# Patient Record
Sex: FEMALE | Race: WHITE | NOT HISPANIC OR LATINO | Employment: OTHER | URBAN - METROPOLITAN AREA
[De-identification: names, ages, dates, MRNs, and addresses within clinical notes are randomized per-mention and may not be internally consistent; named-entity substitution may affect disease eponyms.]

---

## 2021-01-11 ENCOUNTER — TRANSFERRED RECORDS (OUTPATIENT)
Dept: HEALTH INFORMATION MANAGEMENT | Facility: CLINIC | Age: 61
End: 2021-01-11
Payer: COMMERCIAL

## 2021-01-26 ENCOUNTER — TRANSFERRED RECORDS (OUTPATIENT)
Dept: HEALTH INFORMATION MANAGEMENT | Facility: CLINIC | Age: 61
End: 2021-01-26
Payer: COMMERCIAL

## 2021-06-01 ENCOUNTER — RECORDS - HEALTHEAST (OUTPATIENT)
Dept: ADMINISTRATIVE | Facility: CLINIC | Age: 61
End: 2021-06-01

## 2021-09-30 ENCOUNTER — TRANSFERRED RECORDS (OUTPATIENT)
Dept: HEALTH INFORMATION MANAGEMENT | Facility: CLINIC | Age: 61
End: 2021-09-30
Payer: COMMERCIAL

## 2021-09-30 LAB
ALT SERPL-CCNC: 34 LU/L (ref 0–32)
AST SERPL-CCNC: 68 LU/L (ref 0–40)
INR (EXTERNAL): 1.5 (ref 0.9–1.2)

## 2021-10-31 ENCOUNTER — HOSPITAL ENCOUNTER (EMERGENCY)
Facility: HOSPITAL | Age: 61
Discharge: HOME | End: 2021-11-01
Attending: EMERGENCY MEDICINE | Admitting: EMERGENCY MEDICINE
Payer: COMMERCIAL

## 2021-10-31 ENCOUNTER — APPOINTMENT (OUTPATIENT)
Dept: CT IMAGING | Facility: HOSPITAL | Age: 61
End: 2021-10-31
Attending: EMERGENCY MEDICINE
Payer: COMMERCIAL

## 2021-10-31 ENCOUNTER — APPOINTMENT (OUTPATIENT)
Dept: RADIOLOGY | Facility: HOSPITAL | Age: 61
End: 2021-10-31
Attending: EMERGENCY MEDICINE
Payer: COMMERCIAL

## 2021-10-31 VITALS
RESPIRATION RATE: 16 BRPM | SYSTOLIC BLOOD PRESSURE: 124 MMHG | OXYGEN SATURATION: 99 % | WEIGHT: 221 LBS | HEART RATE: 75 BPM | DIASTOLIC BLOOD PRESSURE: 58 MMHG

## 2021-10-31 DIAGNOSIS — S05.12XA PERIORBITAL CONTUSION OF LEFT EYE, INITIAL ENCOUNTER: ICD-10-CM

## 2021-10-31 DIAGNOSIS — S01.112A LEFT EYELID LACERATION, INITIAL ENCOUNTER: ICD-10-CM

## 2021-10-31 DIAGNOSIS — S09.90XA HEAD INJURY, INITIAL ENCOUNTER: ICD-10-CM

## 2021-10-31 LAB
ABO/RH(D): NORMAL
ALBUMIN SERPL-MCNC: 2.9 G/DL (ref 3.5–5)
ALP SERPL-CCNC: 125 U/L (ref 45–120)
ALT SERPL W P-5'-P-CCNC: 40 U/L (ref 0–45)
AMMONIA PLAS-SCNC: 9 UMOL/L (ref 11–35)
ANION GAP SERPL CALCULATED.3IONS-SCNC: 12 MMOL/L (ref 5–18)
ANTIBODY SCREEN, TUBE: NORMAL
APTT PPP: 42 SECONDS (ref 22–38)
AST SERPL W P-5'-P-CCNC: 63 U/L (ref 0–40)
BASOPHILS # BLD AUTO: 0.1 10E3/UL (ref 0–0.2)
BASOPHILS NFR BLD AUTO: 1 %
BILIRUB SERPL-MCNC: 4.5 MG/DL (ref 0–1)
BUN SERPL-MCNC: 7 MG/DL (ref 8–22)
CALCIUM SERPL-MCNC: 9.1 MG/DL (ref 8.5–10.5)
CHLORIDE BLD-SCNC: 106 MMOL/L (ref 98–107)
CO2 SERPL-SCNC: 21 MMOL/L (ref 22–31)
CREAT SERPL-MCNC: 0.7 MG/DL (ref 0.6–1.1)
EOSINOPHIL # BLD AUTO: 0.2 10E3/UL (ref 0–0.7)
EOSINOPHIL NFR BLD AUTO: 3 %
ERYTHROCYTE [DISTWIDTH] IN BLOOD BY AUTOMATED COUNT: 15 % (ref 10–15)
ETHANOL SERPL-MCNC: 221 MG/DL
GFR SERPL CREATININE-BSD FRML MDRD: >90 ML/MIN/1.73M2
GLUCOSE BLD-MCNC: 112 MG/DL (ref 70–125)
HCT VFR BLD AUTO: 38.4 % (ref 35–47)
HGB BLD-MCNC: 12.9 G/DL (ref 11.7–15.7)
IMM GRANULOCYTES # BLD: 0 10E3/UL
IMM GRANULOCYTES NFR BLD: 0 %
INR PPP: 1.74 (ref 0.85–1.15)
LYMPHOCYTES # BLD AUTO: 2 10E3/UL (ref 0.8–5.3)
LYMPHOCYTES NFR BLD AUTO: 31 %
MCH RBC QN AUTO: 34.6 PG (ref 26.5–33)
MCHC RBC AUTO-ENTMCNC: 33.6 G/DL (ref 31.5–36.5)
MCV RBC AUTO: 103 FL (ref 78–100)
MONOCYTES # BLD AUTO: 0.6 10E3/UL (ref 0–1.3)
MONOCYTES NFR BLD AUTO: 9 %
NEUTROPHILS # BLD AUTO: 3.7 10E3/UL (ref 1.6–8.3)
NEUTROPHILS NFR BLD AUTO: 56 %
NRBC # BLD AUTO: 0 10E3/UL
NRBC BLD AUTO-RTO: 0 /100
PLATELET # BLD AUTO: 140 10E3/UL (ref 150–450)
POTASSIUM BLD-SCNC: 3.5 MMOL/L (ref 3.5–5)
PROT SERPL-MCNC: 9.4 G/DL (ref 6–8)
RBC # BLD AUTO: 3.73 10E6/UL (ref 3.8–5.2)
SODIUM SERPL-SCNC: 139 MMOL/L (ref 136–145)
SPECIMEN EXPIRATION DATE: NORMAL
SPECIMEN EXPIRATION DATE: NORMAL
TROPONIN I SERPL-MCNC: 0.02 NG/ML (ref 0–0.29)
TROPONIN I SERPL-MCNC: 0.02 NG/ML (ref 0–0.29)
WBC # BLD AUTO: 6.5 10E3/UL (ref 4–11)

## 2021-10-31 PROCEDURE — 86850 RBC ANTIBODY SCREEN: CPT | Performed by: EMERGENCY MEDICINE

## 2021-10-31 PROCEDURE — 99285 EMERGENCY DEPT VISIT HI MDM: CPT | Mod: 25

## 2021-10-31 PROCEDURE — 85025 COMPLETE CBC W/AUTO DIFF WBC: CPT | Performed by: EMERGENCY MEDICINE

## 2021-10-31 PROCEDURE — 85610 PROTHROMBIN TIME: CPT | Performed by: EMERGENCY MEDICINE

## 2021-10-31 PROCEDURE — 36415 COLL VENOUS BLD VENIPUNCTURE: CPT | Performed by: EMERGENCY MEDICINE

## 2021-10-31 PROCEDURE — 84484 ASSAY OF TROPONIN QUANT: CPT | Performed by: EMERGENCY MEDICINE

## 2021-10-31 PROCEDURE — 70450 CT HEAD/BRAIN W/O DYE: CPT

## 2021-10-31 PROCEDURE — 82140 ASSAY OF AMMONIA: CPT | Performed by: EMERGENCY MEDICINE

## 2021-10-31 PROCEDURE — 86900 BLOOD TYPING SEROLOGIC ABO: CPT | Performed by: EMERGENCY MEDICINE

## 2021-10-31 PROCEDURE — 93005 ELECTROCARDIOGRAM TRACING: CPT | Performed by: EMERGENCY MEDICINE

## 2021-10-31 PROCEDURE — 70486 CT MAXILLOFACIAL W/O DYE: CPT

## 2021-10-31 PROCEDURE — 72125 CT NECK SPINE W/O DYE: CPT

## 2021-10-31 PROCEDURE — 82040 ASSAY OF SERUM ALBUMIN: CPT | Performed by: EMERGENCY MEDICINE

## 2021-10-31 PROCEDURE — 82077 ASSAY SPEC XCP UR&BREATH IA: CPT | Performed by: EMERGENCY MEDICINE

## 2021-10-31 PROCEDURE — 85730 THROMBOPLASTIN TIME PARTIAL: CPT | Performed by: EMERGENCY MEDICINE

## 2021-10-31 PROCEDURE — 73110 X-RAY EXAM OF WRIST: CPT | Mod: LT

## 2021-10-31 ASSESSMENT — ENCOUNTER SYMPTOMS
NECK PAIN: 0
NAUSEA: 1
ACTIVITY CHANGE: 1
MYALGIAS: 1
WOUND: 1

## 2021-11-01 LAB
ATRIAL RATE - MUSE: 77 BPM
DIASTOLIC BLOOD PRESSURE - MUSE: NORMAL MMHG
INTERPRETATION ECG - MUSE: NORMAL
P AXIS - MUSE: 50 DEGREES
PR INTERVAL - MUSE: 190 MS
QRS DURATION - MUSE: 88 MS
QT - MUSE: 442 MS
QTC - MUSE: 500 MS
R AXIS - MUSE: 20 DEGREES
SYSTOLIC BLOOD PRESSURE - MUSE: NORMAL MMHG
T AXIS - MUSE: 66 DEGREES
VENTRICULAR RATE- MUSE: 77 BPM

## 2021-11-01 PROCEDURE — 250N000013 HC RX MED GY IP 250 OP 250 PS 637: Performed by: EMERGENCY MEDICINE

## 2021-11-01 RX ORDER — ACETAMINOPHEN 325 MG/1
650 TABLET ORAL ONCE
Status: COMPLETED | OUTPATIENT
Start: 2021-11-01 | End: 2021-11-01

## 2021-11-01 RX ADMIN — ACETAMINOPHEN 650 MG: 325 TABLET ORAL at 00:25
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
## 2021-11-01 NOTE — DISCHARGE INSTRUCTIONS
No sleep monitoring needed tonight.  However, with her history of liver dysfunction, she is at risk for delayed bleeding so although we do not see any bleeding on her head tonight, please do make sure that if she starts to show signs of worsened head injury as we discussed and as are listed on the information sheet, please return to the emergency department right away for repeat evaluation.    Replace the Steri-Strips on her laceration as needed.  Continue to do frequent cool packs on the bruise around her left eye to help with swelling and pain.

## 2021-11-01 NOTE — ED NOTES
Bed: JNED-06  Expected date: 10/31/21  Expected time:   Means of arrival: Other  Comments:  Trauma Alert

## 2021-11-10 ENCOUNTER — TRANSFERRED RECORDS (OUTPATIENT)
Dept: HEALTH INFORMATION MANAGEMENT | Facility: CLINIC | Age: 61
End: 2021-11-10
Payer: COMMERCIAL

## 2021-11-10 LAB
ALT SERPL-CCNC: 30 LU/L (ref 0–32)
AST SERPL-CCNC: 53 LU/L (ref 0–40)
HEP C HIM: NORMAL

## 2022-03-15 ENCOUNTER — HOSPITAL ENCOUNTER (INPATIENT)
Facility: HOSPITAL | Age: 62
LOS: 13 days | Discharge: HOME HEALTH | DRG: 432 | End: 2022-03-28
Attending: FAMILY MEDICINE | Admitting: HOSPITALIST
Payer: COMMERCIAL

## 2022-03-15 ENCOUNTER — APPOINTMENT (OUTPATIENT)
Dept: ULTRASOUND IMAGING | Facility: HOSPITAL | Age: 62
DRG: 432 | End: 2022-03-15
Attending: FAMILY MEDICINE
Payer: COMMERCIAL

## 2022-03-15 ENCOUNTER — APPOINTMENT (OUTPATIENT)
Dept: CT IMAGING | Facility: HOSPITAL | Age: 62
DRG: 432 | End: 2022-03-15
Attending: EMERGENCY MEDICINE
Payer: COMMERCIAL

## 2022-03-15 DIAGNOSIS — K70.31 ALCOHOLIC CIRRHOSIS OF LIVER WITH ASCITES (H): ICD-10-CM

## 2022-03-15 DIAGNOSIS — M62.81 GENERALIZED MUSCLE WEAKNESS: ICD-10-CM

## 2022-03-15 DIAGNOSIS — G47.9 SLEEPING DIFFICULTY: Primary | ICD-10-CM

## 2022-03-15 PROBLEM — K64.8 PROLAPSED INTERNAL HEMORRHOIDS: Status: ACTIVE | Noted: 2019-10-29

## 2022-03-15 PROBLEM — Z90.2 HISTORY OF PNEUMONECTOMY: Status: ACTIVE | Noted: 2020-05-12

## 2022-03-15 PROBLEM — Z98.890 HISTORY OF PNEUMONECTOMY: Status: ACTIVE | Noted: 2020-05-12

## 2022-03-15 PROBLEM — R87.619 ABNORMAL CERVICAL PAPANICOLAOU SMEAR: Status: ACTIVE | Noted: 2020-05-12

## 2022-03-15 LAB
% LINING CELLS, BODY FLUID: 1 %
ALBUMIN FLD-MCNC: <0.6 G/DL
ALBUMIN SERPL-MCNC: 2.1 G/DL (ref 3.5–5)
ALBUMIN SERPL-MCNC: 2.3 G/DL (ref 3.5–5)
ALBUMIN UR-MCNC: 30 MG/DL
ALP SERPL-CCNC: 137 U/L (ref 45–120)
ALT SERPL W P-5'-P-CCNC: 31 U/L (ref 0–45)
ANION GAP SERPL CALCULATED.3IONS-SCNC: 8 MMOL/L (ref 5–18)
APPEARANCE FLD: ABNORMAL
APPEARANCE UR: ABNORMAL
AST SERPL W P-5'-P-CCNC: 44 U/L (ref 0–40)
BACTERIA #/AREA URNS HPF: ABNORMAL /HPF
BASOPHILS # BLD AUTO: 0.1 10E3/UL (ref 0–0.2)
BASOPHILS NFR BLD AUTO: 1 %
BILIRUB SERPL-MCNC: 6 MG/DL (ref 0–1)
BILIRUB UR QL STRIP: ABNORMAL
BUN SERPL-MCNC: 10 MG/DL (ref 8–22)
CALCIUM SERPL-MCNC: 7.7 MG/DL (ref 8.5–10.5)
CHLORIDE BLD-SCNC: 107 MMOL/L (ref 98–107)
CO2 SERPL-SCNC: 22 MMOL/L (ref 22–31)
COLOR FLD: YELLOW
COLOR UR AUTO: ABNORMAL
CREAT SERPL-MCNC: 0.68 MG/DL (ref 0.6–1.1)
EOSINOPHIL # BLD AUTO: 0.1 10E3/UL (ref 0–0.7)
EOSINOPHIL NFR BLD AUTO: 1 %
ERYTHROCYTE [DISTWIDTH] IN BLOOD BY AUTOMATED COUNT: 16.5 % (ref 10–15)
ETHANOL SERPL-MCNC: <10 MG/DL
GFR SERPL CREATININE-BSD FRML MDRD: >90 ML/MIN/1.73M2
GGT SERPL-CCNC: 23 U/L (ref 0–50)
GLUCOSE BLD-MCNC: 99 MG/DL (ref 70–125)
GLUCOSE UR STRIP-MCNC: 30 MG/DL
HCT VFR BLD AUTO: 30.6 % (ref 35–47)
HGB BLD-MCNC: 10.1 G/DL (ref 11.7–15.7)
HGB UR QL STRIP: ABNORMAL
IMM GRANULOCYTES # BLD: 0.1 10E3/UL
IMM GRANULOCYTES NFR BLD: 1 %
INR PPP: 2.47 (ref 0.85–1.15)
KETONES UR STRIP-MCNC: ABNORMAL MG/DL
LDH SERPL L TO P-CCNC: 304 U/L (ref 125–220)
LEUKOCYTE ESTERASE UR QL STRIP: NEGATIVE
LYMPHOCYTES # BLD AUTO: 1.4 10E3/UL (ref 0.8–5.3)
LYMPHOCYTES NFR BLD AUTO: 20 %
LYMPHOCYTES NFR FLD MANUAL: 8 %
MCH RBC QN AUTO: 33.4 PG (ref 26.5–33)
MCHC RBC AUTO-ENTMCNC: 33 G/DL (ref 31.5–36.5)
MCV RBC AUTO: 101 FL (ref 78–100)
MONOCYTES # BLD AUTO: 1.3 10E3/UL (ref 0–1.3)
MONOCYTES NFR BLD AUTO: 19 %
MONOS+MACROS NFR FLD MANUAL: 60 %
MUCOUS THREADS #/AREA URNS LPF: PRESENT /LPF
NEUTROPHILS # BLD AUTO: 3.9 10E3/UL (ref 1.6–8.3)
NEUTROPHILS NFR BLD AUTO: 58 %
NEUTS BAND NFR FLD MANUAL: 31 %
NITRATE UR QL: NEGATIVE
NRBC # BLD AUTO: 0 10E3/UL
NRBC BLD AUTO-RTO: 0 /100
PH UR STRIP: 6 [PH] (ref 5–7)
PLATELET # BLD AUTO: 122 10E3/UL (ref 150–450)
POTASSIUM BLD-SCNC: 3.3 MMOL/L (ref 3.5–5)
PROT FLD-MCNC: 1 G/DL
PROT SERPL-MCNC: 7.8 G/DL (ref 6–8)
RBC # BLD AUTO: 3.02 10E6/UL (ref 3.8–5.2)
RBC # FLD: 1000 /UL
RBC URINE: 3 /HPF
SARS-COV-2 RNA RESP QL NAA+PROBE: NEGATIVE
SODIUM SERPL-SCNC: 137 MMOL/L (ref 136–145)
SP GR UR STRIP: 1.03 (ref 1–1.03)
SQUAMOUS EPITHELIAL: 17 /HPF
UROBILINOGEN UR STRIP-MCNC: 8 MG/DL
WBC # BLD AUTO: 6.7 10E3/UL (ref 4–11)
WBC # FLD AUTO: 168 /UL
WBC URINE: 3 /HPF

## 2022-03-15 PROCEDURE — 87070 CULTURE OTHR SPECIMN AEROBIC: CPT | Performed by: FAMILY MEDICINE

## 2022-03-15 PROCEDURE — 82040 ASSAY OF SERUM ALBUMIN: CPT | Performed by: EMERGENCY MEDICINE

## 2022-03-15 PROCEDURE — 74177 CT ABD & PELVIS W/CONTRAST: CPT

## 2022-03-15 PROCEDURE — 83615 LACTATE (LD) (LDH) ENZYME: CPT | Performed by: EMERGENCY MEDICINE

## 2022-03-15 PROCEDURE — 80053 COMPREHEN METABOLIC PANEL: CPT | Performed by: EMERGENCY MEDICINE

## 2022-03-15 PROCEDURE — 99223 1ST HOSP IP/OBS HIGH 75: CPT | Performed by: HOSPITALIST

## 2022-03-15 PROCEDURE — 36415 COLL VENOUS BLD VENIPUNCTURE: CPT | Performed by: EMERGENCY MEDICINE

## 2022-03-15 PROCEDURE — 84157 ASSAY OF PROTEIN OTHER: CPT | Performed by: FAMILY MEDICINE

## 2022-03-15 PROCEDURE — 88305 TISSUE EXAM BY PATHOLOGIST: CPT | Mod: TC | Performed by: HOSPITALIST

## 2022-03-15 PROCEDURE — 82042 OTHER SOURCE ALBUMIN QUAN EA: CPT | Performed by: FAMILY MEDICINE

## 2022-03-15 PROCEDURE — 0W9G3ZZ DRAINAGE OF PERITONEAL CAVITY, PERCUTANEOUS APPROACH: ICD-10-PCS | Performed by: RADIOLOGY

## 2022-03-15 PROCEDURE — 82077 ASSAY SPEC XCP UR&BREATH IA: CPT | Performed by: EMERGENCY MEDICINE

## 2022-03-15 PROCEDURE — 49083 ABD PARACENTESIS W/IMAGING: CPT

## 2022-03-15 PROCEDURE — 82977 ASSAY OF GGT: CPT | Performed by: EMERGENCY MEDICINE

## 2022-03-15 PROCEDURE — 87015 SPECIMEN INFECT AGNT CONCNTJ: CPT | Performed by: FAMILY MEDICINE

## 2022-03-15 PROCEDURE — 210N000001 HC R&B IMCU HEART CARE

## 2022-03-15 PROCEDURE — C9803 HOPD COVID-19 SPEC COLLECT: HCPCS

## 2022-03-15 PROCEDURE — 99285 EMERGENCY DEPT VISIT HI MDM: CPT | Mod: 25

## 2022-03-15 PROCEDURE — 250N000011 HC RX IP 250 OP 636: Performed by: FAMILY MEDICINE

## 2022-03-15 PROCEDURE — 87635 SARS-COV-2 COVID-19 AMP PRB: CPT | Performed by: FAMILY MEDICINE

## 2022-03-15 PROCEDURE — 85610 PROTHROMBIN TIME: CPT | Performed by: EMERGENCY MEDICINE

## 2022-03-15 PROCEDURE — 81003 URINALYSIS AUTO W/O SCOPE: CPT | Performed by: EMERGENCY MEDICINE

## 2022-03-15 PROCEDURE — 85025 COMPLETE CBC W/AUTO DIFF WBC: CPT | Performed by: EMERGENCY MEDICINE

## 2022-03-15 PROCEDURE — 272N000706 US PARACENTESIS

## 2022-03-15 PROCEDURE — 89051 BODY FLUID CELL COUNT: CPT | Performed by: FAMILY MEDICINE

## 2022-03-15 PROCEDURE — 87205 SMEAR GRAM STAIN: CPT | Performed by: FAMILY MEDICINE

## 2022-03-15 PROCEDURE — 82105 ALPHA-FETOPROTEIN SERUM: CPT | Performed by: HOSPITALIST

## 2022-03-15 PROCEDURE — 83735 ASSAY OF MAGNESIUM: CPT | Performed by: HOSPITALIST

## 2022-03-15 RX ORDER — IOPAMIDOL 755 MG/ML
100 INJECTION, SOLUTION INTRAVASCULAR ONCE
Status: COMPLETED | OUTPATIENT
Start: 2022-03-15 | End: 2022-03-15

## 2022-03-15 RX ADMIN — IOPAMIDOL 100 ML: 755 INJECTION, SOLUTION INTRAVENOUS at 18:28

## 2022-03-15 ASSESSMENT — ACTIVITIES OF DAILY LIVING (ADL)
ADLS_ACUITY_SCORE: 12

## 2022-03-15 ASSESSMENT — ENCOUNTER SYMPTOMS
FEVER: 0
FATIGUE: 1
DIFFICULTY URINATING: 0
ABDOMINAL PAIN: 1
UNEXPECTED WEIGHT CHANGE: 1
CHILLS: 1
DYSURIA: 0
SHORTNESS OF BREATH: 1

## 2022-03-15 NOTE — ED PROVIDER NOTES
ED triage provider note    Seen in clinic yesterday for symptoms of swelling and jaundice.  Labs revealed evidence of liver failure.  She has a history of hep C.  Denies recent alcohol intake.    Vital signs are normal.  Appears jaundiced    Diagnostics ordered     Rasheed Bailon MD  03/15/22 1330       Rasheed Bailon MD  03/15/22 1330

## 2022-03-15 NOTE — ED TRIAGE NOTES
"Pt with c/o leg and abdominal pain along with bilateral swelling  And weight gain.  Pt saw her primary doctor yesterday and was told they would call her with results if they were bad o/w she could look in \"my chart\"  Pt states provider sent the  out to her place to do a wellness check on her.  Pt is jaundiced.  "

## 2022-03-15 NOTE — PHARMACY-ADMISSION MEDICATION HISTORY
Pharmacy Note - Admission Medication History    Pertinent Provider Information:      ______________________________________________________________________    Prior To Admission (PTA) med list completed and updated in EMR.       PTA Med List   Medication Sig Last Dose     sertraline (ZOLOFT) 50 MG tablet Take 50 mg by mouth daily 3/15/2022 at Unknown time       Information source(s): Patient and CareEverywhere/SureScripts  Method of interview communication: in-person    Summary of Changes to PTA Med List  New: sertraline  Discontinued: n/a  Changed: n/a    Patient was asked about OTC/herbal products specifically.  PTA med list reflects this.      Allergies were reviewed, assessed, and updated with the patient.      Patient does not use any multi-dose medications prior to admission.    The information provided in this note is only as accurate as the sources available at the time of the update(s).    Thank you for the opportunity to participate in the care of this patient.    Gia Jones [facility]  3/15/2022 5:28 PM

## 2022-03-15 NOTE — ED PROVIDER NOTES
EMERGENCY DEPARTMENT ENCOUNTER      NAME: Marj Slater  AGE: 61 year old female  YOB: 1960  MRN: [id number]  EVALUATION DATE & TIME: 3/15/2022  4:10 PM    PCP: [facility]    ED PROVIDER: Rasheed Mcelroy M.D.    Chief Complaint   Patient presents with     liver failure       FINAL IMPRESSION:  1. Alcoholic cirrhosis of liver with ascites (H)        ED COURSE & MEDICAL DECISION MAKING:    Pertinent Labs & Imaging studies personally reviewed and interpreted by me. (See chart for details)     4:34 PM Patient seen and examined, prior records reviewed.  I met the patient and performed my initial interview and exam.  Patient with known history of liver failure, comes in with worsening jaundice, abdominal distention, lower extremity swelling.  Denies alcohol use and Tylenol use.  History of hepatitis C but this has been treated twice by her report.  On exam, appears jaundiced with pitting edema to the thighs and abdominal distention, minimal tenderness.  Labs and CT scan are ordered.  Labs from triage so far demonstrate elevated INR at 2.47.  Patient is not anticoagulated.  7:16 PM CT scan demonstrates ascites and some small esophageal varices, patient has no hematemesis and hemoglobin is stable.  Care was discussed with GI, Dr. Estes who recommends diagnostic paracentesis, patient will be admitted.  7:25 PM I rechecked and updated the patient.    8:32 PM  Waiting for callback from hospitalist.  8:38 PM I spoke with Dr. Milner, hospitalist.     At the conclusion of the encounter I discussed the results of all of the tests and the disposition. The questions were answered. The patient or family acknowledged understanding and was agreeable with the care plan.     PROCEDURES:   Procedures    MEDICATIONS GIVEN IN THE EMERGENCY:  Medications   iopamidol (ISOVUE-370) solution 100 mL (100 mLs Intravenous Given 3/15/22 1828)       NEW PRESCRIPTIONS STARTED AT TODAY'S ER VISIT  New  Prescriptions    No medications on file       =================================================================    HPI    Patient information was obtained from: Patient      Marj Slater is a 61 year old female with a pertinent history of hepatitis C who presents to this ED by car for evaluation of liver failure.     Patient reports that she went to her doctor and was told to come here for her liver. She has had leg and feet swelling for a couple weeks, and has abdominal pain and fatigue for a week as well. Notes having shortness of breath with exertion. Has gained 20 pounds recently. History of hepatitis C that has been treated twice. Ten years ago was treated with pegasys and last year treated with Mayvret. No history of swelling like this. She still is able to pee. No dysuria. Patient takes sertraline. No alcohol since October but used to drink regularly. Reports having chills a week ago but no fever. No recent travel. Has an endoscopy on the 30th with [facility]. Denies any chest pain, or any other complaints at this time.       REVIEW OF SYSTEMS   Review of Systems   Constitutional: Positive for chills, fatigue and unexpected weight change. Negative for fever.   Respiratory: Positive for shortness of breath.    Cardiovascular: Positive for leg swelling. Negative for chest pain.   Gastrointestinal: Positive for abdominal pain.   Genitourinary: Negative for difficulty urinating and dysuria.      All other systems reviewed and negative    PAST MEDICAL HISTORY:  Past Medical History:   Diagnosis Date     Chronic hepatitis C (H)      History of pneumonectomy        PAST SURGICAL HISTORY:  History reviewed. No pertinent surgical history.    CURRENT MEDICATIONS:    No current facility-administered medications for this encounter.     Current Outpatient Medications   Medication     sertraline (ZOLOFT) 50 MG tablet       ALLERGIES:  No Known Allergies    FAMILY HISTORY:  History reviewed. No pertinent family  "history.    SOCIAL HISTORY:   Social History     Socioeconomic History     Marital status: Single     Spouse name: Not on file     Number of children: Not on file     Years of education: Not on file     Highest education level: Not on file   Occupational History     Not on file   Tobacco Use     Smoking status: Not on file     Smokeless tobacco: Not on file   Substance and Sexual Activity     Alcohol use: Not on file     Drug use: Not on file     Sexual activity: Not on file   Other Topics Concern     Not on file   Social History Narrative     Not on file     Social Determinants of Health     Financial Resource Strain: Not on file   Food Insecurity: Not on file   Transportation Needs: Not on file   Physical Activity: Not on file   Stress: Not on file   Social Connections: Not on file   Intimate Partner Violence: Not on file   Housing Stability: Not on file       VITALS:  BP (!) 162/73   Pulse 76   Temp 98  F (36.7  C)   Resp 16   Ht 1.651 m (5' 5\")   Wt 109.3 kg (241 lb)   SpO2 97%   BMI 40.10 kg/m      PHYSICAL EXAM:  Physical Exam  Vitals and nursing note reviewed.   Constitutional:       Appearance: Normal appearance.   HENT:      Head: Normocephalic and atraumatic.      Right Ear: External ear normal.      Left Ear: External ear normal.      Nose: Nose normal.      Mouth/Throat:      Mouth: Mucous membranes are moist.   Eyes:      Extraocular Movements: Extraocular movements intact.      Conjunctiva/sclera: Conjunctivae normal.      Pupils: Pupils are equal, round, and reactive to light.   Cardiovascular:      Rate and Rhythm: Normal rate and regular rhythm.      Comments: Moderate pitting edema in thighs.   Pulmonary:      Effort: Pulmonary effort is normal.      Breath sounds: Normal breath sounds. No wheezing or rales.   Abdominal:      General: Abdomen is flat. There is distension.      Palpations: Abdomen is soft.      Tenderness: There is abdominal tenderness (mild diffuse). There is no guarding. "   Musculoskeletal:         General: Normal range of motion.      Cervical back: Normal range of motion and neck supple.      Right lower leg: No edema.      Left lower leg: No edema.   Lymphadenopathy:      Cervical: No cervical adenopathy.   Skin:     General: Skin is warm and dry.      Coloration: Skin is jaundiced.   Neurological:      General: No focal deficit present.      Mental Status: She is alert and oriented to person, place, and time. Mental status is at baseline.      Comments: No gross focal neurologic deficits   Psychiatric:         Mood and Affect: Mood normal.         Behavior: Behavior normal.         Thought Content: Thought content normal.          LAB:  All pertinent labs reviewed and interpreted.  Results for orders placed or performed during the hospital encounter of 03/15/22   CT Abdomen Pelvis w Contrast    Impression    IMPRESSION:   1.  Hepatic cirrhosis and steatosis portal hypertension including large volume ascites, mild splenic enlargement and small caliber upper abdominal varices.  2.  Cholelithiasis.   Result Value Ref Range    INR 2.47 (H) 0.85 - 1.15   Comprehensive metabolic panel   Result Value Ref Range    Sodium 137 136 - 145 mmol/L    Potassium 3.3 (L) 3.5 - 5.0 mmol/L    Chloride 107 98 - 107 mmol/L    Carbon Dioxide (CO2) 22 22 - 31 mmol/L    Anion Gap 8 5 - 18 mmol/L    Urea Nitrogen 10 8 - 22 mg/dL    Creatinine 0.68 0.60 - 1.10 mg/dL    Calcium 7.7 (L) 8.5 - 10.5 mg/dL    Glucose 99 70 - 125 mg/dL    Alkaline Phosphatase 137 (H) 45 - 120 U/L    AST 44 (H) 0 - 40 U/L    ALT 31 0 - 45 U/L    Protein Total 7.8 6.0 - 8.0 g/dL    Albumin 2.3 (L) 3.5 - 5.0 g/dL    Bilirubin Total 6.0 (H) 0.0 - 1.0 mg/dL    GFR Estimate >90 >60 mL/min/1.73m2   UA with Microscopic reflex to Culture    Specimen: Urine, Midstream   Result Value Ref Range    Color Urine Stefania (A) Colorless, Straw, Light Yellow, Yellow    Appearance Urine Turbid (A) Clear    Glucose Urine 30  (A) Negative mg/dL     Bilirubin Urine 1.0 mg/dL (A) Negative    Ketones Urine Trace (A) Negative mg/dL    Specific Gravity Urine 1.030 1.001 - 1.030    Blood Urine 0.2 mg/dL (A) Negative    pH Urine 6.0 5.0 - 7.0    Protein Albumin Urine 30  (A) Negative mg/dL    Urobilinogen Urine 8.0 (A) <2.0 mg/dL    Nitrite Urine Negative Negative    Leukocyte Esterase Urine Negative Negative    Bacteria Urine Few (A) None Seen /HPF    Mucus Urine Present (A) None Seen /LPF    RBC Urine 3 (H) <=2 /HPF    WBC Urine 3 <=5 /HPF    Squamous Epithelials Urine 17 (H) <=1 /HPF   Ethyl Alcohol Level   Result Value Ref Range    Alcohol, Blood <10 None detected mg/dL   Result Value Ref Range    Lactate Dehydrogenase 304 (H) 125 - 220 U/L   CBC with platelets and differential   Result Value Ref Range    WBC Count 6.7 4.0 - 11.0 10e3/uL    RBC Count 3.02 (L) 3.80 - 5.20 10e6/uL    Hemoglobin 10.1 (L) 11.7 - 15.7 g/dL    Hematocrit 30.6 (L) 35.0 - 47.0 %     (H) 78 - 100 fL    MCH 33.4 (H) 26.5 - 33.0 pg    MCHC 33.0 31.5 - 36.5 g/dL    RDW 16.5 (H) 10.0 - 15.0 %    Platelet Count 122 (L) 150 - 450 10e3/uL    % Neutrophils 58 %    % Lymphocytes 20 %    % Monocytes 19 %    % Eosinophils 1 %    % Basophils 1 %    % Immature Granulocytes 1 %    NRBCs per 100 WBC 0 <1 /100    Absolute Neutrophils 3.9 1.6 - 8.3 10e3/uL    Absolute Lymphocytes 1.4 0.8 - 5.3 10e3/uL    Absolute Monocytes 1.3 0.0 - 1.3 10e3/uL    Absolute Eosinophils 0.1 0.0 - 0.7 10e3/uL    Absolute Basophils 0.1 0.0 - 0.2 10e3/uL    Absolute Immature Granulocytes 0.1 <=0.4 10e3/uL    Absolute NRBCs 0.0 10e3/uL   Albumin level   Result Value Ref Range    Albumin 2.1 (L) 3.5 - 5.0 g/dL       RADIOLOGY:  Reviewed all pertinent imaging. Please see official radiology report.  CT Abdomen Pelvis w Contrast   Final Result   IMPRESSION:    1.  Hepatic cirrhosis and steatosis portal hypertension including large volume ascites, mild splenic enlargement and small caliber upper abdominal varices.   2.   Cholelithiasis.      US Paracentesis    (Results Pending)       I, Sabino Owens, am serving as a scribe to document services personally performed by Dr. Mcelroy based on my observation and the provider's statements to me. I, Rasheed Mcelroy MD attest that Sabino Owens is acting in a scribe capacity, has observed my performance of the services and has documented them in accordance with my direction.    Rasheed Mcelroy M.D.  Emergency Medicine  [facility] EMERGENCY DEPARTMENT  [address] [place] [id number]  [phone]  Dept: [phone]     Rasheed Mcelroy MD  03/31/22 0804

## 2022-03-16 PROBLEM — R60.1 ANASARCA: Status: ACTIVE | Noted: 2022-03-16

## 2022-03-16 LAB
AFP SERPL-MCNC: 4.5 NG/ML
ALBUMIN SERPL-MCNC: 1.9 G/DL (ref 3.5–5)
ALP SERPL-CCNC: 123 U/L (ref 45–120)
ALT SERPL W P-5'-P-CCNC: 21 U/L (ref 0–45)
AMMONIA PLAS-SCNC: 44 UMOL/L (ref 11–35)
ANION GAP SERPL CALCULATED.3IONS-SCNC: 6 MMOL/L (ref 5–18)
AST SERPL W P-5'-P-CCNC: 38 U/L (ref 0–40)
BASOPHILS # BLD AUTO: 0 10E3/UL (ref 0–0.2)
BASOPHILS NFR BLD AUTO: 1 %
BILIRUB SERPL-MCNC: 5 MG/DL (ref 0–1)
BUN SERPL-MCNC: 9 MG/DL (ref 8–22)
CALCIUM SERPL-MCNC: 7.5 MG/DL (ref 8.5–10.5)
CHLORIDE BLD-SCNC: 106 MMOL/L (ref 98–107)
CO2 SERPL-SCNC: 24 MMOL/L (ref 22–31)
CREAT SERPL-MCNC: 0.66 MG/DL (ref 0.6–1.1)
EOSINOPHIL # BLD AUTO: 0.1 10E3/UL (ref 0–0.7)
EOSINOPHIL NFR BLD AUTO: 2 %
ERYTHROCYTE [DISTWIDTH] IN BLOOD BY AUTOMATED COUNT: 16.5 % (ref 10–15)
GFR SERPL CREATININE-BSD FRML MDRD: >90 ML/MIN/1.73M2
GLUCOSE BLD-MCNC: 113 MG/DL (ref 70–125)
GRAM STAIN RESULT: NORMAL
GRAM STAIN RESULT: NORMAL
HCT VFR BLD AUTO: 26 % (ref 35–47)
HGB BLD-MCNC: 8.8 G/DL (ref 11.7–15.7)
IMM GRANULOCYTES # BLD: 0 10E3/UL
IMM GRANULOCYTES NFR BLD: 1 %
INR PPP: 2.66 (ref 0.85–1.15)
LYMPHOCYTES # BLD AUTO: 1.5 10E3/UL (ref 0.8–5.3)
LYMPHOCYTES NFR BLD AUTO: 23 %
MAGNESIUM SERPL-MCNC: 1.4 MG/DL (ref 1.8–2.6)
MAGNESIUM SERPL-MCNC: 1.7 MG/DL (ref 1.8–2.6)
MCH RBC QN AUTO: 34.1 PG (ref 26.5–33)
MCHC RBC AUTO-ENTMCNC: 33.8 G/DL (ref 31.5–36.5)
MCV RBC AUTO: 101 FL (ref 78–100)
MONOCYTES # BLD AUTO: 1.1 10E3/UL (ref 0–1.3)
MONOCYTES NFR BLD AUTO: 17 %
NEUTROPHILS # BLD AUTO: 3.6 10E3/UL (ref 1.6–8.3)
NEUTROPHILS NFR BLD AUTO: 56 %
NRBC # BLD AUTO: 0 10E3/UL
NRBC BLD AUTO-RTO: 0 /100
PLATELET # BLD AUTO: 92 10E3/UL (ref 150–450)
POTASSIUM BLD-SCNC: 3.3 MMOL/L (ref 3.5–5)
POTASSIUM BLD-SCNC: 3.3 MMOL/L (ref 3.5–5)
POTASSIUM BLD-SCNC: 3.8 MMOL/L (ref 3.5–5)
PROT SERPL-MCNC: 6.6 G/DL (ref 6–8)
RBC # BLD AUTO: 2.58 10E6/UL (ref 3.8–5.2)
SODIUM SERPL-SCNC: 136 MMOL/L (ref 136–145)
WBC # BLD AUTO: 6.3 10E3/UL (ref 4–11)

## 2022-03-16 PROCEDURE — 82140 ASSAY OF AMMONIA: CPT | Performed by: HOSPITALIST

## 2022-03-16 PROCEDURE — 36415 COLL VENOUS BLD VENIPUNCTURE: CPT | Performed by: INTERNAL MEDICINE

## 2022-03-16 PROCEDURE — 84132 ASSAY OF SERUM POTASSIUM: CPT | Performed by: INTERNAL MEDICINE

## 2022-03-16 PROCEDURE — 250N000011 HC RX IP 250 OP 636: Performed by: HOSPITALIST

## 2022-03-16 PROCEDURE — 120N000004 HC R&B MS OVERFLOW

## 2022-03-16 PROCEDURE — 250N000013 HC RX MED GY IP 250 OP 250 PS 637: Performed by: HOSPITALIST

## 2022-03-16 PROCEDURE — 99233 SBSQ HOSP IP/OBS HIGH 50: CPT | Performed by: INTERNAL MEDICINE

## 2022-03-16 PROCEDURE — 85610 PROTHROMBIN TIME: CPT | Performed by: HOSPITALIST

## 2022-03-16 PROCEDURE — 85014 HEMATOCRIT: CPT | Performed by: HOSPITALIST

## 2022-03-16 PROCEDURE — 80053 COMPREHEN METABOLIC PANEL: CPT | Performed by: HOSPITALIST

## 2022-03-16 PROCEDURE — 36415 COLL VENOUS BLD VENIPUNCTURE: CPT | Performed by: HOSPITALIST

## 2022-03-16 PROCEDURE — 250N000011 HC RX IP 250 OP 636: Performed by: INTERNAL MEDICINE

## 2022-03-16 PROCEDURE — 250N000013 HC RX MED GY IP 250 OP 250 PS 637: Performed by: INTERNAL MEDICINE

## 2022-03-16 PROCEDURE — 83735 ASSAY OF MAGNESIUM: CPT | Performed by: INTERNAL MEDICINE

## 2022-03-16 RX ORDER — POTASSIUM CHLORIDE 1500 MG/1
40 TABLET, EXTENDED RELEASE ORAL ONCE
Status: COMPLETED | OUTPATIENT
Start: 2022-03-16 | End: 2022-03-16

## 2022-03-16 RX ORDER — NALOXONE HYDROCHLORIDE 0.4 MG/ML
0.4 INJECTION, SOLUTION INTRAMUSCULAR; INTRAVENOUS; SUBCUTANEOUS
Status: DISCONTINUED | OUTPATIENT
Start: 2022-03-16 | End: 2022-03-28 | Stop reason: HOSPADM

## 2022-03-16 RX ORDER — SPIRONOLACTONE 25 MG/1
100 TABLET ORAL DAILY
Status: DISCONTINUED | OUTPATIENT
Start: 2022-03-16 | End: 2022-03-28 | Stop reason: HOSPADM

## 2022-03-16 RX ORDER — NALOXONE HYDROCHLORIDE 0.4 MG/ML
0.2 INJECTION, SOLUTION INTRAMUSCULAR; INTRAVENOUS; SUBCUTANEOUS
Status: DISCONTINUED | OUTPATIENT
Start: 2022-03-16 | End: 2022-03-28 | Stop reason: HOSPADM

## 2022-03-16 RX ORDER — LANOLIN ALCOHOL/MO/W.PET/CERES
3 CREAM (GRAM) TOPICAL
Status: DISCONTINUED | OUTPATIENT
Start: 2022-03-16 | End: 2022-03-25

## 2022-03-16 RX ORDER — PROCHLORPERAZINE 25 MG
25 SUPPOSITORY, RECTAL RECTAL EVERY 12 HOURS PRN
Status: DISCONTINUED | OUTPATIENT
Start: 2022-03-16 | End: 2022-03-28 | Stop reason: HOSPADM

## 2022-03-16 RX ORDER — POTASSIUM CHLORIDE 1500 MG/1
20 TABLET, EXTENDED RELEASE ORAL ONCE
Status: COMPLETED | OUTPATIENT
Start: 2022-03-16 | End: 2022-03-16

## 2022-03-16 RX ORDER — FUROSEMIDE 10 MG/ML
40 INJECTION INTRAMUSCULAR; INTRAVENOUS EVERY 12 HOURS
Status: DISCONTINUED | OUTPATIENT
Start: 2022-03-16 | End: 2022-03-17

## 2022-03-16 RX ORDER — CEFTRIAXONE 2 G/1
2 INJECTION, POWDER, FOR SOLUTION INTRAMUSCULAR; INTRAVENOUS EVERY 24 HOURS
Status: DISCONTINUED | OUTPATIENT
Start: 2022-03-16 | End: 2022-03-16

## 2022-03-16 RX ORDER — LIDOCAINE 40 MG/G
CREAM TOPICAL
Status: DISCONTINUED | OUTPATIENT
Start: 2022-03-16 | End: 2022-03-28 | Stop reason: HOSPADM

## 2022-03-16 RX ORDER — OXYCODONE HYDROCHLORIDE 5 MG/1
5 TABLET ORAL EVERY 4 HOURS PRN
Status: DISCONTINUED | OUTPATIENT
Start: 2022-03-16 | End: 2022-03-22

## 2022-03-16 RX ORDER — PROCHLORPERAZINE MALEATE 10 MG
10 TABLET ORAL EVERY 6 HOURS PRN
Status: DISCONTINUED | OUTPATIENT
Start: 2022-03-16 | End: 2022-03-28 | Stop reason: HOSPADM

## 2022-03-16 RX ORDER — MAGNESIUM SULFATE 4 G/50ML
4 INJECTION INTRAVENOUS ONCE
Status: COMPLETED | OUTPATIENT
Start: 2022-03-16 | End: 2022-03-16

## 2022-03-16 RX ADMIN — MAGNESIUM SULFATE HEPTAHYDRATE 4 G: 80 INJECTION, SOLUTION INTRAVENOUS at 10:05

## 2022-03-16 RX ADMIN — OXYCODONE HYDROCHLORIDE 5 MG: 5 TABLET ORAL at 19:36

## 2022-03-16 RX ADMIN — FUROSEMIDE 40 MG: 10 INJECTION, SOLUTION INTRAMUSCULAR; INTRAVENOUS at 01:12

## 2022-03-16 RX ADMIN — POTASSIUM CHLORIDE 20 MEQ: 1500 TABLET, EXTENDED RELEASE ORAL at 16:56

## 2022-03-16 RX ADMIN — FUROSEMIDE 40 MG: 10 INJECTION, SOLUTION INTRAMUSCULAR; INTRAVENOUS at 13:24

## 2022-03-16 RX ADMIN — SPIRONOLACTONE 100 MG: 25 TABLET, FILM COATED ORAL at 09:47

## 2022-03-16 RX ADMIN — OXYCODONE HYDROCHLORIDE 5 MG: 5 TABLET ORAL at 09:48

## 2022-03-16 RX ADMIN — CEFTRIAXONE SODIUM 2 G: 2 INJECTION, POWDER, FOR SOLUTION INTRAMUSCULAR; INTRAVENOUS at 01:13

## 2022-03-16 RX ADMIN — OXYCODONE HYDROCHLORIDE 5 MG: 5 TABLET ORAL at 01:19

## 2022-03-16 RX ADMIN — SERTRALINE HYDROCHLORIDE 50 MG: 50 TABLET ORAL at 09:48

## 2022-03-16 RX ADMIN — POTASSIUM CHLORIDE 40 MEQ: 1500 TABLET, EXTENDED RELEASE ORAL at 09:48

## 2022-03-16 ASSESSMENT — ACTIVITIES OF DAILY LIVING (ADL)
ADLS_ACUITY_SCORE: 8
ADLS_ACUITY_SCORE: 8
TOILETING_ISSUES: NO
ADLS_ACUITY_SCORE: 8
WALKING_OR_CLIMBING_STAIRS: AMBULATION DIFFICULTY, ASSISTANCE 1 PERSON
ADLS_ACUITY_SCORE: 8
DEPENDENT_IADLS:: INDEPENDENT
ADLS_ACUITY_SCORE: 8
ADLS_ACUITY_SCORE: 8
WALKING_OR_CLIMBING_STAIRS_DIFFICULTY: YES
ADLS_ACUITY_SCORE: 8
DOING_ERRANDS_INDEPENDENTLY_DIFFICULTY: NO
ADLS_ACUITY_SCORE: 8
DRESSING/BATHING_DIFFICULTY: NO
ADLS_ACUITY_SCORE: 8
FALL_HISTORY_WITHIN_LAST_SIX_MONTHS: YES
WEAR_GLASSES_OR_BLIND: YES
ADLS_ACUITY_SCORE: 8
CONCENTRATING,_REMEMBERING_OR_MAKING_DECISIONS_DIFFICULTY: YES
DIFFICULTY_EATING/SWALLOWING: NO
ADLS_ACUITY_SCORE: 8
ADLS_ACUITY_SCORE: 12
ADLS_ACUITY_SCORE: 8

## 2022-03-16 NOTE — CONSULTS
Care Management Initial Consult    General Information  Assessment completed with: Patient, patient  Type of CM/SW Visit: Initial Assessment    Primary Care Provider verified and updated as needed: Yes   Readmission within the last 30 days:           Advance Care Planning:            Communication Assessment  Patient's communication style: spoken language (English or Bilingual)    Hearing Difficulty or Deaf: no   Wear Glasses or Blind: yes    Cognitive  Cognitive/Neuro/Behavioral: level of consciousness, orientation  Level of Consciousness: alert     Orientation: oriented x 4             Living Environment:   People in home: sibling(s)     Current living Arrangements: house      Able to return to prior arrangements: yes       Family/Social Support:  Care provided by: self  Provides care for: no one  Marital Status: Single  Sibling(s)          Description of Support System: Supportive    Support Assessment: Adequate family and caregiver support    Current Resources:   Patient receiving home care services: No     Community Resources: OP Mental Health  Equipment currently used at home: none  Supplies currently used at home: None    Employment/Financial:  Employment Status: unemployed        Financial Concerns:     Referral to Financial Worker: No       Lifestyle & Psychosocial Needs:  Social Determinants of Health     Tobacco Use: High Risk     Smoking Tobacco Use: Current Every Day Smoker     Smokeless Tobacco Use: Unknown   Alcohol Use: Not on file   Financial Resource Strain: Not on file   Food Insecurity: Not on file   Transportation Needs: Not on file   Physical Activity: Not on file   Stress: Not on file   Social Connections: Not on file   Intimate Partner Violence: Not on file   Depression: Not on file   Housing Stability: Not on file       Functional Status:  Prior to admission patient needed assistance:   Dependent ADLs:: Independent  Dependent IADLs:: Independent       Mental Health Status:  Mental Health  Status: Current Concern  Mental Health Management: Medication, Individual Therapy, Psychiatrist    Chemical Dependency Status:  Chemical Dependency Status: Past Concern  Chemical Dependency Management: AA          Values/Beliefs:  Spiritual, Cultural Beliefs, Confucianism Practices, Values that affect care: no               Additional Information:  Chart reviewed. SW met with patient in her room to introduce self CM role and complete initial assessment. Pt lives with her sister, she is independent at baseline, drives. Pt is not working, asking about applying for disability if her health continues to decline. Patient reports she sees a therapist and psychiatrist with [facility] which has been helpful. She reports she also attends AA meetings. At this time, denies needs. Care management will follow for needs.    Bonnie Mann, LELOSW

## 2022-03-16 NOTE — CONSULTS
"[facility] Digestive Health Consultation     Marj Slater  [address]  [place] [address]  61 year old female     Admission Date/Time: 3/15/2022  Primary Care Provider: Rossana Bahena  Referring / Attending Physician:  Sky     We were asked to see the patient in consultation by Dr. Milner for evaluation of \"anasarca, cirrhosis.\"       CC: abdominal pain, abdominal distension     HPI:  Marj Slater is a 61 year old female with PMH cirrhosis due to alcohol and hepatitis C (treated with Mavyret in 2021), sober since October 31, 2021, who presented to ER per recommendation of her PCP for abdominal distension/pain and LE edema. She was seen at her PCP 3/14 who was concerned about her appearing jaundiced (per my review of chart note) and her liver/pancreas function. The patient reports she didn't turn her phone back on after the visit. When she didn't answer her PCPs calls the authorities were sent to her home for a welfare check and she was told to go to ER. Currently she reports continued abdominal pain and nausea, though improved since paracentesis yesterday. Her LE edema remains and is unchanged since yesterday. She tolerated a regular diet this AM without worsening pain. She has not had BM since 3/14 but that was normal without melena/hematochezia. She has not had any confusion.     She had an EGD at [facility] 1/2021 that showed mild portal hypertensive gastropathy but no varices and was otherwise unremarkable. She was last seen at [facility] 11/10/21 and labs at that time confirmed successful hepatitis C treatment from earlier in the year.      ROS: A comprehensive ten point review of systems was negative aside from those in mentioned in the HPI.      PAST MEDICAL HISTORY:  Patient Active Problem List    Diagnosis Date Noted     Anasarca 03/16/2022     Priority: Medium     Alcoholic cirrhosis of liver with ascites (H) 03/15/2022     Priority: Medium     History of pneumonectomy 05/12/2020     " "Priority: Medium     Abnormal cervical Papanicolaou smear 05/12/2020     Priority: Medium     Prolapsed internal hemorrhoids 10/29/2019     Priority: Medium     Formatting of this note might be different from the original.  Added automatically from request for surgery [id number]       Chronic hepatitis C virus infection (H) 02/21/2008     Priority: Medium     SOCIAL HISTORY:  Social History     Tobacco Use     Smoking status: Current Every Day Smoker     Packs/day: 0.50     Smokeless tobacco: None   Substance Use Topics     Alcohol use: None     Drug use: None   Current tobacco, no etoh since 10/31/21.    FAMILY HISTORY:  History reviewed. No pertinent family history.   No pertinent family history.     ALLERGIES: No Known Allergies  MEDICATIONS:   Current Facility-Administered Medications   Medication     cefTRIAXone (ROCEPHIN) 2 g vial to attach to  ml bag for ADULTS or NS 50 ml bag for PEDS     Contraindications to both pharmacological and mechanical prophylaxis (must document contraindications for both in this order)     furosemide (LASIX) injection 40 mg     lidocaine (LMX4) cream     lidocaine 1 % 0.1-1 mL     melatonin tablet 3 mg     naloxone (NARCAN) injection 0.2 mg    Or     naloxone (NARCAN) injection 0.4 mg    Or     naloxone (NARCAN) injection 0.2 mg    Or     naloxone (NARCAN) injection 0.4 mg     oxyCODONE (ROXICODONE) tablet 5 mg     potassium chloride ER (KLOR-CON M) CR tablet 40 mEq     prochlorperazine (COMPAZINE) injection 10 mg    Or     prochlorperazine (COMPAZINE) tablet 10 mg    Or     prochlorperazine (COMPAZINE) suppository 25 mg     sertraline (ZOLOFT) tablet 50 mg     sodium chloride (PF) 0.9% PF flush 3 mL     sodium chloride (PF) 0.9% PF flush 3 mL     spironolactone (ALDACTONE) tablet 100 mg     PHYSICAL EXAM:   /56 (BP Location: Left arm)   Pulse 74   Temp 98.6  F (37  C) (Oral)   Resp 16   Ht 1.651 m (5' 5\")   Wt 107 kg (235 lb 14.4 oz)   SpO2 95%   BMI 39.26 kg/m   "   GEN: NAD, older female appears stated age lying in bed  HEENT: No icterus, no lymphadenopathy  HRT: RRR  LUNGS: CTA  ABD: +BS, soft, mild diffuse tenderness without guarding  SKIN: No rash, jaundice  MSKL: b/l LE edema, strength 5/5 all 4 extrems  NEURO: Alert and oriented, appropriate mood and affect     ADDITIONAL DATA:   I reviewed the patient's new clinical lab test results.   Recent Labs   Lab Test 03/16/22  0623 03/15/22  1528 10/31/21  2040   WBC 6.3 6.7 6.5   HGB 8.8* 10.1* 12.9   * 101* 103*   PLT 92* 122* 140*   INR 2.66* 2.47* 1.74*     Recent Labs   Lab Test 03/16/22  0623 03/15/22  1712 10/31/21  2040   POTASSIUM 3.3* 3.3* 3.5   CHLORIDE 106 107 106   CO2 24 22 21*   BUN 9 10 7*   ANIONGAP 6 8 12     Recent Labs   Lab Test 03/16/22  0623 03/15/22  1820 03/15/22  1712 03/15/22  1623 10/31/21  2040   ALBUMIN 1.9* 2.1* 2.3*  --  2.9*   BILITOTAL 5.0*  --  6.0*  --  4.5*   ALT 21  --  31  --  40   AST 38  --  44*  --  63*   PROTEIN  --   --   --  30 *  --      AFP 3/15/22: 4.5  Ammonia 3/16/22: 44    Imaging results:  US-guided paracentesis 3/15/22:  IMPRESSION:  1.  Status post ultrasound-guided paracentesis with 3.7 L of fluid removed, negative for SBP.     CT abdomen/pelvis 3/15/22:  IMPRESSION:   1.  Hepatic cirrhosis and steatosis portal hypertension including large volume ascites, mild splenic enlargement and small caliber upper abdominal varices.  2.  Cholelithiasis.      ASSESSMENT:    Cirrhosis with ascites  This is a 60 y/o female with PMH cirrhosis due to alcohol abuse (sober since October 2021) and hepatitis C (successfully treated in 2021) admitted 3/15 for anasarca due to cirrhosis. Diagnostic paracentesis negative so will stop IV abx. She has improvement in abdominal pain after paracentesis yesterday. LE edema remains the same despite diuretics. Kidney function stable so could consider increasing diuretics if no improvement. She does not have evidence of GI bleed but hemoglobin down  from baseline (12.9 10/2021 and 8.8 this AM). No signs of HE. She had EGD 1/2021 negative for varices but CT scan shows upper abdominal varices and clearly she has portal hypertension with significant ascites. She has EGD scheduled @ [facility] 3/30 and without overt bleeding will plan to keep this as an outpatient for now.     PLAN:  - Stop IV abx  - Low sodium diet  - Continue IV Lasix and oral spironolactone- consider increased dose if no improvement in LE edema  - Hemoglobin monitoring per medicine  - EGD 3/30 as scheduled @ [facility] unless pt develops overt bleeding here  - Continued sobriety is a must        Evangelina Maxwell PA-C  [facility]  3/16/2022 8:40 AM  [phone] (office)    This case was discussed with Dr. Ibrahim who agrees to the above assessment and plan.    Approximately 45 minutes of total time was spent providing patient care, including patient evaluation, reviewing documentation/test result, and .   ________________________________________________________________________     The patient was seen and evaluated in conjunction with Evangelina. Please see her note for details.     Briefly, Marj is a pleasant 62 y/o woman with decompensated hep C and alcohol cirrhosis. Now complicated by ascites and possibly esophageal varices given CT scan findings. Feeling better following paracentesis. Fluid negative for SBP. Initial gram stain report noted GPC however this was changed and now there are no GPCs seen. Continue diuresis. OP EGD unless has signs of overt bleeding. We will follow.     Don Ibrahim DO  3/16/96904:50 PM  [facility]

## 2022-03-16 NOTE — ED NOTES
"[facility] ED Handoff Report    ED Chief Complaint: referral from primary care, liver failure    ED Diagnosis:  (K70.31) Alcoholic cirrhosis of liver with ascites (H)      PMH:    Past Medical History:   Diagnosis Date     Chronic hepatitis C (H)      History of pneumonectomy         Code Status:  No Order     Falls Risk: No Band: Not applicable    Current Living Situation/Residence: lives in a house with sister  Elimination Status: Continent: Yes     Activity Level: Independent    Patients Preferred Language:  English     Needed: No    Vital Signs:  BP (!) 162/73   Pulse 76   Temp 98  F (36.7  C)   Resp 16   Ht 1.651 m (5' 5\")   Wt 109.3 kg (241 lb)   SpO2 97%   BMI 40.10 kg/m         Pain Score: 4/10, in legs, pain started with abdominal swelling, reports that pain \"is not too bad as long and I am not trying to stand on them\".    Is the Patient Confused:  No    Last Food or Drink: 03/15/22 at 2230    Focused Assessment:  Patient was referred to the ED from primary care provider. Ascites accumulation started a couple weeks ago, pt reportedly gained about 25-30 pounds since the start of fluid build up. Paracentesis was performed this evening at 2100, labs sent. Vitally stable. Sister (Ifeanyi) is person of contact if needed, patient is A&Ox4.    Tests Performed: Done: Labs and Imaging    Treatments Provided:  paracentesis    Family Dynamics/Concerns: No    Family Updated On Visitor Policy: Yes    Plan of Care Communicated to Family: Yes    Who Was Updated about Plan of Care: Ifeanyi Liu    Belongings Checklist Done and Signed by Patient: Yes    Medications sent with patient: none    Covid: asymptomatic , negative        RN: Nalini Moon 3/15/2022 11:24 PM       "

## 2022-03-16 NOTE — PLAN OF CARE
Problem: Plan of Care - These are the overarching goals to be used throughout the patient stay.    Goal: Plan of Care Review/Shift Note  Description: The Plan of Care Review/Shift note should be completed every shift.  The Outcome Evaluation is a brief statement about your assessment that the patient is improving, declining, or no change.  This information will be displayed automatically on your shift note.  Outcome: Ongoing, Progressing     Problem: Chest Pain  Goal: Resolution of Chest Pain Symptoms  Outcome: Ongoing, Progressing     Problem: Fluid Volume Excess  Goal: Fluid Balance  Outcome: Ongoing, Progressing     Problem: Pain Chronic (Persistent)  Goal: Acceptable Pain Control and Functional Ability  Outcome: Ongoing, Progressing  Intervention: Manage Persistent Pain  Recent Flowsheet Documentation  Taken 3/16/2022 1300 by Kendy Turner RN  Medication Review/Management: medications reviewed  Taken 3/16/2022 0845 by Kendy Turner RN  Medication Review/Management: medications reviewed     Problem: Electrolyte Imbalance  Goal: Electrolyte Balance  Outcome: Ongoing, Progressing   Goal Outcome Evaluation:    Patient is having severe edema in the bilateral lower extremities from the thighs down to the feet. She is also complaining of pain both lower leg & abdomen relieved by Oxycodone.    Patient is on [employer] protocol. Low result has to cover & recheck per protocol.    Patient stated she's feeling much better, relieved with SOB after the removal of fluid in the abdomen.

## 2022-03-16 NOTE — PROGRESS NOTES
Pt is alert and oriented x 4. Pt is c/o leg and abdominal pain along with bilateral legs swelling, and weight gain.  Pt had US guided paracentesis with 3.7 liters of clear yellow fluid sent to lab.  René Piper called the unit to let RN know that, The Gram stain slide reviewed at the [facility], No organism was seen in the Ascites Fluid.

## 2022-03-16 NOTE — H&P
[facility]    History and Physical - Hospitalist Service       Date of Admission:  3/15/2022    Assessment & Plan      Marj Slater is a 61 year old female admitted on 3/15/2022. She came to the emergency department for evaluation of abdominal pain, increased girth, leg edema, weight gain.    1.  Anasarca secondary to cirrhosis and portal hypertension  CT showed hepatic cirrhosis and steatosis portal hypertension including large volume ascites, mild splenic enlargement and small caliber upper abdominal varices  History of hepatitis C treated last year, last alcohol use October 2021  Status post diagnostic and therapeutic paracentesis, 3.7 L removed  We will add cytology and check alpha-fetoprotein  Start IV furosemide and spironolactone  Meld score 23  GI consult    2.  Rule out SBP  Ascites fluid  but Gram stain showed gram-positive cocci  Start IV ceftriaxone until SBP is ruled out    3.  Coagulopathy  Secondary to liver disease  Monitor for signs of acute blood loss    4.  Jaundice  Secondary to cirrhosis  Total bilirubin 6, up from 4.5 on 10/31/2021  Alk phos 137, stable from before  CT showed no bile duct dilatation, a calcified stone in the otherwise normal appearing gallbladder    5.  Anemia, thrombocytopenia  Platelet count is relatively stable  Hemoglobin 10.1, down from 12.9 on 10/31/2021  No signs of acute blood loss, monitor counts    6.  Hypokalemia  Replace per protocol    7.  Tobacco use disorder  Encouraged complete cessation       Diet: Combination Diet Low Saturated Fat Na <2400mg Diet    DVT Prophylaxis: Contraindicated secondary to coagulopathy and edema  Henderson Catheter: Not present  Central Lines: None  Cardiac Monitoring: ACTIVE order. Indication: sbp  Code Status: Full Code      Clinically Significant Risk Factors Present on Admission              # Coagulation Defect: INR = 2.47 (Ref range: 0.85 - 1.15) and/or PTT = N/A on admission, will monitor for  "bleeding  # Thrombocytopenia: Plts = 122 10e3/uL (Ref range: 150 - 450 10e3/uL) on admission, will monitor for bleeding   # Obesity: Estimated body mass index is 39.26 kg/m  as calculated from the following:    Height as of this encounter: 1.651 m (5' 5\").    Weight as of this encounter: 107 kg (235 lb 14.4 oz).      Disposition Plan   Expected Discharge:  after at least 2 midnights  Anticipated discharge location:  Awaiting care coordination huddle  Delays:    Anasarca       The patient's care was discussed with the Patient.    Marvin Milner MD  Hospitalist Service  [facility]  Securely message with the [employer] Web Console (learn more here)  Text page via [facility] Paging/Directory         ______________________________________________________________________    Chief Complaint   Abdominal pain, weight gain, increased abdominal girth, legs edema and pain    History is obtained from the patient, electronic health record and emergency department physician    History of Present Illness   Marj Slater is a 61 year old female who came to the ED for evaluation of above chief complaint.  Past medical history of chronic hepatitis C treated last year.  Patient quit drinking alcohol in October 2021.  She smokes 3 cigarettes/day and denies marijuana or other drugs.  Over the last couple weeks she has noted increased leg edema, abdominal girth and dyspnea on exertion.  Over the last few days she has noted some diffuse abdominal and pain on her legs.  She has felt chills over the last couple days but denies fevers, sore throat, cough, chest pain or palpitations.  She has had difficulties taking a deep breath and has had some dyspnea on exertion.  Patient has gained approximately 30 pounds from her baseline around 214 pounds.  In the ED, she was afebrile and otherwise hemodynamically stable.  She denies nausea, vomiting, diarrhea, melena or hematochezia.  She had a paracentesis and feels that her " breathing is improved.    Review of Systems    The 10 point Review of Systems is negative other than noted in the HPI or here.     Past Medical History    I have reviewed this patient's medical history and updated it with pertinent information if needed.   Past Medical History:   Diagnosis Date     Chronic hepatitis C (H)      History of pneumonectomy        Past Surgical History   I have reviewed this patient's surgical history and updated it with pertinent information if needed.  History reviewed. No pertinent surgical history.    Social History   I have reviewed this patient's social history and updated it with pertinent information if needed.  Social History     Tobacco Use     Smoking status: None     Smokeless tobacco: None   Substance Use Topics     Alcohol use: None     Drug use: None       Family History     No significant family history, including no history of: Liver disease    Prior to Admission Medications   Prior to Admission Medications   Prescriptions Last Dose Informant Patient Reported? Taking?   sertraline (ZOLOFT) 50 MG tablet 3/15/2022 at Unknown time  Yes Yes   Sig: Take 50 mg by mouth daily      Facility-Administered Medications: None     Allergies   No Known Allergies    Physical Exam   Vital Signs: Temp: 98.6  F (37  C) Temp src: Oral BP: (!) 146/61 Pulse: 70   Resp: 20 SpO2: 97 % O2 Device: None (Room air)    Weight: 235 lbs 14.4 oz    Constitutional: awake and alert  Eyes: extra-ocular muscles intact and icteric bilaterally  ENT: normocepalic, without obvious abnormality, atramatic  Respiratory: no increased work of breathing and good air exchange  Cardiovascular: regular rate and rhythm and normal S1 and S2  GI: normal bowel sounds, soft, distended and tenderness noted diffusely  Skin: no bruising or bleeding and jaundice noted  Musculoskeletal: 3+ pitting edema,  tone is normal  Neurologic: Mental Status Exam:  Level of Alertness:   awake  Orientation:   person, place, time  Motor Exam:   moves all extremities well and symmetrically  Neuropsychiatric: General: normal, calm and normal eye contact    Data   Data reviewed today: I reviewed all medications, new labs and imaging results over the last 24 hours. I personally reviewed no images or EKG's today.    Recent Labs   Lab 03/15/22  1820 03/15/22  1712 03/15/22  1528   WBC  --   --  6.7   HGB  --   --  10.1*   MCV  --   --  101*   PLT  --   --  122*   INR  --   --  2.47*   NA  --  137  --    POTASSIUM  --  3.3*  --    CHLORIDE  --  107  --    CO2  --  22  --    BUN  --  10  --    CR  --  0.68  --    ANIONGAP  --  8  --    DAWIT  --  7.7*  --    GLC  --  99  --    ALBUMIN 2.1* 2.3*  --    PROTTOTAL  --  7.8  --    BILITOTAL  --  6.0*  --    ALKPHOS  --  137*  --    ALT  --  31  --    AST  --  44*  --      Recent Results (from the past 24 hour(s))   CT Abdomen Pelvis w Contrast    Narrative    EXAM: CT ABDOMEN PELVIS W CONTRAST  LOCATION: [facility]  DATE/TIME: 3/15/2022 6:26 PM    INDICATION: Abdominal distention.  COMPARISON: None.  TECHNIQUE: CT scan of the abdomen and pelvis was performed following injection of IV contrast. Multiplanar reformats were obtained. Dose reduction techniques were used.  CONTRAST: Isovue 370, 100 mL.    FINDINGS:   LOWER CHEST: Visualized lungs are clear. No pleural effusion. Heart size normal with no pericardial effusion.     HEPATOBILIARY: Hepatic cirrhosis and steatosis. No liver mass. No bile duct dilatation. A calcified stone in the otherwise normal appearing gallbladder.    PANCREAS: Normal.    SPLEEN: Spleen is enlarged at 14 cm.    ADRENAL GLANDS: Normal.    KIDNEY/BLADDER: Nonobstructing 2 mm right renal stone.  Kidneys, ureters and bladder are otherwise normal.    BOWEL: Large volume ascites. Colonic diverticulosis. Bowel is otherwise normal with no obstruction or inflammatory change.    LYMPH NODES: No lymphadenopathy.    VASCULATURE: Normal caliber abdominal aorta.  Main portal vein  patent. Small caliber upper abdominal varices.     PELVIC ORGANS: No pelvic mass or fluid.    MUSCULOSKELETAL: Unremarkable.      Impression    IMPRESSION:   1.  Hepatic cirrhosis and steatosis portal hypertension including large volume ascites, mild splenic enlargement and small caliber upper abdominal varices.  2.  Cholelithiasis.   US Paracentesis    Narrative    EXAM:  1. PARACENTESIS  2. ULTRASOUND GUIDANCE  LOCATION: [facility]  DATE/TIME: 3/15/2022 8:37 PM    INDICATION: Ascites.    PROCEDURE: Informed consent obtained. Time out performed. The abdomen was prepped and draped in a sterile fashion. 10 mL of 1% lidocaine was infused into local soft tissues. A 5 Thai catheter system was introduced into the abdominal ascites under   ultrasound guidance.    3.7 liters of clear fluid were removed and sent to the lab.    Patient tolerated procedure well.    Ultrasound imaging was obtained and placed in the patient's permanent medical record.      Impression    IMPRESSION:  1.  Status post ultrasound-guided paracentesis.    Reference CPT Code: 41557

## 2022-03-16 NOTE — PROCEDURES
Radiology Procedure Note    Procedure:  US  guided paracentesis    Findings: 3.7 liters of clear yellow fluid sent to lab    Complications:  None    EBL:  Minimal

## 2022-03-16 NOTE — CONSULTS
NUTRITION EDUCATION      REASON FOR ASSESSMENT:  2 gram Na ed    NUTRITION HISTORY:  Information obtained from pt    Pt does not follow a low sodium diet at home-this will be new for her    CURRENT DIET:  Low saturated fat, < 2400 mg Na    NUTRITION DIAGNOSIS:  Food- and nutrition-related knowledge deficit R/t cirrhosis as evidenced by need for therapeutic diet    INTERVENTIONS:    Nutrition Prescription:  2 gram Na    Implementation:      *  Nutrition Education (Content):   A)  Provided handout low sodium nutrition therapy, low sodium shopping guidelines   B)  Discussed appropriate sodium free seasonings to use, foods high and low in Na, low sodium shopping guidelines      *  Nutrition Education (Application):   A)  Discussed current eating habits and recommended alternative food choices      *  Anticipate good compliance      *  Diet Education - refer to Education Flowsheet    Goals:      *  Patient will verbalize understanding of diet-met (pt had no questions at this time)      *  All of the above goals met during the education session    Follow Up/Monitoring:      *  Provided RD contact information for future questions      *  Recommended Out-Patient Nutrition Referral, if further diet instructions are needed

## 2022-03-16 NOTE — PROGRESS NOTES
[facility]    Medicine Progress Note - Hospitalist Service    Date of Admission:  3/15/2022    Assessment & Plan          Marj Slater is a 61 year old female with history of liver cirrhosis presented to emergency department for evaluation of abdominal pain, increased girth, leg edema and weight gain.     Alcoholic liver cirrhosis with ascites: CT showed hepatic cirrhosis and steatosis portal hypertension including large volume ascites, mild splenic enlargement and small caliber upper abdominal varices  History of hepatitis C treated last year, last alcohol use October 2021  Status post diagnostic and therapeutic paracentesis, 3.7 L removed  Meld score 23  - Continue IV furosemide and spironolactone  - Intake and output  - Daily weight  - Ascites analysis shows no SBP. No indication for antibiotics.   - EGD on 3/30 as scheduled with [facility] outpatient    Coagulopathy: due to liver cirrhosis. No signs of bleeding. Monitor. Vitamin K as indicated    Chronic anemia: Hemoglobin 10.1, down from 12.9 on 10/31/2021. No signs of acute blood loss, monitor      Chronic thrombocytopenia: due to liver cirrhosis. Platelet count is relatively stable. Monitor     Hypokalemia: Replace per protocol    Hypomagnesemia: replace and recheck     Tobacco use disorder: Encouraged complete cessation     Obesity: BMI 39.26       Diet: Combination Diet Low Saturated Fat Na <2400mg Diet  Room Service    DVT Prophylaxis: anticoagulation contraindicated due to coagulopathy. No SCD due to bilateral leg swelling  Henderson Catheter: Not present  Central Lines: None  Cardiac Monitoring: ACTIVE order. Indication: sbp  Code Status: Full Code      Disposition Plan   Expected Discharge: 03/18/2022     Anticipated discharge location: home         The patient's care was discussed with the Bedside Nurse, Care Coordinator/ and Patient.    Talia Swanson MD  Hospitalist Service  [facility]  Securely  message with the [employer] Web Console (learn more here)  Text page via [facility] Paging/Directory     ______________________________________________________________________    Interval History   Patient reports that her abdominal distension feels better after the paracentesis. No abdominal pain. She continues to feel that her legs hurt and feels tight. She continues to feel very tired. She wonders what her prognosis is and we had a discussion on that.    Data reviewed today: I reviewed all medications, new labs and imaging results over the last 24 hours.    Physical Exam   Vital Signs: Temp: 97.8  F (36.6  C) Temp src: Oral BP: 117/65 Pulse: 84   Resp: 18 SpO2: 95 % O2 Device: None (Room air)    Weight: 235 lbs 14.4 oz    General appearance: not in acute distress  HEENT: PERRL, EOMI  Lungs: Clear breath sounds in bilateral lung fields  Cardiovascular: Regular rate and rhythm, normal S1-S2  Abdomen: Soft, non tender, no distension, normal bowel sound  Musculoskeletal: No joint swelling  Skin: No rash. Bilateral lower leg pitting edema to the knee  Neurology: AAO ×3.  Cranial nerves II - XII normal.  Normal muscle strength in all four extremities.    Data   Recent Labs   Lab 03/16/22  1501 03/16/22  0623 03/15/22  1820 03/15/22  1712 03/15/22  1712 03/15/22  1528   WBC  --  6.3  --   --   --  6.7   HGB  --  8.8*  --   --   --  10.1*   MCV  --  101*  --   --   --  101*   PLT  --  92*  --   --   --  122*   INR  --  2.66*  --   --   --  2.47*   NA  --  136  --   --  137  --    POTASSIUM 3.8 3.3*  3.3*  --   --  3.3*  --    CHLORIDE  --  106  --   --  107  --    CO2  --  24  --   --  22  --    BUN  --  9  --   --  10  --    CR  --  0.66  --   --  0.68  --    ANIONGAP  --  6  --   --  8  --    DAWIT  --  7.5*  --   --  7.7*  --    GLC  --  113  --   --  99  --    ALBUMIN  --  1.9* 2.1*   < > 2.3*  --    PROTTOTAL  --  6.6  --   --  7.8  --    BILITOTAL  --  5.0*  --   --  6.0*  --    ALKPHOS  --  123*  --   --  137*  --    ALT   --  21  --   --  31  --    AST  --  38  --   --  44*  --     < > = values in this interval not displayed.

## 2022-03-17 ENCOUNTER — APPOINTMENT (OUTPATIENT)
Dept: ULTRASOUND IMAGING | Facility: HOSPITAL | Age: 62
DRG: 432 | End: 2022-03-17
Attending: INTERNAL MEDICINE
Payer: COMMERCIAL

## 2022-03-17 LAB
MAGNESIUM SERPL-MCNC: 1.5 MG/DL (ref 1.8–2.6)
PATH REPORT.COMMENTS IMP SPEC: NORMAL
PATH REPORT.FINAL DX SPEC: NORMAL
PATH REPORT.GROSS SPEC: NORMAL
PATH REPORT.MICROSCOPIC SPEC OTHER STN: NORMAL
PATH REPORT.RELEVANT HX SPEC: NORMAL
POTASSIUM BLD-SCNC: 3.7 MMOL/L (ref 3.5–5)

## 2022-03-17 PROCEDURE — 36415 COLL VENOUS BLD VENIPUNCTURE: CPT | Performed by: INTERNAL MEDICINE

## 2022-03-17 PROCEDURE — 99233 SBSQ HOSP IP/OBS HIGH 50: CPT | Performed by: INTERNAL MEDICINE

## 2022-03-17 PROCEDURE — 93970 EXTREMITY STUDY: CPT

## 2022-03-17 PROCEDURE — 83735 ASSAY OF MAGNESIUM: CPT | Performed by: INTERNAL MEDICINE

## 2022-03-17 PROCEDURE — 84132 ASSAY OF SERUM POTASSIUM: CPT | Performed by: INTERNAL MEDICINE

## 2022-03-17 PROCEDURE — 88305 TISSUE EXAM BY PATHOLOGIST: CPT | Mod: 26 | Performed by: PATHOLOGY

## 2022-03-17 PROCEDURE — 250N000013 HC RX MED GY IP 250 OP 250 PS 637: Performed by: INTERNAL MEDICINE

## 2022-03-17 PROCEDURE — 120N000004 HC R&B MS OVERFLOW

## 2022-03-17 PROCEDURE — 250N000013 HC RX MED GY IP 250 OP 250 PS 637: Performed by: HOSPITALIST

## 2022-03-17 PROCEDURE — 250N000011 HC RX IP 250 OP 636: Performed by: HOSPITALIST

## 2022-03-17 PROCEDURE — 250N000011 HC RX IP 250 OP 636: Performed by: INTERNAL MEDICINE

## 2022-03-17 PROCEDURE — 88112 CYTOPATH CELL ENHANCE TECH: CPT | Mod: 26 | Performed by: PATHOLOGY

## 2022-03-17 RX ORDER — FUROSEMIDE 10 MG/ML
40 INJECTION INTRAMUSCULAR; INTRAVENOUS EVERY 8 HOURS
Status: DISCONTINUED | OUTPATIENT
Start: 2022-03-17 | End: 2022-03-18

## 2022-03-17 RX ORDER — MAGNESIUM SULFATE 4 G/50ML
4 INJECTION INTRAVENOUS ONCE
Status: COMPLETED | OUTPATIENT
Start: 2022-03-17 | End: 2022-03-17

## 2022-03-17 RX ORDER — POTASSIUM CHLORIDE 1500 MG/1
20 TABLET, EXTENDED RELEASE ORAL ONCE
Status: COMPLETED | OUTPATIENT
Start: 2022-03-17 | End: 2022-03-17

## 2022-03-17 RX ADMIN — OXYCODONE HYDROCHLORIDE 5 MG: 5 TABLET ORAL at 00:14

## 2022-03-17 RX ADMIN — FUROSEMIDE 40 MG: 10 INJECTION, SOLUTION INTRAMUSCULAR; INTRAVENOUS at 13:34

## 2022-03-17 RX ADMIN — FUROSEMIDE 40 MG: 10 INJECTION, SOLUTION INTRAMUSCULAR; INTRAVENOUS at 00:15

## 2022-03-17 RX ADMIN — OXYCODONE HYDROCHLORIDE 5 MG: 5 TABLET ORAL at 15:32

## 2022-03-17 RX ADMIN — SERTRALINE HYDROCHLORIDE 50 MG: 50 TABLET ORAL at 08:40

## 2022-03-17 RX ADMIN — OXYCODONE HYDROCHLORIDE 5 MG: 5 TABLET ORAL at 21:01

## 2022-03-17 RX ADMIN — FUROSEMIDE 40 MG: 10 INJECTION, SOLUTION INTRAMUSCULAR; INTRAVENOUS at 20:29

## 2022-03-17 RX ADMIN — OXYCODONE HYDROCHLORIDE 5 MG: 5 TABLET ORAL at 11:08

## 2022-03-17 RX ADMIN — POTASSIUM CHLORIDE 20 MEQ: 1500 TABLET, EXTENDED RELEASE ORAL at 08:40

## 2022-03-17 RX ADMIN — MAGNESIUM SULFATE HEPTAHYDRATE 4 G: 80 INJECTION, SOLUTION INTRAVENOUS at 08:40

## 2022-03-17 RX ADMIN — SPIRONOLACTONE 100 MG: 25 TABLET, FILM COATED ORAL at 08:40

## 2022-03-17 ASSESSMENT — ACTIVITIES OF DAILY LIVING (ADL)
ADLS_ACUITY_SCORE: 8

## 2022-03-17 NOTE — PLAN OF CARE
Problem: Fluid Volume Excess  Goal: Fluid Balance  Outcome: Ongoing, Progressing   Goal Outcome Evaluation:    Plan of Care Reviewed With: patient     Pt continues to have significant swelling of her lower extremities. Scheduled IV lasix. Ultrasound done today of bilateral lower extremities.     Problem: Pain Chronic (Persistent)  Goal: Acceptable Pain Control and Functional Ability  Outcome: Ongoing, Progressing   Pain to bilateral lower legs. Oxycodone given x 1 for pain control. Pt up ambulating in room often and elevating legs while in bed. Will continue to monitor. Chelsey Baxter RN

## 2022-03-17 NOTE — PROGRESS NOTES
"[facility] Progress Note       SUBJECTIVE:  Patient still with significant LE edema but abdomen still feels good after paracentesis 3/15. She has not had BM since arrival. Eating fine.        OBJECTIVE:  /64 (BP Location: Left arm)   Pulse 103   Temp 98.4  F (36.9  C) (Oral)   Resp 20   Ht 1.651 m (5' 5\")   Wt 104.6 kg (230 lb 9.6 oz)   SpO2 92%   BMI 38.37 kg/m    Temp (24hrs), Av.2  F (36.8  C), Min:97.8  F (36.6  C), Max:98.6  F (37  C)    Patient Vitals for the past 72 hrs:   Weight   22 0357 104.6 kg (230 lb 9.6 oz)   03/15/22 2355 107 kg (235 lb 14.4 oz)   03/15/22 1328 109.3 kg (241 lb)       Intake/Output Summary (Last 24 hours) at 3/17/2022 1128  Last data filed at 3/17/2022 0901  Gross per 24 hour   Intake 1163 ml   Output 2000 ml   Net -837 ml        PHYSICAL EXAM  GEN: NAD, female appears stated age sitting up in be  HRT: significant b/l LE edema to thighs  RESP: unlabored  ABD: mildly distended, soft, nontender  SKIN: No rash or jaundice      Additional Data:  I have reviewed the patient's new clinical lab results:     Recent Labs   Lab Test 22  0623 03/15/22  1528 10/31/21  2040   WBC 6.3 6.7 6.5   HGB 8.8* 10.1* 12.9   * 101* 103*   PLT 92* 122* 140*   INR 2.66* 2.47* 1.74*     Recent Labs   Lab Test 22  0600 22  1501 22  0623 03/15/22  1712 10/31/21  2040   POTASSIUM 3.7 3.8 3.3*  3.3* 3.3* 3.5   CHLORIDE  --   --  106 107 106   CO2  --   --  24 22 21*   BUN  --   --  9 10 7*   ANIONGAP  --   --  6 8 12     Recent Labs   Lab Test 22  0623 03/15/22  1820 03/15/22  1712 03/15/22  1623 10/31/21  2040   ALBUMIN 1.9* 2.1* 2.3*  --  2.9*   BILITOTAL 5.0*  --  6.0*  --  4.5*   ALT 21  --  31  --  40   AST 38  --  44*  --  63*   PROTEIN  --   --   --  30 *  --      AFP 3/15/22: 4.5  Ammonia 3/16/22: 44    Imaging results:  US-guided paracentesis 3/15/22:  IMPRESSION:  1.  Status post ultrasound-guided paracentesis with 3.7 L of fluid " removed, negative for SBP.      CT abdomen/pelvis 3/15/22:  IMPRESSION:   1.  Hepatic cirrhosis and steatosis portal hypertension including large volume ascites, mild splenic enlargement and small caliber upper abdominal varices.  2.  Cholelithiasis.       IMPRESSION:  Cirrhosis with ascites  This is a 62 y/o female with PMH cirrhosis due to alcohol abuse (sober since October 2021) and hepatitis C (successfully treated in 2021) admitted 3/15 for anasarca due to cirrhosis. Diagnostic paracentesis negative. She has improvement in abdominal pain after paracentesis 3/15. LE edema remains. Kidney function stable, Lasix increased 3/17. She does not have evidence of GI bleed but hemoglobin down from baseline (12.9 10/2021 and 8.8 yesterday, no check today). No signs of HE. She had EGD 1/2021 negative for varices but CT scan shows upper abdominal varices and clearly she has portal hypertension with significant ascites. She has EGD scheduled @ [facility] 3/30 and without overt bleeding will plan to keep this as an outpatient for now.     PLAN:  - Low sodium diet  - Continue IV Lasix 40 mg q8h and oral spironolactone  - Hemoglobin check in AM  - EGD 3/30 as scheduled @ [facility] unless pt develops overt bleeding here  - Continued sobriety is a must      (Dr. Ibrahim)  Evangelina Maxwell PA-C  [facility]  3/17/2022 11:28 AM  [phone] (office)  ________________________________________________________________________

## 2022-03-17 NOTE — PROGRESS NOTES
[facility]    Medicine Progress Note - Hospitalist Service    Date of Admission:  3/15/2022    Assessment & Plan          Marj Slater is a 61 year old female with history of liver cirrhosis presented to emergency department for evaluation of abdominal pain, increased girth, leg edema and weight gain.     Alcoholic liver cirrhosis with ascites: CT showed hepatic cirrhosis and steatosis portal hypertension including large volume ascites, mild splenic enlargement and small caliber upper abdominal varices  History of hepatitis C treated last year, last alcohol use October 2021  Status post diagnostic and therapeutic paracentesis, 3.7 L removed  Meld score 23  - Continue IV furosemide and spironolactone. Increase Lasix to 40 mg tid  - Intake and output. She has 24 hour output about 2000 ml for the past 2 days  - Daily weight  - Ascites analysis shows no SBP. No indication for antibiotics.   - EGD on 3/30 as scheduled with [facility] outpatient    Bilateral leg swelling: US shows no DVT. Diuresis as above.     Coagulopathy: due to liver cirrhosis. No signs of bleeding. Monitor. Vitamin K as indicated    Chronic anemia: Hemoglobin 10.1, down from 12.9 on 10/31/2021. No signs of acute blood loss, monitor      Chronic thrombocytopenia: due to liver cirrhosis. Platelet count is relatively stable. Monitor     Hypokalemia: Replace per protocol    Hypomagnesemia: replace and recheck     Tobacco use disorder: Encouraged complete cessation     Obesity: BMI 39.26       Diet: Combination Diet Low Saturated Fat Na <2400mg Diet  Room Service    DVT Prophylaxis: anticoagulation contraindicated due to coagulopathy. No SCD due to bilateral leg swelling  Henderson Catheter: Not present  Central Lines: None  Cardiac Monitoring: None  Code Status: Full Code      Disposition Plan   Expected Discharge: 03/19/2022     Anticipated discharge location: home         The patient's care was discussed with the Bedside Nurse, Care  Coordinator/ and Patient and her sister    Talia Swanson MD  Hospitalist Service  [facility]  Securely message with the [employer] Web Console (learn more here)  Text page via [employer] Paging/Directory     ______________________________________________________________________    Interval History   Patient reports that her bilateral leg swelling gets better slowly. But her bilateral legs remain swelling, tight and painful. She has increased urine output for 2-3 hours after lasix was administered. No abdominal pain. She concerns that her liver cirrhosis would get worse. She wonders her prognosis. We discussed about the meaning of MELD score. She states that she would do everything possible to slow down the liver cirrhosis.     Data reviewed today: I reviewed all medications, new labs and imaging results over the last 24 hours.    Physical Exam   Vital Signs: Temp: 98.3  F (36.8  C) Temp src: Oral BP: (!) 143/75 Pulse: 82   Resp: 20 SpO2: 95 % O2 Device: None (Room air)    Weight: 230 lbs 9.6 oz    General appearance: not in acute distress  HEENT: PERRL, EOMI  Lungs: Clear breath sounds in bilateral lung fields  Cardiovascular: Regular rate and rhythm, normal S1-S2  Abdomen: Soft, non tender, no distension, normal bowel sound  Musculoskeletal: No joint swelling  Skin: No rash. Bilateral lower leg pitting edema to the knee  Neurology: AAO ×3.  Cranial nerves II - XII normal.  Normal muscle strength in all four extremities.    Data   Recent Labs   Lab 03/17/22  0600 03/16/22  1501 03/16/22  0623 03/15/22  1820 03/15/22  1712 03/15/22  1712 03/15/22  1528   WBC  --   --  6.3  --   --   --  6.7   HGB  --   --  8.8*  --   --   --  10.1*   MCV  --   --  101*  --   --   --  101*   PLT  --   --  92*  --   --   --  122*   INR  --   --  2.66*  --   --   --  2.47*   NA  --   --  136  --   --  137  --    POTASSIUM 3.7 3.8 3.3*  3.3*  --    < > 3.3*  --    CHLORIDE  --   --  106  --   --  107  --     CO2  --   --  24  --   --  22  --    BUN  --   --  9  --   --  10  --    CR  --   --  0.66  --   --  0.68  --    ANIONGAP  --   --  6  --   --  8  --    DAWIT  --   --  7.5*  --   --  7.7*  --    GLC  --   --  113  --   --  99  --    ALBUMIN  --   --  1.9* 2.1*   < > 2.3*  --    PROTTOTAL  --   --  6.6  --   --  7.8  --    BILITOTAL  --   --  5.0*  --   --  6.0*  --    ALKPHOS  --   --  123*  --   --  137*  --    ALT  --   --  21  --   --  31  --    AST  --   --  38  --   --  44*  --     < > = values in this interval not displayed.

## 2022-03-17 NOTE — PLAN OF CARE
Pt is alert and oriented x 4. Pt is being diuresed, 2-3+ edema on legs from the thighs to the feet. Abdomen is painful,rounded,  distended and very painful. Oxycodone 5 mg has been successful in relieving  pt's pain and discomfort.  Standby assist to the bathroom. Will continue to monitor pt.   Problem: Chest Pain  Goal: Resolution of Chest Pain Symptoms  Outcome: Ongoing, Progressing     Problem: Fluid Volume Excess  Goal: Fluid Balance  Outcome: Ongoing, Progressing   Goal Outcome Evaluation:

## 2022-03-18 LAB
ANION GAP SERPL CALCULATED.3IONS-SCNC: 8 MMOL/L (ref 5–18)
BUN SERPL-MCNC: 9 MG/DL (ref 8–22)
CALCIUM SERPL-MCNC: 7.9 MG/DL (ref 8.5–10.5)
CHLORIDE BLD-SCNC: 97 MMOL/L (ref 98–107)
CO2 SERPL-SCNC: 25 MMOL/L (ref 22–31)
CREAT SERPL-MCNC: 0.78 MG/DL (ref 0.6–1.1)
GFR SERPL CREATININE-BSD FRML MDRD: 85 ML/MIN/1.73M2
GLUCOSE BLD-MCNC: 76 MG/DL (ref 70–125)
HGB BLD-MCNC: 9.6 G/DL (ref 11.7–15.7)
MAGNESIUM SERPL-MCNC: 1.6 MG/DL (ref 1.8–2.6)
POTASSIUM BLD-SCNC: 3.6 MMOL/L (ref 3.5–5)
SODIUM SERPL-SCNC: 130 MMOL/L (ref 136–145)

## 2022-03-18 PROCEDURE — 250N000011 HC RX IP 250 OP 636: Performed by: HOSPITALIST

## 2022-03-18 PROCEDURE — 83735 ASSAY OF MAGNESIUM: CPT | Performed by: INTERNAL MEDICINE

## 2022-03-18 PROCEDURE — 85018 HEMOGLOBIN: CPT | Performed by: PHYSICIAN ASSISTANT

## 2022-03-18 PROCEDURE — 36415 COLL VENOUS BLD VENIPUNCTURE: CPT | Performed by: PHYSICIAN ASSISTANT

## 2022-03-18 PROCEDURE — 250N000013 HC RX MED GY IP 250 OP 250 PS 637: Performed by: INTERNAL MEDICINE

## 2022-03-18 PROCEDURE — 120N000004 HC R&B MS OVERFLOW

## 2022-03-18 PROCEDURE — 250N000013 HC RX MED GY IP 250 OP 250 PS 637: Performed by: HOSPITALIST

## 2022-03-18 PROCEDURE — 80048 BASIC METABOLIC PNL TOTAL CA: CPT | Performed by: INTERNAL MEDICINE

## 2022-03-18 PROCEDURE — 99232 SBSQ HOSP IP/OBS MODERATE 35: CPT | Performed by: INTERNAL MEDICINE

## 2022-03-18 PROCEDURE — 250N000011 HC RX IP 250 OP 636: Performed by: INTERNAL MEDICINE

## 2022-03-18 PROCEDURE — 250N000013 HC RX MED GY IP 250 OP 250 PS 637: Performed by: PHYSICIAN ASSISTANT

## 2022-03-18 RX ORDER — POLYETHYLENE GLYCOL 3350 17 G/17G
17 POWDER, FOR SOLUTION ORAL DAILY
Status: DISCONTINUED | OUTPATIENT
Start: 2022-03-18 | End: 2022-03-25

## 2022-03-18 RX ORDER — FUROSEMIDE 10 MG/ML
60 INJECTION INTRAMUSCULAR; INTRAVENOUS EVERY 8 HOURS
Status: DISCONTINUED | OUTPATIENT
Start: 2022-03-18 | End: 2022-03-21

## 2022-03-18 RX ORDER — POTASSIUM CHLORIDE 1500 MG/1
20 TABLET, EXTENDED RELEASE ORAL ONCE
Status: COMPLETED | OUTPATIENT
Start: 2022-03-18 | End: 2022-03-18

## 2022-03-18 RX ORDER — MAGNESIUM SULFATE HEPTAHYDRATE 40 MG/ML
2 INJECTION, SOLUTION INTRAVENOUS ONCE
Status: COMPLETED | OUTPATIENT
Start: 2022-03-18 | End: 2022-03-18

## 2022-03-18 RX ORDER — ACETAMINOPHEN 325 MG/1
325 TABLET ORAL EVERY 6 HOURS PRN
Status: DISCONTINUED | OUTPATIENT
Start: 2022-03-18 | End: 2022-03-23

## 2022-03-18 RX ADMIN — OXYCODONE HYDROCHLORIDE 5 MG: 5 TABLET ORAL at 23:54

## 2022-03-18 RX ADMIN — FUROSEMIDE 40 MG: 10 INJECTION, SOLUTION INTRAMUSCULAR; INTRAVENOUS at 04:19

## 2022-03-18 RX ADMIN — MAGNESIUM SULFATE HEPTAHYDRATE 2 G: 40 INJECTION, SOLUTION INTRAVENOUS at 06:59

## 2022-03-18 RX ADMIN — POTASSIUM CHLORIDE 20 MEQ: 1500 TABLET, EXTENDED RELEASE ORAL at 08:38

## 2022-03-18 RX ADMIN — FUROSEMIDE 60 MG: 10 INJECTION, SOLUTION INTRAMUSCULAR; INTRAVENOUS at 15:06

## 2022-03-18 RX ADMIN — OXYCODONE HYDROCHLORIDE 5 MG: 5 TABLET ORAL at 15:16

## 2022-03-18 RX ADMIN — POLYETHYLENE GLYCOL 3350 17 G: 17 POWDER, FOR SOLUTION ORAL at 15:06

## 2022-03-18 RX ADMIN — OXYCODONE HYDROCHLORIDE 5 MG: 5 TABLET ORAL at 19:32

## 2022-03-18 RX ADMIN — OXYCODONE HYDROCHLORIDE 5 MG: 5 TABLET ORAL at 08:52

## 2022-03-18 RX ADMIN — SPIRONOLACTONE 100 MG: 25 TABLET, FILM COATED ORAL at 08:38

## 2022-03-18 RX ADMIN — FUROSEMIDE 60 MG: 10 INJECTION, SOLUTION INTRAMUSCULAR; INTRAVENOUS at 20:12

## 2022-03-18 RX ADMIN — SERTRALINE HYDROCHLORIDE 50 MG: 50 TABLET ORAL at 08:39

## 2022-03-18 RX ADMIN — ACETAMINOPHEN 325 MG: 325 TABLET ORAL at 17:23

## 2022-03-18 RX ADMIN — OXYCODONE HYDROCHLORIDE 5 MG: 5 TABLET ORAL at 04:27

## 2022-03-18 ASSESSMENT — ACTIVITIES OF DAILY LIVING (ADL)
ADLS_ACUITY_SCORE: 8

## 2022-03-18 NOTE — PLAN OF CARE
Goal Outcome Evaluation:    VSS, pt is not tele status. Pt has needed pain control this shift with PRN medication as ordered for leg pain. Scheduled medications were reviewed. Pt tolerated a shower this shift. Legs are ace wrapped at this time and elevated. Elevation has been encouraged this shift.  Potassium and magnesium protocols as ordered with re-checks in the AM. Pt has been independent in room. Will continue POC.

## 2022-03-18 NOTE — PLAN OF CARE
Problem: Fluid Volume Excess  Goal: Fluid Balance  Outcome: Ongoing, Progressing   Goal Outcome Evaluation:           IV lasix given as scheduled.  Patient is compliant with staff measuring output.  Vital signs stable.    Patient reports that she is keeping legs elevated as much as possible.  She continues to have edema in legs.      Problem: Pain Chronic (Persistent)  Goal: Acceptable Pain Control and Functional Ability  Outcome: Ongoing, Progressing  Intervention: Develop Pain Management Plan  Recent Flowsheet Documentation  Taken 3/18/2022 0427 by Lissy Becerra RN  Pain Management Interventions: medication (see MAR)  Taken 3/18/2022 0000 by Lissy Becerra, RN  Pain Management Interventions: medication offered but refused  Intervention: Manage Persistent Pain  Recent Flowsheet Documentation  Taken 3/18/2022 0015 by Lissy Becerra RN  Medication Review/Management: medications reviewed     Oxycodone given once for leg pain 8/10 in both legs.  She is alert and oriented and able to ask for pain medication when necessary.      Will continue to monitor.

## 2022-03-18 NOTE — PROGRESS NOTES
[facility] [place]      ASSESSMENT:    Principal Problem:    Anasarca  Active Problems:    Alcoholic cirrhosis of liver with ascites (H)    Chronic hepatitis C virus infection (H)      PLAN:   61 year old female with history of liver cirrhosis presents with abdominal pain and distension, leg edema and weight gain.       Alcoholic liver cirrhosis with ascites: History of hepatitis C treated last year, last alcohol use October 2021  CT showed hepatic cirrhosis and steatosis, portal hypertension including large volume ascites, mild splenic enlargement and small caliber upper abdominal varices.    Status post diagnostic and therapeutic paracentesis 3/15/22, 3.7 L removed, no concern for SBP  - Continue furosemide and spironolactone   - EGD on 3/30 as scheduled with [facility] outpatient      Hypomagnesemia: Replace and recheck      Mild hyponatremia: monitor, continue loop diuresis       Bilateral leg swelling: US shows no DVT.   - continue Diuresis as above.        Coagulopathy: due to liver cirrhosis. No signs of bleeding. Monitor. Vitamin K as indicated       Chronic anemia: Hemoglobin 9.6, down from 12.9 on 10/31/2021. No signs of acute blood loss  - monitor for any clinical signs of blood loss       Chronic thrombocytopenia: due to liver cirrhosis. Platelet count is relatively stable.        Tobacco use disorder: Encouraged complete cessation       Obesity: BMI 39.26           Diet: Combination Diet Low Saturated Fat Na <2400mg Diet  Room Service    DVT Prophylaxis: anticoagulation contraindicated due to coagulopathy. No SCD due to bilateral leg swelling  Henderson Catheter: Not present  Central Lines: None  Cardiac Monitoring: None  Code Status: Full Code          Disposition Plan     Expected Discharge: 03/19/2022     Anticipated discharge location: home              Jere Ochoa  "D.O.              -------------------------------------------------------------------------------------------------------------  SUBJECTIVE: NAD. Denies any nausea, vomiting, abdominal pain, chest pain, SOB, fevers, chills, confusion or headache.   LE edema ongoing    Exam:  /60 (BP Location: Right arm)   Pulse 90   Temp 98.4  F (36.9  C) (Oral)   Resp 18   Ht 1.651 m (5' 5\")   Wt 103.9 kg (229 lb 1.6 oz)   SpO2 94%   BMI 38.12 kg/m    General: NAD  RESPIRATORY: Breathing nonlabored  CARDIOVASCULAR: 2+ le edema bilat.   ABDOMEN:deferred  NEUROLOGIC: Motor grossly intact, speech clear       Diagnostics Reviewed:      Recent Results (from the past 24 hour(s))   Magnesium    Collection Time: 03/18/22  5:58 AM   Result Value Ref Range    Magnesium 1.6 (L) 1.8 - 2.6 mg/dL   Hemoglobin    Collection Time: 03/18/22  5:58 AM   Result Value Ref Range    Hemoglobin 9.6 (L) 11.7 - 15.7 g/dL   Basic metabolic panel    Collection Time: 03/18/22  5:58 AM   Result Value Ref Range    Sodium 130 (L) 136 - 145 mmol/L    Potassium 3.6 3.5 - 5.0 mmol/L    Chloride 97 (L) 98 - 107 mmol/L    Carbon Dioxide (CO2) 25 22 - 31 mmol/L    Anion Gap 8 5 - 18 mmol/L    Urea Nitrogen 9 8 - 22 mg/dL    Creatinine 0.78 0.60 - 1.10 mg/dL    Calcium 7.9 (L) 8.5 - 10.5 mg/dL    Glucose 76 70 - 125 mg/dL    GFR Estimate 85 >60 mL/min/1.73m2       "

## 2022-03-18 NOTE — PROGRESS NOTES
"[facility] Progress Note       SUBJECTIVE:  Patient has a lot of pain due to LE edema and thinks her left leg is more swollen today. Abdomen is getting a little bigger but nothing like it was. She is eating fine. No BM yet.       OBJECTIVE:  /60 (BP Location: Right arm)   Pulse 90   Temp 98.4  F (36.9  C) (Oral)   Resp 18   Ht 1.651 m (5' 5\")   Wt 103.9 kg (229 lb 1.6 oz)   SpO2 94%   BMI 38.12 kg/m    Temp (24hrs), Av.2  F (36.8  C), Min:97.8  F (36.6  C), Max:98.6  F (37  C)    Patient Vitals for the past 72 hrs:   Weight   22 0413 103.9 kg (229 lb 1.6 oz)   22 0357 104.6 kg (230 lb 9.6 oz)   03/15/22 2355 107 kg (235 lb 14.4 oz)   03/15/22 1328 109.3 kg (241 lb)       Intake/Output Summary (Last 24 hours) at 3/17/2022 1128  Last data filed at 3/17/2022 0901  Gross per 24 hour   Intake 1163 ml   Output 2000 ml   Net -837 ml        PHYSICAL EXAM  GEN: NAD, female appears stated age sitting up in bed  HRT: significant b/l LE edema to thighs  RESP: unlabored  ABD: mildly distended, soft, nontender  SKIN: No rash or jaundice      Additional Data:  I have reviewed the patient's new clinical lab results:     Recent Labs   Lab Test 22  0558 22  0623 03/15/22  1528 10/31/21  2040   WBC  --  6.3 6.7 6.5   HGB 9.6* 8.8* 10.1* 12.9   MCV  --  101* 101* 103*   PLT  --  92* 122* 140*   INR  --  2.66* 2.47* 1.74*     Recent Labs   Lab Test 22  0558 22  0600 22  1501 22  0623 03/15/22  1712   POTASSIUM 3.6 3.7 3.8 3.3*  3.3* 3.3*   CHLORIDE 97*  --   --  106 107   CO2 25  --   --  24 22   BUN 9  --   --  9 10   ANIONGAP 8  --   --  6 8     Recent Labs   Lab Test 22  0623 03/15/22  1820 03/15/22  1712 03/15/22  1623 10/31/21  2040   ALBUMIN 1.9* 2.1* 2.3*  --  2.9*   BILITOTAL 5.0*  --  6.0*  --  4.5*   ALT 21  --  31  --  40   AST 38  --  44*  --  63*   PROTEIN  --   --   --  30 *  --      AFP 3/15/22: 4.5  Ammonia 3/16/22: 44    Imaging " results:  US LE b/l 3/17/22:  IMPRESSION:  1.  No deep venous thrombosis in the bilateral lower extremities.    US-guided paracentesis 3/15/22:  IMPRESSION:  1.  Status post ultrasound-guided paracentesis with 3.7 L of fluid removed, negative for SBP.      CT abdomen/pelvis 3/15/22:  IMPRESSION:   1.  Hepatic cirrhosis and steatosis portal hypertension including large volume ascites, mild splenic enlargement and small caliber upper abdominal varices.  2.  Cholelithiasis.       IMPRESSION:  Cirrhosis with ascites  This is a 60 y/o female with PMH cirrhosis due to alcohol abuse (sober since October 2021) and hepatitis C (successfully treated in 2021) admitted 3/15 for anasarca due to cirrhosis. Diagnostic paracentesis negative. She has improvement in abdominal pain after paracentesis 3/15. LE edema remains. Kidney function stable, IV Lasix increased 3/17 but discussed with Hospitalist and IV Lasix will be increased again. Hemoglobin down from baseline but stable and no overt GI bleeding. She had EGD 1/2021 negative for varices but CT scan shows upper abdominal varices and clearly she has portal hypertension with significant ascites. She has EGD scheduled @ [facility] 3/30 and without overt bleeding will plan to keep this as an outpatient for now. No signs of HE.     PLAN:  - Low sodium diet  - New dose of Lasix as of today; IV Lasix 60 mg IV q8h and oral spironolactone 100 mg daily  - EGD 3/30 as scheduled @ [facility] unless pt develops overt bleeding here  - Continued sobriety is a must  - [facility] Hepatology follow up 5/12 in [place] as scheduled      (Dr. Ibrahim)  Evangelina Maxwell PA-C  [facility]  3/18/2022 11:49 AM  [phone] (office)  ________________________________________________________________________

## 2022-03-18 NOTE — PLAN OF CARE
Problem: Plan of Care - These are the overarching goals to be used throughout the patient stay.    Goal: Plan of Care Review/Shift Note  Description: The Plan of Care Review/Shift note should be completed every shift.  The Outcome Evaluation is a brief statement about your assessment that the patient is improving, declining, or no change.  This information will be displayed automatically on your shift note.  Outcome: Ongoing, Progressing  Flowsheets (Taken 3/17/2022 2219)  Plan of Care Reviewed With: patient     Problem: Fall Injury Risk  Goal: Absence of Fall and Fall-Related Injury  Outcome: Ongoing, Progressing  Intervention: Identify and Manage Contributors  Recent Flowsheet Documentation  Taken 3/17/2022 1500 by Nancy Paulino RN  Medication Review/Management: medications reviewed  Intervention: Promote Injury-Free Environment  Recent Flowsheet Documentation  Taken 3/17/2022 1500 by Nancy Paulino RN  Safety Promotion/Fall Prevention:   patient and family education   nonskid shoes/slippers when out of bed   mobility aid in reach     Problem: Fluid Volume Excess  Goal: Fluid Balance  Outcome: Ongoing, Progressing     Problem: Pain Chronic (Persistent)  Goal: Acceptable Pain Control and Functional Ability  Outcome: Ongoing, Progressing  Intervention: Develop Pain Management Plan  Recent Flowsheet Documentation  Taken 3/17/2022 1500 by Nancy Paulino RN  Pain Management Interventions: medication (see MAR)  Intervention: Manage Persistent Pain  Recent Flowsheet Documentation  Taken 3/17/2022 1500 by Nancy Paulino RN  Medication Review/Management: medications reviewed   Goal Outcome Evaluation:    Plan of Care Reviewed With: carmen Mcmahan had a good shift. Up independently in room. Does have ascites and bilateral lower extremity edema that is painful. PRN oxycodone is helpful with this. Good urine output with lasix. No BM this shift.

## 2022-03-18 NOTE — PROGRESS NOTES
Care Management Follow Up    Length of Stay (days): 3    Expected Discharge Date: 03/19/2022     Concerns to be Addressed:     Discharge planning  Patient plan of care discussed at interdisciplinary rounds: Yes    Anticipated Discharge Disposition: Home     Anticipated Discharge Services:  No skilled services anticipated  Anticipated Discharge DME:  N/A    Patient/family educated on Medicare website which has current facility and service quality ratings:    Education Provided on the Discharge Plan:  yes  Patient/Family in Agreement with the Plan:  yes    Referrals Placed by CM/SW:  Not at this time  Private pay costs discussed: Not applicable    Additional Information:  Chart reviewed.   Patient will return home, lives with her sister and is independent at baseline.  Patient reports she sees a therapist and psychiatrist with [facility] which has been helpful. She reports she also attends AA meetings. At this time, pt denies needs. Care management will follow progression of care for identified needs.    Bonnie Mann, LELOSW

## 2022-03-19 LAB
AMMONIA PLAS-SCNC: 50 UMOL/L (ref 11–35)
ANION GAP SERPL CALCULATED.3IONS-SCNC: 7 MMOL/L (ref 5–18)
BUN SERPL-MCNC: 11 MG/DL (ref 8–22)
CALCIUM SERPL-MCNC: 8.3 MG/DL (ref 8.5–10.5)
CHLORIDE BLD-SCNC: 98 MMOL/L (ref 98–107)
CO2 SERPL-SCNC: 28 MMOL/L (ref 22–31)
CREAT SERPL-MCNC: 0.77 MG/DL (ref 0.6–1.1)
GFR SERPL CREATININE-BSD FRML MDRD: 86 ML/MIN/1.73M2
GLUCOSE BLD-MCNC: 93 MG/DL (ref 70–125)
HOLD SPECIMEN: NORMAL
MAGNESIUM SERPL-MCNC: 1.7 MG/DL (ref 1.8–2.6)
MAGNESIUM SERPL-MCNC: 2 MG/DL (ref 1.8–2.6)
POTASSIUM BLD-SCNC: 3.7 MMOL/L (ref 3.5–5)
POTASSIUM BLD-SCNC: 4 MMOL/L (ref 3.5–5)
SODIUM SERPL-SCNC: 133 MMOL/L (ref 136–145)

## 2022-03-19 PROCEDURE — 258N000003 HC RX IP 258 OP 636: Performed by: INTERNAL MEDICINE

## 2022-03-19 PROCEDURE — 36415 COLL VENOUS BLD VENIPUNCTURE: CPT | Performed by: INTERNAL MEDICINE

## 2022-03-19 PROCEDURE — 83735 ASSAY OF MAGNESIUM: CPT | Performed by: HOSPITALIST

## 2022-03-19 PROCEDURE — 82140 ASSAY OF AMMONIA: CPT | Performed by: INTERNAL MEDICINE

## 2022-03-19 PROCEDURE — 250N000013 HC RX MED GY IP 250 OP 250 PS 637: Performed by: INTERNAL MEDICINE

## 2022-03-19 PROCEDURE — 250N000011 HC RX IP 250 OP 636: Performed by: INTERNAL MEDICINE

## 2022-03-19 PROCEDURE — 82310 ASSAY OF CALCIUM: CPT | Performed by: INTERNAL MEDICINE

## 2022-03-19 PROCEDURE — 84132 ASSAY OF SERUM POTASSIUM: CPT | Performed by: INTERNAL MEDICINE

## 2022-03-19 PROCEDURE — 250N000013 HC RX MED GY IP 250 OP 250 PS 637: Performed by: PHYSICIAN ASSISTANT

## 2022-03-19 PROCEDURE — 120N000004 HC R&B MS OVERFLOW

## 2022-03-19 PROCEDURE — 83735 ASSAY OF MAGNESIUM: CPT | Performed by: INTERNAL MEDICINE

## 2022-03-19 PROCEDURE — 250N000013 HC RX MED GY IP 250 OP 250 PS 637: Performed by: HOSPITALIST

## 2022-03-19 PROCEDURE — 99232 SBSQ HOSP IP/OBS MODERATE 35: CPT | Performed by: INTERNAL MEDICINE

## 2022-03-19 RX ORDER — LACTULOSE 10 G/15ML
20 SOLUTION ORAL 3 TIMES DAILY
Status: DISCONTINUED | OUTPATIENT
Start: 2022-03-19 | End: 2022-03-22

## 2022-03-19 RX ORDER — MAGNESIUM OXIDE 400 MG/1
400 TABLET ORAL 2 TIMES DAILY
Status: DISCONTINUED | OUTPATIENT
Start: 2022-03-19 | End: 2022-03-19

## 2022-03-19 RX ORDER — POTASSIUM CHLORIDE 1500 MG/1
20 TABLET, EXTENDED RELEASE ORAL ONCE
Status: COMPLETED | OUTPATIENT
Start: 2022-03-19 | End: 2022-03-19

## 2022-03-19 RX ORDER — MAGNESIUM OXIDE 400 MG/1
400 TABLET ORAL 2 TIMES DAILY
Status: COMPLETED | OUTPATIENT
Start: 2022-03-19 | End: 2022-03-20

## 2022-03-19 RX ADMIN — FUROSEMIDE 60 MG: 10 INJECTION, SOLUTION INTRAMUSCULAR; INTRAVENOUS at 14:00

## 2022-03-19 RX ADMIN — FUROSEMIDE 60 MG: 10 INJECTION, SOLUTION INTRAMUSCULAR; INTRAVENOUS at 05:39

## 2022-03-19 RX ADMIN — OXYCODONE HYDROCHLORIDE 5 MG: 5 TABLET ORAL at 16:41

## 2022-03-19 RX ADMIN — MAGNESIUM OXIDE TAB 400 MG (241.3 MG ELEMENTAL MG) 400 MG: 400 (241.3 MG) TAB at 08:36

## 2022-03-19 RX ADMIN — FUROSEMIDE 60 MG: 10 INJECTION, SOLUTION INTRAMUSCULAR; INTRAVENOUS at 20:23

## 2022-03-19 RX ADMIN — MAGNESIUM SULFATE HEPTAHYDRATE 3 G: 500 INJECTION, SOLUTION INTRAMUSCULAR; INTRAVENOUS at 11:10

## 2022-03-19 RX ADMIN — POLYETHYLENE GLYCOL 3350 17 G: 17 POWDER, FOR SOLUTION ORAL at 08:36

## 2022-03-19 RX ADMIN — SERTRALINE HYDROCHLORIDE 50 MG: 50 TABLET ORAL at 08:36

## 2022-03-19 RX ADMIN — POTASSIUM CHLORIDE 20 MEQ: 1500 TABLET, EXTENDED RELEASE ORAL at 19:37

## 2022-03-19 RX ADMIN — OXYCODONE HYDROCHLORIDE 5 MG: 5 TABLET ORAL at 07:19

## 2022-03-19 RX ADMIN — SPIRONOLACTONE 100 MG: 25 TABLET, FILM COATED ORAL at 08:35

## 2022-03-19 RX ADMIN — OXYCODONE HYDROCHLORIDE 5 MG: 5 TABLET ORAL at 22:54

## 2022-03-19 RX ADMIN — MAGNESIUM OXIDE TAB 400 MG (241.3 MG ELEMENTAL MG) 400 MG: 400 (241.3 MG) TAB at 20:23

## 2022-03-19 RX ADMIN — LACTULOSE 20 G: 10 SOLUTION ORAL at 21:34

## 2022-03-19 ASSESSMENT — ACTIVITIES OF DAILY LIVING (ADL)
ADLS_ACUITY_SCORE: 8

## 2022-03-19 NOTE — PROGRESS NOTES
[facility] [place] [facility] Progress Note      ASSESSMENT:    Principal Problem:    Anasarca  Active Problems:    Alcoholic cirrhosis of liver with ascites (H)    Chronic hepatitis C virus infection (H)      PLAN:   61 year old female with history of liver cirrhosis presents with abdominal pain and distension, leg edema and weight gain.       Alcoholic liver cirrhosis with ascites: History of hepatitis C treated last year, last alcohol use October 2021  CT showed hepatic cirrhosis and steatosis, portal hypertension including large volume ascites, mild splenic enlargement and small caliber upper abdominal varices.  Status post diagnostic and therapeutic paracentesis 3/15/22, 3.7 L removed, no concern for SBP  - Continue furosemide and spironolactone  - keep inpatient for continued aggressive IV diuresis that patient is tolerating well  - EGD on 3/30 as scheduled with [facility] outpatient, [facility] Hepatology follow up 5/12 in [place] as scheduled  - would be acceptable for GI to sign off while inpatient  - [facility] to manage diuresis      Hypomagnesemia: Replace and recheck      Mild hyponatremia: monitor, continue loop diuresis       Bilateral leg swelling: US shows no DVT.   - continue aggressive IV iuresis as above, monitor inpatient labs       Coagulopathy: due to liver cirrhosis. No signs of bleeding. Monitor. Vitamin K as indicated       Chronic anemia: Hemoglobin 9.6, down from 12.9 on 10/31/2021. No signs of acute blood loss  - monitor for any clinical signs of blood loss       Chronic thrombocytopenia: due to liver cirrhosis.   -- monitor as outpatient       Tobacco use disorder: Encouraged complete cessation       Obesity: BMI 39.26           Diet: Combination Diet Low Saturated Fat Na <2400mg Diet   DVT Prophylaxis: Ambulate. Anticoagulation contraindicated due to coagulopathy. No SCD due to bilateral leg swelling  Code Status: Full Code          Disposition Plan     Expected Discharge: 03/20/2022     Anticipated  "discharge location: home              Jere Ochoa KIYA              -------------------------------------------------------------------------------------------------------------  SUBJECTIVE: NAD. Denies any nausea, vomiting, abdominal pain, chest pain, SOB, fevers, chills, confusion or headache.   LE edema improving    Exam:  /78 (BP Location: Left arm)   Pulse 87   Temp 98.3  F (36.8  C) (Oral)   Resp 16   Ht 1.651 m (5' 5\")   Wt 101.7 kg (224 lb 1.6 oz)   SpO2 92%   BMI 37.29 kg/m    General: NAD  RESPIRATORY: Breathing nonlabored  CARDIOVASCULAR: 2+ le edema bilat.   ABDOMEN:s, nt, nondistended  NEUROLOGIC: Motor grossly intact, speech clear       Diagnostics Reviewed:      Recent Results (from the past 24 hour(s))   Extra Purple Top Tube    Collection Time: 03/19/22  5:54 AM   Result Value Ref Range    Hold Specimen JI    Magnesium    Collection Time: 03/19/22  5:55 AM   Result Value Ref Range    Magnesium 1.7 (L) 1.8 - 2.6 mg/dL   Basic metabolic panel    Collection Time: 03/19/22  5:55 AM   Result Value Ref Range    Sodium 133 (L) 136 - 145 mmol/L    Potassium 4.0 3.5 - 5.0 mmol/L    Chloride 98 98 - 107 mmol/L    Carbon Dioxide (CO2) 28 22 - 31 mmol/L    Anion Gap 7 5 - 18 mmol/L    Urea Nitrogen 11 8 - 22 mg/dL    Creatinine 0.77 0.60 - 1.10 mg/dL    Calcium 8.3 (L) 8.5 - 10.5 mg/dL    Glucose 93 70 - 125 mg/dL    GFR Estimate 86 >60 mL/min/1.73m2       "

## 2022-03-19 NOTE — SIGNIFICANT EVENT
Significant Event Note    Time of event: 4:59 PM March 19, 2022    Description of event:  Worsening tremor    Plan:  Check Ammonia, start lactulose if elevated  Check K, Mag, replace if low       Jere Ochoa, DO

## 2022-03-19 NOTE — PROGRESS NOTES
Care Management Follow Up    Length of Stay (days): 4    Expected Discharge Date: 03/20/2022       Concerns to be Addressed:   IV Lasix every 8 hours.  Patient plan of care discussed at interdisciplinary rounds: Yes    Anticipated Discharge Disposition:  Home.     Anticipated Discharge Services:  None (follow up with outpatient mental health and AA meetings)  Anticipated Discharge DME:  Per therapy (if indicated). Patient is requesting a hospital bed for home use.     Patient/family educated on Medicare website which has current facility and service quality ratings:  NA  Education Provided on the Discharge Plan:  Per team  Patient/Family in Agreement with the Plan:  Yes     Referrals Placed by CM/SW:  None  Private pay costs discussed: Not applicable     Additional Information:  Patient lives with her sister and is independent at baseline. She has steps to get up to the bedroom and is requesting a hospital bed for home. Left a sticky note for MD but uncertain if she will qualify for coverage under her insurance. Explained that they could rent a bed from a DME company by using a credit card also (if her insurance denies coverage). She plans to follow up with outpatient mental health and psychiatry and AA meetings.     Leesa Abernathy RN

## 2022-03-19 NOTE — PROGRESS NOTES
[facility] DIGESTIVE HEALTH PROGRESS NOTE      Subjective:              Feeling well today. Weight down to 101.7kg from 109.3kg from admission.  No shortness of breath.    Objective:                Body mass index is 37.29 kg/m .  Vital signs in last 24 hrs;  Temp:  [97.7  F (36.5  C)-98.5  F (36.9  C)] 98.3  F (36.8  C)  Pulse:  [] 87  Resp:  [16-20] 16  BP: (121-136)/(48-83) 127/78  SpO2:  [91 %-96 %] 92 %    Physical Exam:   General: Comfortable appearing. Awake.   Cardiovascular: Regular rate. Legs wrapped. Less LE edema.   Chest: Non-labored breathing. Symmetric chest rise.   Abdomen: soft, non-tender, non-distended  Neurologic: Alert, no focal defects.     Current Labs:  CMP Results: Recent Labs   Lab 03/19/22  0555 03/18/22  0558 03/17/22  0600 03/16/22  1501 03/16/22  0623 03/15/22  1712   * 130*  --   --  136 137   POTASSIUM 4.0 3.6 3.7 3.8 3.3*  3.3* 3.3*   CHLORIDE 98 97*  --   --  106 107   CO2 28 25  --   --  24 22   GLC 93 76  --   --  113 99   BUN 11 9  --   --  9 10   CR 0.77 0.78  --   --  0.66 0.68   BILITOTAL  --   --   --   --  5.0* 6.0*   ALKPHOS  --   --   --   --  123* 137*   ALT  --   --   --   --  21 31   AST  --   --   --   --  38 44*      CBC  Recent Labs   Lab 03/18/22  0558 03/16/22  0623 03/15/22  1528   WBC  --  6.3 6.7   RBC  --  2.58* 3.02*   HGB 9.6* 8.8* 10.1*   HCT  --  26.0* 30.6*   MCV  --  101* 101*   RDW  --  16.5* 16.5*   PLT  --  92* 122*     INR  Recent Labs   Lab 03/16/22  0623 03/15/22  1528   INR 2.66* 2.47*      LIPASENo lab results found in last 7 days.    Relevant Imaging:  No new imaging.     Assessment:       # Decompensated alcohol and hep c cirrhosis  # Decompensated with ascites, volume overload.   - s/p treatment of of Hep C.   - Diuresing well here. Tolerating lasix 60 mg IV q8 and spironolactone 100 mg daily.   - Weight down ~8 kg from admission.   - Symptomatically feeling better.           Plan:     - Continue 2 gm sodium diet.   - Continue with IV  diuresis today  - Continue spironolactone  - Alcohol cessation  - Has a [facility] hep f/u scheduled 5/12 in [place]. She should keep that.   - Has OP EGD scheduled 3/30.   - Check BMP in ~1 week post discharge  - GI will now sign off. Case discussed with Dr. Ochoa. Please call with additional questions or concerns.           Don Ibrahim, DO  Thank you for the opportunity to participate in the care of this patient.   Please feel free to call me with any questions or concerns.  Phone number [phone].

## 2022-03-19 NOTE — PLAN OF CARE
Heart Failure Care Map  GOALS TO BE MET BEFORE DISCHARGE:    1. Decrease congestion and/or edema with diuretic therapy to achieve near optimal volume status.     Dyspnea improved: Denied any dyspnea.   Edema improved: Still edematous but improved per patient.        Net I/O and Weights since admission:   02/17 0700 - 03/19 0659  In: 3819 [P.O.:3760; I.V.:59]  Out: 8450 [Urine:8450]  Net: -4631     Vitals:    03/15/22 1328 03/15/22 2355 03/17/22 0357 03/18/22 0413   Weight: 109.3 kg (241 lb) 107 kg (235 lb 14.4 oz) 104.6 kg (230 lb 9.6 oz) 103.9 kg (229 lb 1.6 oz)    03/19/22 0540   Weight: 101.7 kg (224 lb 1.6 oz)       2.  O2 sats > 90% on room air, or at prior home O2 therapy level.      Able to wean O2 this shift to keep sats above 90%?: Yes, satisfactory for discharge.   Does patient use Home O2? No          Current oxygenation status:   SpO2: 93 %     O2 Device: None (Room air),      3.  Tolerates ambulation and mobility near baseline.     Ambulation: Ambulating to the bathroom independently.   Times patient ambulated with staff this shift: 0    Please review the Heart Failure Care Map for additional HF goal outcomes.         Complaint of bilateral leg pain. PRN oxycodone given once tonight.    Alysa Conklin RN  3/19/2022

## 2022-03-19 NOTE — PLAN OF CARE
Goal Outcome Evaluation:    Plan of Care Reviewed With: patient   VS, medications, increasing activity have been reviewed with pt. Potassium and magnesium protocols with an additional IV magnesium bump as ordered. PRN pain medication as ordered. Pt is not tele status. Pt legs are ace wrapped, pt refused richard stockings. Pt has been independent in room, however with ambulating in halls, a walker was needed for occasional unsteady gait. This afternoon a tremor was noted while pt was reaching for items. Pt is not on CIWA, but will monitor for signs and symptoms of withdrawal. Will continue POC.

## 2022-03-19 NOTE — PLAN OF CARE
"  Problem: Plan of Care - These are the overarching goals to be used throughout the patient stay.    Goal: Plan of Care Review/Shift Note  Description: The Plan of Care Review/Shift note should be completed every shift.  The Outcome Evaluation is a brief statement about your assessment that the patient is improving, declining, or no change.  This information will be displayed automatically on your shift note.  Outcome: Ongoing, Progressing  Goal: Patient-Specific Goal (Individualized)  Description: You can add care plan individualizations to a care plan. Examples of Individualization might be:  \"Parent requests to be called daily at 9am for status\", \"I have a hard time hearing out of my right ear\", or \"Do not touch me to wake me up as it startles me\".  Outcome: Ongoing, Progressing     Problem: Fall Injury Risk  Goal: Absence of Fall and Fall-Related Injury  Outcome: Ongoing, Progressing  Intervention: Identify and Manage Contributors  Recent Flowsheet Documentation  Taken 3/18/2022 1612 by Nancy Paulino, RN  Medication Review/Management: medications reviewed  Intervention: Promote Injury-Free Environment  Recent Flowsheet Documentation  Taken 3/18/2022 1612 by Nancy Paulino, RN  Safety Promotion/Fall Prevention:   patient and family education   nonskid shoes/slippers when out of bed   mobility aid in reach   clutter free environment maintained     Problem: Fluid Volume Excess  Goal: Fluid Balance  Outcome: Ongoing, Progressing   Goal Outcome Evaluation:    Marj had a good shift. Up independently in her room. Voiding well. Does still have BLE swelling. Ace wraps removed at bed time due to discomfort.                "

## 2022-03-20 LAB
ANION GAP SERPL CALCULATED.3IONS-SCNC: 14 MMOL/L (ref 5–18)
BUN SERPL-MCNC: 10 MG/DL (ref 8–22)
CALCIUM SERPL-MCNC: 8.4 MG/DL (ref 8.5–10.5)
CHLORIDE BLD-SCNC: 93 MMOL/L (ref 98–107)
CO2 SERPL-SCNC: 27 MMOL/L (ref 22–31)
CREAT SERPL-MCNC: 0.88 MG/DL (ref 0.6–1.1)
ERYTHROCYTE [DISTWIDTH] IN BLOOD BY AUTOMATED COUNT: 15.9 % (ref 10–15)
GFR SERPL CREATININE-BSD FRML MDRD: 73 ML/MIN/1.73M2
GLUCOSE BLD-MCNC: 78 MG/DL (ref 70–125)
HCT VFR BLD AUTO: 29.3 % (ref 35–47)
HGB BLD-MCNC: 9.8 G/DL (ref 11.7–15.7)
MAGNESIUM SERPL-MCNC: 1.8 MG/DL (ref 1.8–2.6)
MCH RBC QN AUTO: 33.3 PG (ref 26.5–33)
MCHC RBC AUTO-ENTMCNC: 33.4 G/DL (ref 31.5–36.5)
MCV RBC AUTO: 100 FL (ref 78–100)
PLATELET # BLD AUTO: 137 10E3/UL (ref 150–450)
POTASSIUM BLD-SCNC: 3.6 MMOL/L (ref 3.5–5)
RBC # BLD AUTO: 2.94 10E6/UL (ref 3.8–5.2)
SODIUM SERPL-SCNC: 134 MMOL/L (ref 136–145)
WBC # BLD AUTO: 7.6 10E3/UL (ref 4–11)

## 2022-03-20 PROCEDURE — 250N000013 HC RX MED GY IP 250 OP 250 PS 637: Performed by: INTERNAL MEDICINE

## 2022-03-20 PROCEDURE — 36415 COLL VENOUS BLD VENIPUNCTURE: CPT | Performed by: INTERNAL MEDICINE

## 2022-03-20 PROCEDURE — 250N000013 HC RX MED GY IP 250 OP 250 PS 637: Performed by: PHYSICIAN ASSISTANT

## 2022-03-20 PROCEDURE — 250N000013 HC RX MED GY IP 250 OP 250 PS 637: Performed by: HOSPITALIST

## 2022-03-20 PROCEDURE — 82310 ASSAY OF CALCIUM: CPT | Performed by: INTERNAL MEDICINE

## 2022-03-20 PROCEDURE — 99232 SBSQ HOSP IP/OBS MODERATE 35: CPT | Performed by: INTERNAL MEDICINE

## 2022-03-20 PROCEDURE — 83735 ASSAY OF MAGNESIUM: CPT | Performed by: INTERNAL MEDICINE

## 2022-03-20 PROCEDURE — 85014 HEMATOCRIT: CPT | Performed by: INTERNAL MEDICINE

## 2022-03-20 PROCEDURE — 250N000011 HC RX IP 250 OP 636: Performed by: INTERNAL MEDICINE

## 2022-03-20 PROCEDURE — 120N000004 HC R&B MS OVERFLOW

## 2022-03-20 RX ORDER — POTASSIUM CHLORIDE 1500 MG/1
20 TABLET, EXTENDED RELEASE ORAL ONCE
Status: COMPLETED | OUTPATIENT
Start: 2022-03-20 | End: 2022-03-20

## 2022-03-20 RX ADMIN — OXYCODONE HYDROCHLORIDE 5 MG: 5 TABLET ORAL at 05:12

## 2022-03-20 RX ADMIN — POTASSIUM CHLORIDE 20 MEQ: 1500 TABLET, EXTENDED RELEASE ORAL at 16:39

## 2022-03-20 RX ADMIN — POLYETHYLENE GLYCOL 3350 17 G: 17 POWDER, FOR SOLUTION ORAL at 08:56

## 2022-03-20 RX ADMIN — FUROSEMIDE 60 MG: 10 INJECTION, SOLUTION INTRAMUSCULAR; INTRAVENOUS at 13:02

## 2022-03-20 RX ADMIN — MAGNESIUM OXIDE TAB 400 MG (241.3 MG ELEMENTAL MG) 400 MG: 400 (241.3 MG) TAB at 20:42

## 2022-03-20 RX ADMIN — FUROSEMIDE 60 MG: 10 INJECTION, SOLUTION INTRAMUSCULAR; INTRAVENOUS at 20:41

## 2022-03-20 RX ADMIN — SPIRONOLACTONE 100 MG: 25 TABLET, FILM COATED ORAL at 08:55

## 2022-03-20 RX ADMIN — SERTRALINE HYDROCHLORIDE 50 MG: 50 TABLET ORAL at 08:55

## 2022-03-20 RX ADMIN — LACTULOSE 20 G: 10 SOLUTION ORAL at 08:55

## 2022-03-20 RX ADMIN — POTASSIUM CHLORIDE 20 MEQ: 1500 TABLET, EXTENDED RELEASE ORAL at 08:54

## 2022-03-20 RX ADMIN — FUROSEMIDE 60 MG: 10 INJECTION, SOLUTION INTRAMUSCULAR; INTRAVENOUS at 05:01

## 2022-03-20 RX ADMIN — MAGNESIUM OXIDE TAB 400 MG (241.3 MG ELEMENTAL MG) 400 MG: 400 (241.3 MG) TAB at 08:55

## 2022-03-20 RX ADMIN — OXYCODONE HYDROCHLORIDE 5 MG: 5 TABLET ORAL at 20:52

## 2022-03-20 RX ADMIN — LACTULOSE 20 G: 10 SOLUTION ORAL at 13:01

## 2022-03-20 RX ADMIN — OXYCODONE HYDROCHLORIDE 5 MG: 5 TABLET ORAL at 13:01

## 2022-03-20 ASSESSMENT — ACTIVITIES OF DAILY LIVING (ADL)
ADLS_ACUITY_SCORE: 8

## 2022-03-20 NOTE — PLAN OF CARE
Problem: Fluid Volume Excess  Goal: Fluid Balance  3/20/2022 1416 by Alysa Conklin, ADRYAN  Outcome: Ongoing, Progressing  3/20/2022 0607 by Alysa Conklin RN  Outcome: Ongoing, Progressing     Problem: Pain Chronic (Persistent)  Goal: Acceptable Pain Control and Functional Ability  3/20/2022 1416 by Alysa Conklin RN  Outcome: Ongoing, Progressing  3/20/2022 0607 by Alysa Conklin RN  Outcome: Ongoing, Progressing  Intervention: Develop Pain Management Plan  Recent Flowsheet Documentation  Taken 3/20/2022 0854 by Alysa Conklin, RN  Pain Management Interventions: declines  Taken 3/20/2022 0512 by Alysa Conklin RN  Pain Management Interventions: medication (see MAR)  Taken 3/20/2022 0019 by Alysa Conklin RN  Pain Management Interventions: declines     Goal Outcome Evaluation:         Getting lactulose for her elevated ammonia. Mild tremors noted on both arms. No bowel movement so far. Complaint of gas discomfort and leg pain. PRN oxycodone given once. Staying for another day of IV diurese. Getting IV lasix. Lower extremity edema improving. Lost 6 lbs from yesterday.

## 2022-03-20 NOTE — PROGRESS NOTES
[facility] Progress Note      ASSESSMENT:    Principal Problem:    Anasarca  Active Problems:    Alcoholic cirrhosis of liver with ascites (H)    Chronic hepatitis C virus infection (H)      PLAN:   61 year old female with history of liver cirrhosis presents with abdominal pain and distension, leg edema and weight gain.       Alcoholic liver cirrhosis with ascites: History of hepatitis C treated last year, last alcohol use October 2021  CT showed hepatic cirrhosis and steatosis, portal hypertension including large volume ascites, mild splenic enlargement and small caliber upper abdominal varices.  Status post diagnostic and therapeutic paracentesis 3/15/22, 3.7 L removed, no concern for SBP  - Continue furosemide and spironolactone  - keep inpatient for continued aggressive IV diuresis that patient is tolerating well  - EGD on 3/30 as scheduled with [facility] outpatient, [facility] Hepatology follow up 5/12 in [place] as scheduled  - would be acceptable for GI to sign off while inpatient  - [facility] to manage diuresis      Hepatic encephalopathy: noted to have elevated NH3 as part of asterixis workup  -- started lactulose      Hypomagnesemia: Replace and recheck      Mild hyponatremia: monitor, continue loop diuresis       Bilateral leg swelling: US shows no DVT.   - continue aggressive IV iuresis as above, monitor inpatient labs       Coagulopathy: due to liver cirrhosis. No signs of bleeding. Monitor. Vitamin K as indicated       Chronic anemia: Hemoglobin 9.8 3/20, down from 12.9 on 10/31/2021. No signs of acute blood loss  - monitor for any clinical signs of blood loss       Chronic thrombocytopenia: due to liver cirrhosis.   -- monitor as outpatient       Tobacco use disorder: Encouraged complete cessation       Obesity: BMI 39.26           Diet: Combination Diet Low Saturated Fat Na <2400mg Diet   DVT Prophylaxis: Ambulate. Anticoagulation contraindicated due to coagulopathy. No SCD due to bilateral leg  "swelling  Code Status: Full Code          Disposition Plan     Expected Discharge: 03/21/2022     Anticipated discharge location: home              Jere Ochoa KIYA              -------------------------------------------------------------------------------------------------------------  SUBJECTIVE: NAD. Denies any nausea, vomiting, abdominal pain, chest pain, SOB, fevers, chills, confusion or headache.   LE edema improving  Noted is worsening hand shaking    Exam:  /68 (BP Location: Left arm)   Pulse 101   Temp 98.3  F (36.8  C) (Oral)   Resp 20   Ht 1.651 m (5' 5\")   Wt 99 kg (218 lb 3.2 oz)   SpO2 96%   BMI 36.31 kg/m    General: NAD  RESPIRATORY: Breathing nonlabored  CARDIOVASCULAR: 2+ le edema bilat.   ABDOMEN:s, nt, nondistended  NEUROLOGIC: Motor grossly intact, speech clear, + asterixis       Diagnostics Reviewed:      Recent Results (from the past 24 hour(s))   Ammonia    Collection Time: 03/19/22  5:10 PM   Result Value Ref Range    Ammonia 50 (H) 11 - 35 umol/L   Magnesium    Collection Time: 03/19/22  5:10 PM   Result Value Ref Range    Magnesium 2.0 1.8 - 2.6 mg/dL   Potassium    Collection Time: 03/19/22  5:10 PM   Result Value Ref Range    Potassium 3.7 3.5 - 5.0 mmol/L   Basic metabolic panel    Collection Time: 03/20/22  5:31 AM   Result Value Ref Range    Sodium 134 (L) 136 - 145 mmol/L    Potassium 3.6 3.5 - 5.0 mmol/L    Chloride 93 (L) 98 - 107 mmol/L    Carbon Dioxide (CO2) 27 22 - 31 mmol/L    Anion Gap 14 5 - 18 mmol/L    Urea Nitrogen 10 8 - 22 mg/dL    Creatinine 0.88 0.60 - 1.10 mg/dL    Calcium 8.4 (L) 8.5 - 10.5 mg/dL    Glucose 78 70 - 125 mg/dL    GFR Estimate 73 >60 mL/min/1.73m2   CBC with platelets    Collection Time: 03/20/22  5:31 AM   Result Value Ref Range    WBC Count 7.6 4.0 - 11.0 10e3/uL    RBC Count 2.94 (L) 3.80 - 5.20 10e6/uL    Hemoglobin 9.8 (L) 11.7 - 15.7 g/dL    Hematocrit 29.3 (L) 35.0 - 47.0 %     78 - 100 fL    MCH 33.3 (H) 26.5 - 33.0 " pg    MCHC 33.4 31.5 - 36.5 g/dL    RDW 15.9 (H) 10.0 - 15.0 %    Platelet Count 137 (L) 150 - 450 10e3/uL   Magnesium    Collection Time: 03/20/22  5:31 AM   Result Value Ref Range    Magnesium 1.8 1.8 - 2.6 mg/dL

## 2022-03-20 NOTE — PLAN OF CARE
Problem: Fluid Volume Excess  Goal: Fluid Balance  Outcome: Ongoing, Progressing     Problem: Pain Chronic (Persistent)  Goal: Acceptable Pain Control and Functional Ability  Outcome: Ongoing, Progressing  Intervention: Develop Pain Management Plan  Recent Flowsheet Documentation  Taken 3/20/2022 0512 by Alysa Conklin, RN  Pain Management Interventions: medication (see MAR)  Taken 3/20/2022 0019 by Alysa Conklin, RN  Pain Management Interventions: declines     Goal Outcome Evaluation:        Patient stated that her edema is improved. Still getting IV lasix. New peripheral IV inserted. Complaint of abdominal pain, oxycodone given once. Showered tonight. Started on lactulose, no stool reported. Independent with cares.

## 2022-03-20 NOTE — PROGRESS NOTES
Care Management Follow Up    Length of Stay (days): 5    Expected Discharge Date: 03/21/2022 or 3/22/2022       Concerns to be Addressed:   IV Lasix every 8 hours.  Patient plan of care discussed at interdisciplinary rounds: Yes    Anticipated Discharge Disposition:  Home.     Anticipated Discharge Services:  None (follow up with outpatient mental health and AA meetings)  Anticipated Discharge DME:  Per therapy (if indicated). Patient is requesting a hospital bed for home use.     Patient/family educated on Medicare website which has current facility and service quality ratings:  NA  Education Provided on the Discharge Plan:  Per team  Patient/Family in Agreement with the Plan:  Yes     Referrals Placed by CM/SW:  None  Private pay costs discussed: Not applicable     Additional Information:  Patient lives with her sister and is independent at baseline. She has steps to get up to the bedroom and is requesting a hospital bed for home. Left a sticky note for MD but uncertain if she will qualify for coverage under her insurance. Explained that they could rent a bed from a DME company by using a credit card also (if her insurance denies coverage). She plans to follow up with outpatient mental health and psychiatry and AA meetings.     Leesa Abernathy RN

## 2022-03-21 ENCOUNTER — APPOINTMENT (OUTPATIENT)
Dept: ULTRASOUND IMAGING | Facility: HOSPITAL | Age: 62
DRG: 432 | End: 2022-03-21
Attending: INTERNAL MEDICINE
Payer: COMMERCIAL

## 2022-03-21 LAB
ALBUMIN SERPL-MCNC: 2.8 G/DL (ref 3.5–5)
ALBUMIN UR-MCNC: NEGATIVE MG/DL
ALP SERPL-CCNC: 117 U/L (ref 45–120)
ALT SERPL W P-5'-P-CCNC: 74 U/L (ref 0–45)
AMMONIA PLAS-SCNC: 50 UMOL/L (ref 11–35)
ANION GAP SERPL CALCULATED.3IONS-SCNC: 10 MMOL/L (ref 5–18)
APPEARANCE UR: CLEAR
AST SERPL W P-5'-P-CCNC: 234 U/L (ref 0–40)
BACTERIA FLD CULT: NO GROWTH
BILIRUB DIRECT SERPL-MCNC: 2.8 MG/DL
BILIRUB SERPL-MCNC: 9.4 MG/DL (ref 0–1)
BILIRUB UR QL STRIP: NEGATIVE
BUN SERPL-MCNC: 12 MG/DL (ref 8–22)
CALCIUM SERPL-MCNC: 8.7 MG/DL (ref 8.5–10.5)
CHLORIDE BLD-SCNC: 92 MMOL/L (ref 98–107)
CO2 SERPL-SCNC: 29 MMOL/L (ref 22–31)
COLOR UR AUTO: YELLOW
CREAT SERPL-MCNC: 0.84 MG/DL (ref 0.6–1.1)
ERYTHROCYTE [DISTWIDTH] IN BLOOD BY AUTOMATED COUNT: 15.9 % (ref 10–15)
FOLATE SERPL-MCNC: 6.3 NG/ML
GFR SERPL CREATININE-BSD FRML MDRD: 78 ML/MIN/1.73M2
GLUCOSE BLD-MCNC: 100 MG/DL (ref 70–125)
GLUCOSE UR STRIP-MCNC: NEGATIVE MG/DL
HCT VFR BLD AUTO: 29.3 % (ref 35–47)
HGB BLD-MCNC: 10 G/DL (ref 11.7–15.7)
HGB UR QL STRIP: NEGATIVE
HOLD SPECIMEN: NORMAL
INR PPP: 2.39 (ref 0.9–1.15)
KETONES UR STRIP-MCNC: NEGATIVE MG/DL
LEUKOCYTE ESTERASE UR QL STRIP: NEGATIVE
MAGNESIUM SERPL-MCNC: 1.8 MG/DL (ref 1.8–2.6)
MCH RBC QN AUTO: 33.4 PG (ref 26.5–33)
MCHC RBC AUTO-ENTMCNC: 34.1 G/DL (ref 31.5–36.5)
MCV RBC AUTO: 98 FL (ref 78–100)
NITRATE UR QL: NEGATIVE
PH UR STRIP: 5.5 [PH] (ref 5–7)
PLATELET # BLD AUTO: 144 10E3/UL (ref 150–450)
POTASSIUM BLD-SCNC: 3.8 MMOL/L (ref 3.5–5)
PROT SERPL-MCNC: 8.5 G/DL (ref 6–8)
RBC # BLD AUTO: 2.99 10E6/UL (ref 3.8–5.2)
SODIUM SERPL-SCNC: 131 MMOL/L (ref 136–145)
SP GR UR STRIP: 1.01 (ref 1–1.03)
UROBILINOGEN UR STRIP-MCNC: 4 MG/DL
VIT B12 SERPL-MCNC: 1688 PG/ML (ref 213–816)
WBC # BLD AUTO: 9.7 10E3/UL (ref 4–11)

## 2022-03-21 PROCEDURE — 80074 ACUTE HEPATITIS PANEL: CPT | Performed by: INTERNAL MEDICINE

## 2022-03-21 PROCEDURE — 82746 ASSAY OF FOLIC ACID SERUM: CPT | Performed by: INTERNAL MEDICINE

## 2022-03-21 PROCEDURE — 84425 ASSAY OF VITAMIN B-1: CPT | Performed by: INTERNAL MEDICINE

## 2022-03-21 PROCEDURE — 250N000013 HC RX MED GY IP 250 OP 250 PS 637: Performed by: HOSPITALIST

## 2022-03-21 PROCEDURE — 81003 URINALYSIS AUTO W/O SCOPE: CPT | Performed by: INTERNAL MEDICINE

## 2022-03-21 PROCEDURE — 85610 PROTHROMBIN TIME: CPT | Performed by: INTERNAL MEDICINE

## 2022-03-21 PROCEDURE — 83735 ASSAY OF MAGNESIUM: CPT | Performed by: INTERNAL MEDICINE

## 2022-03-21 PROCEDURE — 36415 COLL VENOUS BLD VENIPUNCTURE: CPT | Performed by: INTERNAL MEDICINE

## 2022-03-21 PROCEDURE — 250N000011 HC RX IP 250 OP 636: Performed by: INTERNAL MEDICINE

## 2022-03-21 PROCEDURE — 82374 ASSAY BLOOD CARBON DIOXIDE: CPT | Performed by: INTERNAL MEDICINE

## 2022-03-21 PROCEDURE — 87493 C DIFF AMPLIFIED PROBE: CPT | Performed by: INTERNAL MEDICINE

## 2022-03-21 PROCEDURE — 250N000013 HC RX MED GY IP 250 OP 250 PS 637: Performed by: INTERNAL MEDICINE

## 2022-03-21 PROCEDURE — 272N000706 US ABDOMEN LIMITED WITH DOPPLER COMPLETE

## 2022-03-21 PROCEDURE — 82140 ASSAY OF AMMONIA: CPT

## 2022-03-21 PROCEDURE — 36415 COLL VENOUS BLD VENIPUNCTURE: CPT

## 2022-03-21 PROCEDURE — 99233 SBSQ HOSP IP/OBS HIGH 50: CPT | Performed by: INTERNAL MEDICINE

## 2022-03-21 PROCEDURE — 82248 BILIRUBIN DIRECT: CPT | Performed by: INTERNAL MEDICINE

## 2022-03-21 PROCEDURE — 87040 BLOOD CULTURE FOR BACTERIA: CPT | Performed by: INTERNAL MEDICINE

## 2022-03-21 PROCEDURE — 258N000003 HC RX IP 258 OP 636: Performed by: INTERNAL MEDICINE

## 2022-03-21 PROCEDURE — 80307 DRUG TEST PRSMV CHEM ANLYZR: CPT | Performed by: INTERNAL MEDICINE

## 2022-03-21 PROCEDURE — 120N000004 HC R&B MS OVERFLOW

## 2022-03-21 PROCEDURE — 85027 COMPLETE CBC AUTOMATED: CPT | Performed by: INTERNAL MEDICINE

## 2022-03-21 PROCEDURE — 82607 VITAMIN B-12: CPT | Performed by: INTERNAL MEDICINE

## 2022-03-21 RX ORDER — POTASSIUM CHLORIDE 1500 MG/1
20 TABLET, EXTENDED RELEASE ORAL ONCE
Status: COMPLETED | OUTPATIENT
Start: 2022-03-21 | End: 2022-03-21

## 2022-03-21 RX ORDER — DIPHENHYDRAMINE HCL 25 MG
25 CAPSULE ORAL EVERY 6 HOURS PRN
Status: DISCONTINUED | OUTPATIENT
Start: 2022-03-21 | End: 2022-03-22

## 2022-03-21 RX ORDER — FUROSEMIDE 10 MG/ML
40 INJECTION INTRAMUSCULAR; INTRAVENOUS EVERY 8 HOURS
Status: DISCONTINUED | OUTPATIENT
Start: 2022-03-21 | End: 2022-03-24

## 2022-03-21 RX ADMIN — FUROSEMIDE 40 MG: 10 INJECTION, SOLUTION INTRAMUSCULAR; INTRAVENOUS at 21:34

## 2022-03-21 RX ADMIN — OXYCODONE HYDROCHLORIDE 5 MG: 5 TABLET ORAL at 03:23

## 2022-03-21 RX ADMIN — LACTULOSE 20 G: 10 SOLUTION ORAL at 13:22

## 2022-03-21 RX ADMIN — SPIRONOLACTONE 100 MG: 25 TABLET, FILM COATED ORAL at 08:55

## 2022-03-21 RX ADMIN — RIFAXIMIN 550 MG: 550 TABLET ORAL at 11:17

## 2022-03-21 RX ADMIN — RIFAXIMIN 550 MG: 550 TABLET ORAL at 21:34

## 2022-03-21 RX ADMIN — LACTULOSE 20 G: 10 SOLUTION ORAL at 21:34

## 2022-03-21 RX ADMIN — PHYTONADIONE 5 MG: 10 INJECTION, EMULSION INTRAMUSCULAR; INTRAVENOUS; SUBCUTANEOUS at 22:14

## 2022-03-21 RX ADMIN — POTASSIUM CHLORIDE 20 MEQ: 1500 TABLET, EXTENDED RELEASE ORAL at 08:54

## 2022-03-21 RX ADMIN — FUROSEMIDE 60 MG: 10 INJECTION, SOLUTION INTRAMUSCULAR; INTRAVENOUS at 14:51

## 2022-03-21 RX ADMIN — FUROSEMIDE 60 MG: 10 INJECTION, SOLUTION INTRAMUSCULAR; INTRAVENOUS at 06:18

## 2022-03-21 RX ADMIN — LACTULOSE 20 G: 10 SOLUTION ORAL at 06:23

## 2022-03-21 RX ADMIN — SERTRALINE HYDROCHLORIDE 50 MG: 50 TABLET ORAL at 08:54

## 2022-03-21 RX ADMIN — DIPHENHYDRAMINE HCL 25 MG: 25 CAPSULE ORAL at 16:36

## 2022-03-21 ASSESSMENT — ACTIVITIES OF DAILY LIVING (ADL)
ADLS_ACUITY_SCORE: 8

## 2022-03-21 NOTE — PROGRESS NOTES
[facility] Internal Medicine Progress Note      ASSESSMENT:    Principal Problem:    Anasarca  Active Problems:    Alcoholic cirrhosis of liver with ascites (H)    Chronic hepatitis C virus infection (H)      PLAN:   61 year old female with history of liver cirrhosis presents with abdominal pain and distension, leg edema and weight gain.         Metabolic encephalopathy: Concerning for hepatic enephalopathy, possible contribution of narcotics  Noted to have elevated NH3 as part of asterixis workup 3/20/22. Later in the evening noted to be more confused and resltess. Workup by resident notable for persistently elevated ammonia level despite lactulose. Scheduled Lactulose noted to be held evening of 3/20/22 due to multiple stools  -- continue lactulose given persistently elevated ammonia levels  -- hold zoloft for now, give low dose benadryl for agitation and skin irritation from elevated bili  -- add rifaximin  -- check UA although WBC not elevated  -- Hgb stable so less concern for any GI bleeding  -- check LFt's, INR, B12, B1 and folate levels  -- abdominal US 3/21 negative for tappable ascites or any portal venous thrombosis  -- consult pain team for alternative options for chronic pain besides narcotics, try to limit narcotics as able as inpatient        Alcoholic liver cirrhosis with ascites: History of hepatitis C treated last year, last alcohol use October 2021  CT on admission showed hepatic cirrhosis and steatosis, portal hypertension including large volume ascites, mild splenic enlargement and small caliber upper abdominal varices.  Status post diagnostic and therapeutic paracentesis 3/15/22, 3.7 L removed, no concern for SBP  -- Continue aggressive IV furosemide and spironolactone as renal function is tolerating this well. --Given initiation of lactulose will decrease IV lasix dosing from 60mg IV q8 to 40mg IV q8 slightly to prevent large net negative volume shifts  - EGD on 3/30 as scheduled with [facility]  "outpatient, [facility] Hepatology follow up 5/12 in [place] as scheduled  - plan additional management and workup of encephalopathy as above      Hypomagnesemia: Replaced      Mild hyponatremia: monitor, continue loop diuresis, not likely contributing to encephalopathy       Bilateral leg swelling: US shows no DVT.   - continue aggressive IV iuresis as above, monitor inpatient labs       Coagulopathy: due to liver cirrhosis. No signs of bleeding. Monitor. Vitamin K as indicated       Chronic anemia: Hemoglobin 10.0 3/21, stable. No signs of acute blood loss  - monitor for any clinical signs of blood loss       Chronic thrombocytopenia: due to liver cirrhosis.   -- monitor as outpatient       Tobacco use disorder: Encouraged complete cessation       Obesity: BMI 39.26           Diet: Combination Diet Low Saturated Fat Na <2400mg Diet   DVT Prophylaxis: Ambulate. Anticoagulation contraindicated due to coagulopathy. No SCD due to bilateral leg swelling  Code Status: Full Code          Disposition Plan     Expected Discharge: 03/23/2022     Anticipated discharge location: [facility]            Jere Ochoa D.O.              -------------------------------------------------------------------------------------------------------------  SUBJECTIVE: Anxious, agitated, complains of increased confusion but knows place and time. Abdominal pain ongiong, leg pain ongoing. Denies any nausea, vomiting, chest pain, SOB, fevers, chills, or headache.   LE edema improving       Exam:  /58 (BP Location: Left arm)   Pulse 83   Temp 98.3  F (36.8  C) (Oral)   Resp 18   Ht 1.651 m (5' 5\")   Wt 97.8 kg (215 lb 11.2 oz)   SpO2 96%   BMI 35.89 kg/m    General: Mild acute distress  RESPIRATORY: Breathing labored  CARDIOVASCULAR: 2+ le edema bilat.   ABDOMEN:s, nt, slightly distended  NEUROLOGIC: Motor grossly intact, speech clear, tremulous but no classic asterixis on my exam       Diagnostics Reviewed:      Recent Results (from the " past 24 hour(s))   Basic metabolic panel    Collection Time: 03/21/22  5:36 AM   Result Value Ref Range    Sodium 131 (L) 136 - 145 mmol/L    Potassium 3.8 3.5 - 5.0 mmol/L    Chloride 92 (L) 98 - 107 mmol/L    Carbon Dioxide (CO2) 29 22 - 31 mmol/L    Anion Gap 10 5 - 18 mmol/L    Urea Nitrogen 12 8 - 22 mg/dL    Creatinine 0.84 0.60 - 1.10 mg/dL    Calcium 8.7 8.5 - 10.5 mg/dL    Glucose 100 70 - 125 mg/dL    GFR Estimate 78 >60 mL/min/1.73m2   Magnesium    Collection Time: 03/21/22  5:36 AM   Result Value Ref Range    Magnesium 1.8 1.8 - 2.6 mg/dL   Ammonia    Collection Time: 03/21/22  7:10 AM   Result Value Ref Range    Ammonia 50 (H) 11 - 35 umol/L

## 2022-03-21 NOTE — PLAN OF CARE
Problem: Fluid Volume Excess  Goal: Fluid Balance  Outcome: Ongoing, Progressing     Problem: Pain Chronic (Persistent)  Goal: Acceptable Pain Control and Functional Ability  Outcome: Ongoing, Progressing  Intervention: Develop Pain Management Plan  Recent Flowsheet Documentation  Taken 3/21/2022 0830 by Meg Fierro RN  Pain Management Interventions: medication (see MAR)  Intervention: Manage Persistent Pain  Recent Flowsheet Documentation  Taken 3/21/2022 0830 by Meg Fierro RN  Medication Review/Management: medications reviewed   Goal Outcome Evaluation:          Patient alert and oriented yet said she feels foggy and weak. She said she is not ready to go home. Ammonia level 50 and went for testing now US of Hepatic and Portal Vascular Cmpl. Sister Ashley bedside and supportive of the plan. Patient UA sent and negative results. She did not request anything for pain in her back. No c/o pain. Multiple times she had brown loose stools. Patient on lactulose. Continue to monitor lab results. Will give the lasix when she gets back from the tests.

## 2022-03-21 NOTE — PROGRESS NOTES
"GI PROGRESS NOTE  3/21/2022  Marj BARCENAS Bryon  1960  /-62    Subjective:   Reports abdominal pain, leg and back pain. Feels more alert than this morning, but is more confused per family today than yesterday. She had 4 stools yesterday, 1 stool thus far today has been documented.      Objective:     Blood pressure 116/50, pulse 82, temperature 98.3  F (36.8  C), temperature source Oral, resp. rate 20, height 1.651 m (5' 5\"), weight 97.8 kg (215 lb 11.2 oz), SpO2 97 %.    Body mass index is 35.89 kg/m .  Gen: NAD  Cardio: RRR  GI: Non-distended, BS positive, soft, diffuse tenderness throughout  Neuro: Alert, orientated x3 but appears restless. No asterixis.     Laboratory:  BMP  Recent Labs   Lab 03/21/22  0536 03/20/22  0531 03/19/22  1710 03/19/22  0555   * 134*  --  133*   POTASSIUM 3.8 3.6 3.7 4.0   CHLORIDE 92* 93*  --  98   DAWIT 8.7 8.4*  --  8.3*   CO2 29 27  --  28   BUN 12 10  --  11   CR 0.84 0.88  --  0.77    78  --  93     CBC  Recent Labs   Lab 03/21/22  0830 03/20/22  0531 03/18/22  0558 03/16/22  0623   WBC 9.7 7.6  --  6.3   RBC 2.99* 2.94*  --  2.58*   HGB 10.0* 9.8* 9.6* 8.8*   HCT 29.3* 29.3*  --  26.0*   MCV 98 100  --  101*   MCH 33.4* 33.3*  --  34.1*   MCHC 34.1 33.4  --  33.8   RDW 15.9* 15.9*  --  16.5*   * 137*  --  92*     INR  Recent Labs   Lab 03/21/22  0942 03/16/22  0623 03/15/22  1528   INR 2.39* 2.66* 2.47*      LFTs  Recent Labs   Lab Test 03/21/22  1327 03/21/22  0536 03/16/22  0623 03/15/22  1820 03/15/22  1712 03/15/22  1623   ALBUMIN  --  2.8* 1.9* 2.1* 2.3*  --    BILITOTAL  --  9.4* 5.0*  --  6.0*  --    ALT  --  74* 21  --  31  --    AST  --  234* 38  --  44*  --    PROTEIN Negative  --   --   --   --  30 *     Imaging:  [place] US PENDING    1. PARACENTESIS  2. ULTRASOUND GUIDANCE  LOCATION: [facility]  DATE/TIME: 3/15/2022 8:37 PM     INDICATION: Ascites.     PROCEDURE: Informed consent obtained. Time out performed. The " abdomen was prepped and draped in a sterile fashion. 10 mL of 1% lidocaine was infused into local soft tissues. A 5 Estonian catheter system was introduced into the abdominal ascites under   ultrasound guidance.     3.7 liters of clear fluid were removed and sent to the lab.     Patient tolerated procedure well.     Ultrasound imaging was obtained and placed in the patient's permanent medical record.                                                                      IMPRESSION:  1.  Status post ultrasound-guided paracentesis.    EXAM: CT ABDOMEN PELVIS W CONTRAST  LOCATION: [facility]  DATE/TIME: 3/15/2022 6:26 PM     INDICATION: Abdominal distention.  COMPARISON: None.  TECHNIQUE: CT scan of the abdomen and pelvis was performed following injection of IV contrast. Multiplanar reformats were obtained. Dose reduction techniques were used.  CONTRAST: Isovue 370, 100 mL.     FINDINGS:   LOWER CHEST: Visualized lungs are clear. No pleural effusion. Heart size normal with no pericardial effusion.      HEPATOBILIARY: Hepatic cirrhosis and steatosis. No liver mass. No bile duct dilatation. A calcified stone in the otherwise normal appearing gallbladder.     PANCREAS: Normal.     SPLEEN: Spleen is enlarged at 14 cm.     ADRENAL GLANDS: Normal.     KIDNEY/BLADDER: Nonobstructing 2 mm right renal stone.  Kidneys, ureters and bladder are otherwise normal.     BOWEL: Large volume ascites. Colonic diverticulosis. Bowel is otherwise normal with no obstruction or inflammatory change.     LYMPH NODES: No lymphadenopathy.     VASCULATURE: Normal caliber abdominal aorta.  Main portal vein patent. Small caliber upper abdominal varices.      PELVIC ORGANS: No pelvic mass or fluid.     MUSCULOSKELETAL: Unremarkable.                                                                      IMPRESSION:   1.  Hepatic cirrhosis and steatosis portal hypertension including large volume ascites, mild splenic enlargement and  small caliber upper abdominal varices.  2.  Cholelithiasis.     Assessment:   61 year-old female with h/o Hepatitis C s/p treatment, cirrhosis, alcohol abuse admitted with LE edema. Now she is encephalopathic with rising LFTs.     1. Elevated LFTs  2. Encephalopathy  Ammonia elevated. Now on lactulose and rifaximin. Was on narcotics from pain control, but now these have been held. UA normal. No ascites on US. Previous tap negative for SBP. WBC normal. Folate, Vit B12 normal. Consider portal vein thrombosis, choledocholithiasis. We are awaiting results from duplex US.     3. Cirrhosis with ascites  MELD 28. Undergoing diuresis for anasarca. Sober since 2021. Needs liver clinic follow-up and screening EGD (twin scheduled 3/30/22).      Plan:     1. Await Duplex US  2. Trend LFTs  3. Agree with pain team consult  4. Continue lactulose and rifaximin  5. Outpatient liver clinic follow-up  6 . EGD currently scheduled 3/30/22                                                                         Faviola Leyva PA-C  Thank you for the opportunity to participate in the care of this patient.   Please feel free to call me with any questions or concerns.  Phone number [phone].

## 2022-03-21 NOTE — PLAN OF CARE
Goal Outcome Evaluation:          Problem: Pain Chronic (Persistent)  Goal: Acceptable Pain Control and Functional Ability  Outcome: Ongoing, Progressing  Intervention: Develop Pain Management Plan  Recent Flowsheet Documentation  Taken 3/21/2022 0323 by Lissy Becerra RN  Pain Management Interventions: medication (see MAR)  Taken 3/21/2022 0020 by Lissy Becerra RN  Pain Management Interventions: medication offered but refused  Intervention: Manage Persistent Pain  Recent Flowsheet Documentation  Taken 3/21/2022 0030 by Lissy Becerra RN  Medication Review/Management: medications reviewed        Patient was acutely confused to time, and situation at 0540.  Patient knows she is confused and is anxious.  Vital signs stable.  MD updated and here assessing patient.  Pain medication given at 0330 for abdominal cramping.  Pain was improved after oxycodone given.  Patient reported some nausea earlier in shift, but declined medication.  Jello given as snack. Patient sitting up in recliner with chair alarm on while she is not feeling like herself.       Problem: Fluid Volume Excess  Goal: Fluid Balance  Outcome: Ongoing, Progressing     Patient was instructed to allow staff to measure output.  Edema in legs present but improved.

## 2022-03-21 NOTE — PLAN OF CARE
Problem: Plan of Care - These are the overarching goals to be used throughout the patient stay.    Goal: Absence of Hospital-Acquired Illness or Injury  Intervention: Identify and Manage Fall Risk  Recent Flowsheet Documentation  Taken 3/21/2022 1600 by Allyson Rivero RN  Safety Promotion/Fall Prevention:    activity supervised    bed alarm on    fall prevention program maintained    room near nurse's station    room door open    safety round/check completed     Problem: Plan of Care - These are the overarching goals to be used throughout the patient stay.    Goal: Absence of Hospital-Acquired Illness or Injury  Intervention: Prevent Skin Injury  Recent Flowsheet Documentation  Taken 3/21/2022 1600 by Allyson Rivero RN  Body Position: position changed independently     Problem: Plan of Care - These are the overarching goals to be used throughout the patient stay.    Goal: Absence of Hospital-Acquired Illness or Injury  Intervention: Prevent and Manage VTE (Venous Thromboembolism) Risk  Recent Flowsheet Documentation  Taken 3/21/2022 1600 by Allyson Rivero RN  Activity Management:    activity adjusted per tolerance    up ad dustin     Problem: Plan of Care - These are the overarching goals to be used throughout the patient stay.    Goal: Optimal Comfort and Wellbeing  Outcome: Ongoing, Progressing     Problem: Fluid Volume Excess  Goal: Fluid Balance  Outcome: Ongoing, Progressing     Problem: Pain Chronic (Persistent)  Goal: Acceptable Pain Control and Functional Ability  Intervention: Develop Pain Management Plan  Recent Flowsheet Documentation  Taken 3/21/2022 1600 by Allyson Rivero RN  Pain Management Interventions: distraction     Problem: Electrolyte Imbalance  Goal: Electrolyte Balance  Outcome: Ongoing, Progressing   Goal Outcome Evaluation:  Pt is alert and oriented x 4, but feels kind of foggy. She is restless in the room, Benadryl 25 mg po given as ordered for anxiety,  minimal effect per pt's report. Lung sounds clear, diminished, on RA. Has SOB with activity. On IV lasix for diuresing, voiding well. Pt 's sister is asking for an air mattress, she noticed new bruising in the back and behind the knee and is wondering if pt can have the specialty mattress for skin protection. Dr. Ochoa informed, no new order for the bed. C-diff stool specimen sent at 2315 H. One dose of Phytonadione 5 mg IV given. Will continue to monitor.

## 2022-03-21 NOTE — SIGNIFICANT EVENT
"Significant Event Note    Time of event: 6:34 AM March 21, 2022    Description of event:  Paged concerning confusion.    When getting AM labs, patient noted to be more confused.  I went to evaluate the patient:  /73 (BP Location: Right arm)   Pulse 93   Temp 98.3  F (36.8  C) (Oral)   Resp 20   Ht 1.651 m (5' 5\")   Wt 97.8 kg (215 lb 11.2 oz)   SpO2 95%   BMI 35.89 kg/m      She appears anxious and restless. She is aware that she is confused; however, she is AOx4. Able to follow commands. Asterixis noted on exam, otherwise nonfocal. She did receive a dose of oxycodone earlier.    Plan:  Suspect related to oxycodone and/or worsening hepatic encephalopathy.   - morning labs now plus ammonia  - give lactulose now  - monitor closely for worsening confusion    Discussed with: bedside nurse    Karla Serna MD    "

## 2022-03-21 NOTE — PLAN OF CARE
Problem: Plan of Care - These are the overarching goals to be used throughout the patient stay.    Goal: Plan of Care Review/Shift Note  Description: The Plan of Care Review/Shift note should be completed every shift.  The Outcome Evaluation is a brief statement about your assessment that the patient is improving, declining, or no change.  This information will be displayed automatically on your shift note.  Outcome: Ongoing, Progressing  Flowsheets (Taken 3/20/2022 2200)  Plan of Care Reviewed With:   patient   family     Problem: Fall Injury Risk  Goal: Absence of Fall and Fall-Related Injury  Outcome: Ongoing, Progressing  Intervention: Identify and Manage Contributors  Recent Flowsheet Documentation  Taken 3/20/2022 1540 by Nancy Paulino RN  Medication Review/Management: medications reviewed  Intervention: Promote Injury-Free Environment  Recent Flowsheet Documentation  Taken 3/20/2022 1540 by Nancy Paulino RN  Safety Promotion/Fall Prevention:   nonskid shoes/slippers when out of bed   patient and family education   clutter free environment maintained   fall prevention program maintained     Problem: Pain Chronic (Persistent)  Goal: Acceptable Pain Control and Functional Ability  Outcome: Ongoing, Progressing  Intervention: Manage Persistent Pain  Recent Flowsheet Documentation  Taken 3/20/2022 1540 by Nancy Paulino RN  Medication Review/Management: medications reviewed     Problem: Electrolyte Imbalance  Goal: Electrolyte Balance  Outcome: Ongoing, Progressing   Goal Outcome Evaluation:    Plan of Care Reviewed With: patient, family   Anna had a good shift. Lactulose held due to multiple stools this shift but overall she says her stomach feels better after BM. Does still have tremor and responsiveness is slow, sclera discolored. Oxy 5mg given x 1 for leg pain, effective. Eating well and up independently in her room. Adequate intake and output

## 2022-03-21 NOTE — PROGRESS NOTES
Care Management Follow Up    Length of Stay (days): 6    Expected Discharge Date: 03/22/2022     Concerns to be Addressed:     Discharge planning and disposition  Patient plan of care discussed at interdisciplinary rounds: Yes    Anticipated Discharge Disposition: Home     Anticipated Discharge Services:  To be determined  Anticipated Discharge DME:  To be determined    Patient/family educated on Medicare website which has current facility and service quality ratings:    Education Provided on the Discharge Plan:  yes  Patient/Family in Agreement with the Plan:  yes    Referrals Placed by CM/SW:  Not at this time  Private pay costs discussed: Not applicable    Additional Information:  Chart reviewed.   SW met with patient and her sister Ashley in patient's room to introduce self, CM role and review discharge, planning offer support/resources.  Patient has lived with her sister Ashley for 15 years, Ashley is a retired nurse and pt gives  permission to continue to coordinate discharge planning with sister.  Patient has active Medical Assistance with her [employer] [place] plan. Per sister, pt is not otherwise receiving any income. Provided sister brief information on Medicare/Medicaid, PCA services. Pt is not certified disabled, therefore would not qualify for waiver programs at this time. Sister states she is exploring different options and wants to be prepared for pt should she come home, also feels patient may need a transitional care unit, explained will need PT/OT consults for recommendations.   SW will provide resources on application for general assistance.   Care management following.      Bonnie Mann, LELOSW

## 2022-03-22 ENCOUNTER — APPOINTMENT (OUTPATIENT)
Dept: OCCUPATIONAL THERAPY | Facility: HOSPITAL | Age: 62
DRG: 432 | End: 2022-03-22
Attending: INTERNAL MEDICINE
Payer: COMMERCIAL

## 2022-03-22 ENCOUNTER — APPOINTMENT (OUTPATIENT)
Dept: CT IMAGING | Facility: HOSPITAL | Age: 62
DRG: 432 | End: 2022-03-22
Attending: INTERNAL MEDICINE
Payer: COMMERCIAL

## 2022-03-22 LAB
ALBUMIN SERPL-MCNC: 2.4 G/DL (ref 3.5–5)
ALP SERPL-CCNC: 100 U/L (ref 45–120)
ALT SERPL W P-5'-P-CCNC: 72 U/L (ref 0–45)
AMMONIA PLAS-SCNC: 41 UMOL/L (ref 11–35)
ANION GAP SERPL CALCULATED.3IONS-SCNC: 8 MMOL/L (ref 5–18)
APAP SERPL-MCNC: <3 UG/ML (ref 10–20)
AST SERPL W P-5'-P-CCNC: 214 U/L (ref 0–40)
BILIRUB DIRECT SERPL-MCNC: 2.8 MG/DL
BILIRUB SERPL-MCNC: 9.1 MG/DL (ref 0–1)
BUN SERPL-MCNC: 11 MG/DL (ref 8–22)
C DIFF TOX B STL QL: NEGATIVE
CALCIUM SERPL-MCNC: 8 MG/DL (ref 8.5–10.5)
CHLORIDE BLD-SCNC: 93 MMOL/L (ref 98–107)
CO2 SERPL-SCNC: 29 MMOL/L (ref 22–31)
CREAT SERPL-MCNC: 0.83 MG/DL (ref 0.6–1.1)
GFR SERPL CREATININE-BSD FRML MDRD: 79 ML/MIN/1.73M2
GLUCOSE BLD-MCNC: 98 MG/DL (ref 70–125)
HOLD SPECIMEN: NORMAL
INR PPP: 2.96 (ref 0.9–1.15)
MAGNESIUM SERPL-MCNC: 1.7 MG/DL (ref 1.8–2.6)
POTASSIUM BLD-SCNC: 3.8 MMOL/L (ref 3.5–5)
PROT SERPL-MCNC: 7 G/DL (ref 6–8)
SODIUM SERPL-SCNC: 130 MMOL/L (ref 136–145)

## 2022-03-22 PROCEDURE — 99233 SBSQ HOSP IP/OBS HIGH 50: CPT | Performed by: INTERNAL MEDICINE

## 2022-03-22 PROCEDURE — 250N000013 HC RX MED GY IP 250 OP 250 PS 637: Performed by: INTERNAL MEDICINE

## 2022-03-22 PROCEDURE — 99223 1ST HOSP IP/OBS HIGH 75: CPT | Performed by: NURSE PRACTITIONER

## 2022-03-22 PROCEDURE — 120N000001 HC R&B MED SURG/OB

## 2022-03-22 PROCEDURE — 80053 COMPREHEN METABOLIC PANEL: CPT | Performed by: INTERNAL MEDICINE

## 2022-03-22 PROCEDURE — 97535 SELF CARE MNGMENT TRAINING: CPT | Mod: GO

## 2022-03-22 PROCEDURE — 250N000013 HC RX MED GY IP 250 OP 250 PS 637: Performed by: HOSPITALIST

## 2022-03-22 PROCEDURE — 70450 CT HEAD/BRAIN W/O DYE: CPT

## 2022-03-22 PROCEDURE — 250N000011 HC RX IP 250 OP 636: Performed by: INTERNAL MEDICINE

## 2022-03-22 PROCEDURE — 250N000013 HC RX MED GY IP 250 OP 250 PS 637: Performed by: PHYSICIAN ASSISTANT

## 2022-03-22 PROCEDURE — 82248 BILIRUBIN DIRECT: CPT | Performed by: INTERNAL MEDICINE

## 2022-03-22 PROCEDURE — 80143 DRUG ASSAY ACETAMINOPHEN: CPT | Performed by: INTERNAL MEDICINE

## 2022-03-22 PROCEDURE — 80321 ALCOHOLS BIOMARKERS 1OR 2: CPT | Performed by: INTERNAL MEDICINE

## 2022-03-22 PROCEDURE — 83735 ASSAY OF MAGNESIUM: CPT | Performed by: INTERNAL MEDICINE

## 2022-03-22 PROCEDURE — 82140 ASSAY OF AMMONIA: CPT | Performed by: INTERNAL MEDICINE

## 2022-03-22 PROCEDURE — 85610 PROTHROMBIN TIME: CPT | Performed by: INTERNAL MEDICINE

## 2022-03-22 PROCEDURE — 36415 COLL VENOUS BLD VENIPUNCTURE: CPT | Performed by: INTERNAL MEDICINE

## 2022-03-22 PROCEDURE — 87799 DETECT AGENT NOS DNA QUANT: CPT | Performed by: INTERNAL MEDICINE

## 2022-03-22 PROCEDURE — 250N000009 HC RX 250: Performed by: NURSE PRACTITIONER

## 2022-03-22 PROCEDURE — 97166 OT EVAL MOD COMPLEX 45 MIN: CPT | Mod: GO

## 2022-03-22 RX ORDER — DIPHENHYDRAMINE HYDROCHLORIDE AND LIDOCAINE HYDROCHLORIDE AND ALUMINUM HYDROXIDE AND MAGNESIUM HYDRO
10 KIT
Status: DISCONTINUED | OUTPATIENT
Start: 2022-03-22 | End: 2022-03-22

## 2022-03-22 RX ORDER — DIPHENHYDRAMINE HYDROCHLORIDE AND LIDOCAINE HYDROCHLORIDE AND ALUMINUM HYDROXIDE AND MAGNESIUM HYDRO
10 KIT EVERY 6 HOURS PRN
Status: DISCONTINUED | OUTPATIENT
Start: 2022-03-22 | End: 2022-03-22

## 2022-03-22 RX ORDER — LIDOCAINE 50 MG/G
OINTMENT TOPICAL 3 TIMES DAILY PRN
Status: DISCONTINUED | OUTPATIENT
Start: 2022-03-22 | End: 2022-03-28 | Stop reason: HOSPADM

## 2022-03-22 RX ORDER — OXYCODONE HYDROCHLORIDE 5 MG/1
5 TABLET ORAL 3 TIMES DAILY PRN
Status: DISCONTINUED | OUTPATIENT
Start: 2022-03-22 | End: 2022-03-23

## 2022-03-22 RX ORDER — LACTULOSE 10 G/15ML
20 SOLUTION ORAL 2 TIMES DAILY
Status: DISCONTINUED | OUTPATIENT
Start: 2022-03-22 | End: 2022-03-25

## 2022-03-22 RX ORDER — DIPHENHYDRAMINE HCL 25 MG
25 CAPSULE ORAL EVERY 6 HOURS PRN
Status: DISCONTINUED | OUTPATIENT
Start: 2022-03-22 | End: 2022-03-22

## 2022-03-22 RX ORDER — LIDOCAINE HYDROCHLORIDE 20 MG/ML
5 SOLUTION OROPHARYNGEAL 4 TIMES DAILY
Status: DISCONTINUED | OUTPATIENT
Start: 2022-03-22 | End: 2022-03-28 | Stop reason: HOSPADM

## 2022-03-22 RX ORDER — DIPHENHYDRAMINE HCL 25 MG
25 CAPSULE ORAL EVERY 6 HOURS PRN
Status: DISCONTINUED | OUTPATIENT
Start: 2022-03-22 | End: 2022-03-25

## 2022-03-22 RX ORDER — HYDROXYZINE HYDROCHLORIDE 10 MG/1
10 TABLET, FILM COATED ORAL EVERY 6 HOURS PRN
Status: DISCONTINUED | OUTPATIENT
Start: 2022-03-22 | End: 2022-03-22

## 2022-03-22 RX ADMIN — FUROSEMIDE 40 MG: 10 INJECTION, SOLUTION INTRAMUSCULAR; INTRAVENOUS at 12:07

## 2022-03-22 RX ADMIN — LIDOCAINE HYDROCHLORIDE 5 ML: 20 SOLUTION ORAL; TOPICAL at 12:07

## 2022-03-22 RX ADMIN — OXYCODONE HYDROCHLORIDE 5 MG: 5 TABLET ORAL at 06:26

## 2022-03-22 RX ADMIN — DIPHENHYDRAMINE HCL 25 MG: 25 CAPSULE ORAL at 22:37

## 2022-03-22 RX ADMIN — RIFAXIMIN 550 MG: 550 TABLET ORAL at 08:13

## 2022-03-22 RX ADMIN — LIDOCAINE HYDROCHLORIDE 5 ML: 20 SOLUTION ORAL; TOPICAL at 16:47

## 2022-03-22 RX ADMIN — POLYETHYLENE GLYCOL 3350 17 G: 17 POWDER, FOR SOLUTION ORAL at 08:13

## 2022-03-22 RX ADMIN — LACTULOSE 20 G: 10 SOLUTION ORAL at 08:12

## 2022-03-22 RX ADMIN — DIPHENHYDRAMINE HCL 25 MG: 25 CAPSULE ORAL at 00:37

## 2022-03-22 RX ADMIN — SPIRONOLACTONE 100 MG: 25 TABLET, FILM COATED ORAL at 08:13

## 2022-03-22 RX ADMIN — RIFAXIMIN 550 MG: 550 TABLET ORAL at 20:31

## 2022-03-22 RX ADMIN — FUROSEMIDE 40 MG: 10 INJECTION, SOLUTION INTRAMUSCULAR; INTRAVENOUS at 20:40

## 2022-03-22 RX ADMIN — FUROSEMIDE 40 MG: 10 INJECTION, SOLUTION INTRAMUSCULAR; INTRAVENOUS at 06:18

## 2022-03-22 RX ADMIN — ACETAMINOPHEN 325 MG: 325 TABLET ORAL at 08:12

## 2022-03-22 RX ADMIN — MELATONIN TAB 3 MG 3 MG: 3 TAB at 00:37

## 2022-03-22 RX ADMIN — OXYCODONE HYDROCHLORIDE 5 MG: 5 TABLET ORAL at 00:37

## 2022-03-22 RX ADMIN — LIDOCAINE HYDROCHLORIDE 5 ML: 20 SOLUTION ORAL; TOPICAL at 20:33

## 2022-03-22 ASSESSMENT — ACTIVITIES OF DAILY LIVING (ADL)
ADLS_ACUITY_SCORE: 8
ADLS_ACUITY_SCORE: 10
ADLS_ACUITY_SCORE: 8
ADLS_ACUITY_SCORE: 10
ADLS_ACUITY_SCORE: 10
ADLS_ACUITY_SCORE: 8

## 2022-03-22 NOTE — PLAN OF CARE
Goal Outcome Evaluation:  Problem: Fluid Volume Excess  Goal: Fluid Balance  3/22/2022 1241 by Meg Fierro RN  Outcome: Ongoing, Progressing  3/22/2022 1240 by Meg Fierro RN  Outcome: Ongoing, Progressing     Problem: Pain Chronic (Persistent)  Goal: Acceptable Pain Control and Functional Ability  3/22/2022 1241 by Meg Fierro RN  Outcome: Ongoing, Progressing  3/22/2022 1240 by Meg Fierro RN  Outcome: Ongoing, Progressing  Intervention: Develop Pain Management Plan  Recent Flowsheet Documentation  Taken 3/22/2022 1208 by Meg Fierro RN  Pain Management Interventions: (lidocaine S and S) other (see comments)  Taken 3/22/2022 0812 by Meg Fierro RN  Pain Management Interventions:   medication (see MAR)   distraction  Intervention: Manage Persistent Pain  Recent Flowsheet Documentation  Taken 3/22/2022 0900 by Meg Fierro RN  Medication Review/Management: medications reviewed     Patient alert and oriented x 3 gets forgetful about date. Patient seems more alert today. She does have pain on left side of tongue and Xylocaine 2% 5 ml S & S given. Static Air Mattress on her bed. Patient is resting comfortably and will order lunch. Not as much leg of back pain rates it 5 out of 10. Lymphedema therapy and wraps on her legs to help with swelling. Need urine sample for test. Mostly had flatus today so far. Night shift she had loose stools. Cdiff negative. Blood cultures pending. GI came to see patient, see note. Ammonia level 41 today, improved from yesterday. Ashley, sister, was here and very supportive. She thanked us for caring for her sister.  Call light in reach.

## 2022-03-22 NOTE — PROGRESS NOTES
[facility]    PROGRESS NOTE - Hospitalist Service    ASSESSMENT AND PLAN     Principal Problem:    Anasarca  Active Problems:    Alcoholic cirrhosis of liver with ascites (H)    Chronic hepatitis C virus infection (H)    Marj Slater is a 61 year old female with history of alcoholic liver cirrhosis, hepatitis C status post treatment who presents with abdominal pain and distension, leg edema and weight gain.      Acute metabolic encephalopathy: Concerning for hepatic encephalopathy + possible contribution of narcotics   Noted to have elevated NH3 as part of asterixis workup 3/20/22. Later in the evening noted to be more confused and restless. Workup by resident notable for persistently elevated ammonia level despite lactulose. Scheduled Lactulose noted to be held evening of 3/20/22 due to multiple stools   Continue lactulose and titrate for 2-3 bowel movements per day.  Discussed with nursing.     Holding zoloft for now   Low dose benadryl for agitation and skin irritation from elevated bili   Rifaximin added    Abdominal US 3/21 negative for tappable ascites or any portal venous thrombosis   Follow up pain team consult for alternative options for chronic pain besides narcotics, try to limit narcotics as able as inpatient      Alcoholic liver cirrhosis with ascites   History of hepatitis C treated last year, last alcohol use October 31, 2021   CT on admission reviewed with hepatic cirrhosis and steatosis, portal hypertension including large volume ascites, mild splenic enlargement and small caliber upper abdominal varices.   Status post diagnostic and therapeutic paracentesis 3/15/22, 3.7 L removed, no concern for SBP   Continue aggressive IV furosemide and spironolactone as renal function is tolerating this well.    EGD scheduled for 3/30 with [facility] outpatient, [facility] Hepatology follow up 5/12 in [place] as scheduled   Follow-up gastroenterology plan for discharge.    Mouth sore   Magic  "mouthwash     Hypomagnesemia: Replaced    Mild hyponatremia: monitor   Continue loop diuresis, not likely contributing to encephalopathy      Bilateral leg swelling: US with no DVT.    Continue aggressive IV iuresis as above, monitor inpatient labs     Coagulopathy: due to liver cirrhosis.     No signs of bleeding. Monitor. Vitamin K as indicated     Chronic anemia likely secondary to chronic bone marrow suppression   Hemoglobin stable. No signs of acute blood loss    Chronic thrombocytopenia: due to liver cirrhosis.    Monitor as outpatient     Tobacco use disorder: Encouraged complete cessation      Obesity: BMI 39.26     Barriers to discharge: Gastroenterology recommendations, improvement in encephalopathy    Anticipated length of stay: 1 to 2 days    Spent greater than 15 minutes in the room with patient and patient's sister.  Answered all questions.    Subjective:  Patient complaining of body pain with continued abdominal pain.  Notes she still feels \"fuzzy\".  Sister at bedside noting she is almost back to her normal mental status.  Patient also complaining of a sore to her tongue.  No other complaints.    PHYSICAL EXAM  Temp:  [97.8  F (36.6  C)-98.9  F (37.2  C)] 98  F (36.7  C)  Pulse:  [76-95] 76  Resp:  [17-20] 20  BP: (112-143)/(46-75) 125/46  SpO2:  [94 %-97 %] 97 %  Wt Readings from Last 1 Encounters:   03/22/22 95.6 kg (210 lb 11.2 oz)       Intake/Output Summary (Last 24 hours) at 3/22/2022 1325  Last data filed at 3/22/2022 1000  Gross per 24 hour   Intake 2563 ml   Output 3300 ml   Net -737 ml      Body mass index is 35.06 kg/m .    GENRL: Alert and answering questions. Not in acute distress.  Sitting up in bed and walking around.    HEENT: no lymphadenopathy or thyromegaly  CHEST: Clear to auscultation bilaterally. No wheezes, rhonchi or crackles. Breathing easily   HEART: Regular rate and rhythm, S1S2 auscultated.   ABDMN: Soft.  Diffuse tenderness with palpation.  non-distended. No organomegaly. " No guarding or rigidity. Bowel sounds present   EXTRM: Significant bilateral lower extremity edema.  DP pulses 2+.   NEURO: Tremors and asterixis noted   PSYCH: flat affect and mood.   INTGM: No skin rash, no cyanosis or clubbing    PERTINENT LABS/IMAGING:  Results for orders placed or performed during the hospital encounter of 03/15/22   CT Abdomen Pelvis w Contrast    Impression    IMPRESSION:   1.  Hepatic cirrhosis and steatosis portal hypertension including large volume ascites, mild splenic enlargement and small caliber upper abdominal varices.  2.  Cholelithiasis.   US Paracentesis    Impression    IMPRESSION:  1.  Status post ultrasound-guided paracentesis.    Reference CPT Code: 00864   US Lower Extremity Venous Duplex Bilateral    Impression    IMPRESSION:  1.  No deep venous thrombosis in the bilateral lower extremities.   US Abdomen Limited w Abd/Pelvis Duplex Complete    Impression    IMPRESSION:  1.  Cirrhosis.  2.  Portal hypertension with splenomegaly and ascites.  3.  Cholelithiasis.  4.  Left hepatic vein not visualized, otherwise normal abdominal liver duplex.           Most Recent 2 LFT's:Recent Labs   Lab Test 03/22/22  0519 03/21/22  0536   * 234*   ALT 72* 74*   ALKPHOS 100 117   BILITOTAL 9.1* 9.4*     Most Recent 3 INR's:Recent Labs   Lab Test 03/22/22  0519 03/21/22  0942 03/16/22  0623   INR 2.96* 2.39* 2.66*       No results for input(s): CHOL, HDL, LDL, TRIG, CHOLHDLRATIO in the last 62855 hours.  No results for input(s): LDL in the last 91760 hours.  Recent Labs   Lab Test 03/22/22  0519   *   POTASSIUM 3.8   CHLORIDE 93*   CO2 29   GLC 98   BUN 11   CR 0.83   GFRESTIMATED 79   DAWIT 8.0*     No results for input(s): A1C in the last 36700 hours.  Recent Labs   Lab Test 03/21/22  0830 03/20/22  0531 03/18/22  0558   HGB 10.0* 9.8* 9.6*     Recent Labs   Lab Test 10/31/21  2241 10/31/21  2040   TROPONINI 0.02 0.02     No results for input(s): BNP, NTBNPI, NTBNP in the last  77645 hours.  No results for input(s): TSH in the last 39709 hours.  Recent Labs   Lab Test 03/22/22  0519 03/21/22  0942 03/16/22  0623   INR 2.96* 2.39* 2.66*       Joan Christensen DO  [facility] Medicine Service  [phone]

## 2022-03-22 NOTE — CONSULTS
"[facility] ACUTE PAIN SERVICE CONSULTATION     Date of Admission:  3/15/2022  Date of Consult (When I saw the patient): 03/22/22  Physician requesting consult: Dr. Ochoa   Reason for consult: complex pain management, alternative options for chronic pain besides narcotics, try to limit narcotics as able as inpatient  Primary Care Physician:  Dr. Luke     Assessment/Plan:     Marj Slater is a 61 year old female who was admitted on 3/15/2022.  Pain team was asked to see the patient for complex pain management. Admitted evaluation of abdominal pain, leg edema, weight gain. Patient is jaundice secondary to cirrhosis, and CT showed hepatic cirrhosis and steatosis portal HTN. History including chronic hepatitis C, cirrhosis due to alcohol.  Patient had been having chills a couple of days prior to admission, and some dyspnea, reported had gained about 30 lb recently. Patient is s/p diagnostic and therapeutic paracentesis 3/15/22, 3.7 L removed.  Patient now with hepatic encephalopathy, possible contribution from opioids.  GI consulted, lactulose and rifaximin continued, duplex US ordered, EGD scheduled for 3/30.  Oxycodone 5 mg has been used for pain, rating between 3-10/10, but patient has been confused, agitated. Describes pain as 5/10 and sore on the left side of tongue, also with BLE and low back pain - aching, sore, \"tight\". Reports loose stools. Also reports \"still feels out of it\" but less confused and agitated. Feels anxious. The patient denies nausea, vomiting, constipation, chest pain, shortness of breath. Diet is regular, Na less than 2400 mg.  The patient does  smoke and has chemical dependency history(per report, sober since 2021.  Sister at bedside who is a retired NP-C.     Opioid Induced Respiratory Depression Risk Assessment:?High, opioid naive status, cirrhosis    PLAN:   1) Pain is consistent with tongue pain, low back pain. History of Hepatitis C s/p treatment, cirrhosis, alcohol abuse " admitted with LE edema, alcoholic liver cirrhosis with ascites. Last alcohol use October 2021. CT on admission showed hepatic cirrhosis and steatosis, portal hypertension including large volume ascites, splenic enlargement and small caliber upper abdominal varices. Status post diagnostic and therapeutic paracentesis 3/15/22.   Now with with encephalopathic with rising LFTs. Elevated ammonia level on lactulose. On lasix as well.   2)Multimodal Medication Therapy  Topical: vicious lidocaine swish and spit, lidocaine ointment tid prn to BLEs and low back   NSAID'S: CrCl 79 ml/min, no NSAIDs due to hepatic function  Muscle Relaxants: denies spasms  Adjuvants: Tylenol 325 mg po q 6 h prn  Antidepressants/anxiolytics: home dose of sertraline 50 mg daily has not been ordered.   Opioids: oxycodone 5 mg po q 4 h prn - change to tid prn   IV Pain medication: none  3)Non-medication interventions: ice vs heat  Acupuncture consult, Integrative consult - if patient agreeable   4)Constipation Prophylaxis: lactulose 20 gm tid, Miralax daily  5) Care Teams: HMS, GI    -Opioid prescriber has been none  -[facility]  pulled from system on 03/22/22. This indicates not prescribed opioids in time period of last year.   Discharge Recommendations - We recommend prescribing the following at the time of discharge: no opioids     History of Present Illness (HPI):       Marj Slater is a 61 year old old female who presented for evaluation of abdominal pain, leg swelling, jaundice.  Past medical history as above. The pain is reported to be acute, located in the left side of tongue, low back, abdomen, BLEs. Current pain is rated at 5/10 and goal is 0/10.     Per [facility]  review, the patient does not have an opioid tolerance.     Reviewed medical record, labs, imaging, ED note, and care everywhere.     Past pain treatments have included: Pain Clinic-no, patient is followed by [facility] for chronic hepatitis C.      Home pain medications/psych  medications/anticoagulation medications include: sertraline     Medical History   PAST MEDICAL HISTORY:   Past Medical History:   Diagnosis Date     Chronic hepatitis C (H)      History of pneumonectomy        PAST SURGICAL HISTORY: History reviewed. No pertinent surgical history.    FAMILY HISTORY: History reviewed. No pertinent family history.    SOCIAL HISTORY:   Social History     Tobacco Use     Smoking status: Current Every Day Smoker     Packs/day: 0.50     Smokeless tobacco: Not on file   Substance Use Topics     Alcohol use: Not on file        HEALTH & LIFESTYLE PRACTICES  Tobacco:  reports that she has been smoking. She has been smoking about 0.50 packs per day. She does not have any smokeless tobacco history on file.  Alcohol:  has no history on file for alcohol use.  Illicit drugs:  has no history on file for drug use.    Allergies  No Known Allergies    Problem List  Patient Active Problem List    Diagnosis Date Noted     Anasarca 03/16/2022     Priority: Medium     Alcoholic cirrhosis of liver with ascites (H) 03/15/2022     Priority: Medium     History of pneumonectomy 05/12/2020     Priority: Medium     Abnormal cervical Papanicolaou smear 05/12/2020     Priority: Medium     Prolapsed internal hemorrhoids 10/29/2019     Priority: Medium     Formatting of this note might be different from the original.  Added automatically from request for surgery [id number]       Chronic hepatitis C virus infection (H) 02/21/2008     Priority: Medium       Prior to Admission Medications   Medications Prior to Admission   Medication Sig Dispense Refill Last Dose     sertraline (ZOLOFT) 50 MG tablet Take 50 mg by mouth daily   3/15/2022 at Unknown time       Review of Systems  Complete ROS reviewed, unless noted in HPI, all other systems reviewed (with patient) and all others found to be negative.      Objective:     Physical Exam:  /53 (BP Location: Left arm)   Pulse 84   Temp 98  F (36.7  C) (Oral)   Resp 20    "Ht 1.651 m (5' 5\")   Wt 95.6 kg (210 lb 11.2 oz)   SpO2 94%   BMI 35.06 kg/m    Weight:   Vitals:    03/20/22 0447 03/21/22 0340 03/22/22 0542   Weight: 99 kg (218 lb 3.2 oz) 97.8 kg (215 lb 11.2 oz) 95.6 kg (210 lb 11.2 oz)      Body mass index is 35.06 kg/m .    General Appearance:  Alert, cooperative, no distress, sister at bedside     Head:  Normocephalic, without obvious abnormality, atraumatic   Eyes:  PERRL, conjunctiva/corneas clear, EOM's intact   ENT/Throat: Lips, mucosa, normal; teeth and gums normal, left side of tongue with open area, no erythema    Lymph/Neck: Supple, symmetrical, trachea midline   Lungs:   Clear to auscultation bilaterally, respirations unlabored   Cardiovascular/Heart:  Regular rate and rhythm, S1, S2 normal,no murmur, rub or gallop.    Abdomen:   Soft, non-tender, bowel sounds active all four quadrants,    Musculoskeletal: BLE edema    Skin: Skin warm, dry    Neurologic: Alert and oriented X 3, Moves all 4 extremities     Psych: Affect is appropriate      Imaging: Reviewed  US Lower Extremity Venous Duplex Bilateral    Result Date: 3/17/2022  EXAM: US LOWER EXTREMITY VENOUS DUPLEX BILATERAL LOCATION: [facility] DATE/TIME: 3/17/2022 11:22 AM INDICATION: bilateral lower leg pain and swelling. Ruling out DVT. COMPARISON: None. TECHNIQUE: Venous Duplex ultrasound of bilateral lower extremities with and without compression, augmentation and duplex. Color flow and spectral Doppler with waveform analysis performed. FINDINGS: Exam includes the common femoral, femoral, popliteal veins as well as segmentally visualized deep calf veins and greater saphenous vein. Overall difficult study due to patient's swelling. RIGHT: No deep vein thrombosis. No superficial thrombophlebitis. No popliteal cyst. LEFT: No deep vein thrombosis. No superficial thrombophlebitis. No popliteal cyst.     IMPRESSION: 1.  No deep venous thrombosis in the bilateral lower extremities.    US " Paracentesis    Result Date: 3/15/2022  EXAM: 1. PARACENTESIS 2. ULTRASOUND GUIDANCE LOCATION: [facility] DATE/TIME: 3/15/2022 8:37 PM INDICATION: Ascites. PROCEDURE: Informed consent obtained. Time out performed. The abdomen was prepped and draped in a sterile fashion. 10 mL of 1% lidocaine was infused into local soft tissues. A 5 Romansh catheter system was introduced into the abdominal ascites under ultrasound guidance. 3.7 liters of clear fluid were removed and sent to the lab. Patient tolerated procedure well. Ultrasound imaging was obtained and placed in the patient's permanent medical record.     IMPRESSION: 1.  Status post ultrasound-guided paracentesis. Reference CPT Code: 83057    CT Abdomen Pelvis w Contrast    Result Date: 3/15/2022  EXAM: CT ABDOMEN PELVIS W CONTRAST LOCATION: [facility] DATE/TIME: 3/15/2022 6:26 PM INDICATION: Abdominal distention. COMPARISON: None. TECHNIQUE: CT scan of the abdomen and pelvis was performed following injection of IV contrast. Multiplanar reformats were obtained. Dose reduction techniques were used. CONTRAST: Isovue 370, 100 mL. FINDINGS: LOWER CHEST: Visualized lungs are clear. No pleural effusion. Heart size normal with no pericardial effusion. HEPATOBILIARY: Hepatic cirrhosis and steatosis. No liver mass. No bile duct dilatation. A calcified stone in the otherwise normal appearing gallbladder. PANCREAS: Normal. SPLEEN: Spleen is enlarged at 14 cm. ADRENAL GLANDS: Normal. KIDNEY/BLADDER: Nonobstructing 2 mm right renal stone.  Kidneys, ureters and bladder are otherwise normal. BOWEL: Large volume ascites. Colonic diverticulosis. Bowel is otherwise normal with no obstruction or inflammatory change. LYMPH NODES: No lymphadenopathy. VASCULATURE: Normal caliber abdominal aorta.  Main portal vein patent. Small caliber upper abdominal varices. PELVIC ORGANS: No pelvic mass or fluid. MUSCULOSKELETAL: Unremarkable.      IMPRESSION: 1.  Hepatic cirrhosis and steatosis portal hypertension including large volume ascites, mild splenic enlargement and small caliber upper abdominal varices. 2.  Cholelithiasis.    US Abdomen Limited w Abd/Pelvis Duplex Complete    Result Date: 3/21/2022  EXAM: US ABDOMEN LIMITED WITH DOPPLER COMPLETE LOCATION: [facility] DATE/TIME: 3/21/2022 1:53 PM INDICATION: HIGH VOLUME paracentesis with or without diagnostic fluid analysis with labs to be drawn if ordered. Total paracentesis volume as much as possible. COMPARISON: 3/15/2022 CT TECHNIQUE: Complete abdominal ultrasound. Color flow with spectral Doppler and waveform analysis performed.  FINDINGS: GALLBLADDER: Single gallstone. Upper normal wall thickness of 3 mm. BILE DUCTS: No biliary dilatation. The common duct measures 6 mm. LIVER: Coarsened echotexture, suggesting underlying fibrosis. Nodular contour. No focal mass. RIGHT KIDNEY: Normal size. Normal echogenicity with no hydronephrosis or mass. LEFT KIDNEY: Normal size. No hydronephrosis. SPLEEN: Enlarged measuring 13.5 cm craniocaudal PANCREAS: The visualized portions are normal. AORTA: Normal in caliber. IVC: Normal where visualized. Trace ascites. ABDOMINAL DUPLEX: The hepatic artery, right and middle hepatic veins, IVC, portal veins, and splenic vein are patent with flow in the normal direction. The left hepatic vein could not be visualized.     IMPRESSION: 1.  Cirrhosis. 2.  Portal hypertension with splenomegaly and ascites. 3.  Cholelithiasis. 4.  Left hepatic vein not visualized, otherwise normal abdominal liver duplex.       Labs: Reviewed   Recent Results (from the past 24 hour(s))   CBC with platelets    Collection Time: 03/21/22  8:30 AM   Result Value Ref Range    WBC Count 9.7 4.0 - 11.0 10e3/uL    RBC Count 2.99 (L) 3.80 - 5.20 10e6/uL    Hemoglobin 10.0 (L) 11.7 - 15.7 g/dL    Hematocrit 29.3 (L) 35.0 - 47.0 %    MCV 98 78 - 100 fL    MCH 33.4 (H) 26.5 -  33.0 pg    MCHC 34.1 31.5 - 36.5 g/dL    RDW 15.9 (H) 10.0 - 15.0 %    Platelet Count 144 (L) 150 - 450 10e3/uL   Vitamin B12    Collection Time: 03/21/22  9:42 AM   Result Value Ref Range    Vitamin B12 1,688 (H) 213 - 816 pg/mL   Folate    Collection Time: 03/21/22  9:42 AM   Result Value Ref Range    Folic Acid 6.3 >=3.5 ng/mL   INR    Collection Time: 03/21/22  9:42 AM   Result Value Ref Range    INR 2.39 (H) 0.90 - 1.15   UA reflex to Microscopic and Culture    Collection Time: 03/21/22  1:27 PM    Specimen: Urine, Midstream   Result Value Ref Range    Color Urine Yellow Colorless, Straw, Light Yellow, Yellow    Appearance Urine Clear Clear    Glucose Urine Negative Negative mg/dL    Bilirubin Urine Negative Negative    Ketones Urine Negative Negative mg/dL    Specific Gravity Urine 1.012 1.001 - 1.030    Blood Urine Negative Negative    pH Urine 5.5 5.0 - 7.0    Protein Albumin Urine Negative Negative mg/dL    Urobilinogen Urine 4.0 (A) <2.0 mg/dL    Nitrite Urine Negative Negative    Leukocyte Esterase Urine Negative Negative   Extra Red Top Tube    Collection Time: 03/21/22  8:33 PM   Result Value Ref Range    Hold Specimen JIC    Magnesium    Collection Time: 03/22/22  5:19 AM   Result Value Ref Range    Magnesium 1.7 (L) 1.8 - 2.6 mg/dL   Comprehensive metabolic panel    Collection Time: 03/22/22  5:19 AM   Result Value Ref Range    Sodium 130 (L) 136 - 145 mmol/L    Potassium 3.8 3.5 - 5.0 mmol/L    Chloride 93 (L) 98 - 107 mmol/L    Carbon Dioxide (CO2) 29 22 - 31 mmol/L    Anion Gap 8 5 - 18 mmol/L    Urea Nitrogen 11 8 - 22 mg/dL    Creatinine 0.83 0.60 - 1.10 mg/dL    Calcium 8.0 (L) 8.5 - 10.5 mg/dL    Glucose 98 70 - 125 mg/dL    Alkaline Phosphatase 100 45 - 120 U/L     (H) 0 - 40 U/L    ALT 72 (H) 0 - 45 U/L    Protein Total 7.0 6.0 - 8.0 g/dL    Albumin 2.4 (L) 3.5 - 5.0 g/dL    Bilirubin Total 9.1 (H) 0.0 - 1.0 mg/dL    GFR Estimate 79 >60 mL/min/1.73m2   Ammonia    Collection Time:  03/22/22  5:19 AM   Result Value Ref Range    Ammonia 41 (H) 11 - 35 umol/L   INR    Collection Time: 03/22/22  5:19 AM   Result Value Ref Range    INR 2.96 (H) 0.90 - 1.15   Bilirubin direct    Collection Time: 03/22/22  5:19 AM   Result Value Ref Range    Bilirubin Direct 2.8 (H) <=0.5 mg/dL   Extra Purple Top Tube    Collection Time: 03/22/22  5:19 AM   Result Value Ref Range    Hold Specimen JIC        Total time spent 80 minutes with greater than 50% in consultation, education and coordination of care, examination of patient, review of medical record, lab and imaging results, completion of documentation, and discussion with RN, MD, Senior Acupuncturist, and pharmacist.      Thank you for this consultation.    FIGUEROA LottP-C  Acute Care Pain Management Program  [facility] ([place], [place], [place])  Monday-Friday 8a-4p   Page via online paging system or call [phone]

## 2022-03-22 NOTE — PROGRESS NOTES
Updated patient on transfer to room 124. Agreeable. Patient to transfer to room 124, Report called to Roberto CORNELIUS on P1. New room not ready yet. She will call when room cleaned and ready.

## 2022-03-22 NOTE — PLAN OF CARE
Problem: Pain Chronic (Persistent)  Goal: Acceptable Pain Control and Functional Ability  Outcome: Ongoing, Not Progressing  Intervention: Develop Pain Management Plan  Recent Flowsheet Documentation  Taken 3/22/2022 0700 by Lissy Becerra RN  Pain Management Interventions: distraction  Taken 3/22/2022 0626 by Lissy Becerra RN  Pain Management Interventions: medication (see MAR)  Taken 3/22/2022 0542 by Lissy Becerra RN  Pain Management Interventions: rest  Taken 3/22/2022 0100 by Lissy Becerra RN  Pain Management Interventions: rest  Taken 3/22/2022 0037 by Lissy Becerra RN  Pain Management Interventions:   medication (see MAR)   rest  Intervention: Manage Persistent Pain  Recent Flowsheet Documentation  Taken 3/22/2022 0000 by Lissy Becerra RN  Medication Review/Management: medications reviewed   Goal Outcome Evaluation:           Patient continues to complain of pain in legs and abdomen at midnight.  Oxycodone 5 mg given, which some relief.  At 0630, patient felt pain 10/10 on left side of tongue.  Requested oxycodone 5 mg.  When nurse reassessed, patient said pain was not improved.  Message left for MD.  RN attempted to assess tongue, but movement of patient and positioning due to pain made it difficult.    Patient slept on and off during night.  Difficult to tell how much sleep she was able to get.  Patient had some confusion to place, and time.      Output measured and patient had multiple watery stools.    Sister called and spoke to nurse.  Sister requested a care conference and that am dose of lactulose be held to let patient sleep.  Day shift RN updated, and message left for  for care conference.

## 2022-03-22 NOTE — PROGRESS NOTES
"CLINICAL NUTRITION SERVICES - REASSESSMENT NOTE     Nutrition Prescription    RECOMMENDATIONS FOR MDs/PROVIDERS TO ORDER:    Malnutrition Status:    Does not meet 2 criteria for malnutrition    Recommendations already ordered by Registered Dietitian (RD):  Continue current diet  Recommended nutritional supplements but pt declines    Future/Additional Recommendations:  Follow po intake and nutritional needs     EVALUATION OF THE PROGRESS TOWARD GOALS   Diet: Low Saturated Fat/2400 mg Sodium  Intake: 0-75% 3/20-3/21.  Pt states she has sore in her mouth. Cold foods hurt.  She declines nutritional supplements x 2   Pt has no questions on the low sodium diet at this time.     ANTHROPOMETRICS  Height: 165.1 cm (5' 5\")  Most Recent Weight: 95.6 kg (210 lb 11.2 oz)    IBW: 56.8 kg (125 lb)  BMI: Obesity Grade II BMI 35-39.9  Weight History:   Wt Readings from Last 10 Encounters:   03/22/22 95.6 kg (210 lb 11.2 oz)   10/31/21 100.2 kg (221 lb)   Weight down 11 lb since 10/31/2021 could be related to diuresis    PHYSICAL FINDINGS  See malnutrition section below.  Skin: intact.  Zachery score=20, nutrition noted as adequate    GI CONCERNS  GI discomfort (gas)  Last BM 3/21/2022 (on lactulose)    LABS  Reviewed: Na 130, K 3.8, Mg 1.7, Alb 2.4, T.bili 9.1, ammonia 41, Glu 98    MEDICATIONS  Reviewed: lasix, lactulose, miralax    MALNUTRITION:  % Weight Loss:  Weight loss does not meet criteria for malnutrition   % Intake:  Decreased intake does not meet criteria for malnutrition   Subcutaneous Fat Loss:  None observed  Muscle Loss:  None observed  Fluid Retention:  Trace to mild    Malnutrition Diagnosis: Patient does not meet two of the above criteria necessary for diagnosing malnutrition    CURRENT NUTRITION DIAGNOSIS  Inadequate oral intake related to mouth sore as evidenced by ate 0% meals on 3/21      INTERVENTIONS  Implementation  Encouraged liquids.  Recommended nutritional supplements but pt declines x " 2    Goals  Patient to consume % of nutritionally adequate meals three times per day, or the equivalent with supplements/snacks.    Monitoring/Evaluation  Progress toward goals will be monitored and evaluated per protocol.

## 2022-03-22 NOTE — PROGRESS NOTES
"GI PROGRESS NOTE  3/21/2022  Marj BARCENAS Bryon  1960  /-10    Subjective:    Feels more alert today but \"still out of it.\" Had 3 loose watery stools this morning. Up all night in the bathroom with bms and voiding.      Objective:     Blood pressure 113/53, pulse 84, temperature 98  F (36.7  C), temperature source Oral, resp. rate 20, height 1.651 m (5' 5\"), weight 95.6 kg (210 lb 11.2 oz), SpO2 94 %.    Body mass index is 35.06 kg/m .  Gen: NAD  Cardio: RRR  GI: Non-distended, BS positive, soft, diffuse tenderness throughout  Neuro: Alert, orientated x3 but appears restless. No asterixis.     Laboratory:  BMP  Recent Labs   Lab 03/22/22  0519 03/21/22  0536 03/20/22  0531   * 131* 134*   POTASSIUM 3.8 3.8 3.6   CHLORIDE 93* 92* 93*   DAWIT 8.0* 8.7 8.4*   CO2 29 29 27   BUN 11 12 10   CR 0.83 0.84 0.88   GLC 98 100 78     CBC  Recent Labs   Lab 03/21/22  0830 03/20/22  0531 03/18/22  0558 03/16/22  0623   WBC 9.7 7.6  --  6.3   RBC 2.99* 2.94*  --  2.58*   HGB 10.0* 9.8* 9.6* 8.8*   HCT 29.3* 29.3*  --  26.0*   MCV 98 100  --  101*   MCH 33.4* 33.3*  --  34.1*   MCHC 34.1 33.4  --  33.8   RDW 15.9* 15.9*  --  16.5*   * 137*  --  92*     INR  Recent Labs   Lab 03/22/22  0519 03/21/22  0942 03/16/22  0623   INR 2.96* 2.39* 2.66*      LFTs  Recent Labs   Lab Test 03/22/22  0519 03/21/22  1327 03/21/22  0536 03/16/22  0623 03/15/22  1712 03/15/22  1623   ALBUMIN 2.4*  --  2.8* 1.9*   < >  --    BILITOTAL 9.1*  --  9.4* 5.0*   < >  --    ALT 72*  --  74* 21   < >  --    *  --  234* 38   < >  --    PROTEIN  --  Negative  --   --   --  30 *    < > = values in this interval not displayed.     Imaging:  EXAM: US ABDOMEN LIMITED WITH DOPPLER COMPLETE  LOCATION: [facility]  DATE/TIME: 3/21/2022 1:53 PM     INDICATION: HIGH VOLUME paracentesis with or without diagnostic fluid analysis with labs to be drawn if ordered. Total paracentesis volume as much as " possible.  COMPARISON: 3/15/2022 CT  TECHNIQUE: Complete abdominal ultrasound. Color flow with spectral Doppler and waveform analysis performed.     FINDINGS:  GALLBLADDER: Single gallstone. Upper normal wall thickness of 3 mm.   BILE DUCTS: No biliary dilatation. The common duct measures 6 mm.  LIVER: Coarsened echotexture, suggesting underlying fibrosis. Nodular contour. No focal mass.   RIGHT KIDNEY: Normal size. Normal echogenicity with no hydronephrosis or mass.   LEFT KIDNEY: Normal size. No hydronephrosis.  SPLEEN: Enlarged measuring 13.5 cm craniocaudal  PANCREAS: The visualized portions are normal.  AORTA: Normal in caliber.  IVC: Normal where visualized.  Trace ascites.     ABDOMINAL DUPLEX: The hepatic artery, right and middle hepatic veins, IVC, portal veins, and splenic vein are patent with flow in the normal direction. The left hepatic vein could not be visualized.                                       IMPRESSION:  1.  Cirrhosis.  2.  Portal hypertension with splenomegaly and ascites.  3.  Cholelithiasis.  4.  Left hepatic vein not visualized, otherwise normal abdominal liver duplex.    1. PARACENTESIS  2. ULTRASOUND GUIDANCE  LOCATION: [facility]  DATE/TIME: 3/15/2022 8:37 PM     INDICATION: Ascites.     PROCEDURE: Informed consent obtained. Time out performed. The abdomen was prepped and draped in a sterile fashion. 10 mL of 1% lidocaine was infused into local soft tissues. A 5 [place] catheter system was introduced into the abdominal ascites under   ultrasound guidance.     3.7 liters of clear fluid were removed and sent to the lab.     Patient tolerated procedure well.     Ultrasound imaging was obtained and placed in the patient's permanent medical record.                                                                      IMPRESSION:  1.  Status post ultrasound-guided paracentesis.    EXAM: CT ABDOMEN PELVIS W CONTRAST  LOCATION: [facility]  HOSPITAL  DATE/TIME: 3/15/2022 6:26 PM                                 IMPRESSION:   1.  Hepatic cirrhosis and steatosis portal hypertension including large volume ascites, mild splenic enlargement and small caliber upper abdominal varices.  2.  Cholelithiasis.     Assessment:   61 year-old female with h/o Hepatitis C s/p treatment, cirrhosis, alcohol abuse admitted with LE edema. Now she is encephalopathic with rising LFTs.     1. Elevated LFTs  2. Encephalopathy  Cause unclear. Ammonia elevated. Now on lactulose and rifaximin. Was on narcotics from pain control, but now these have been held, pain team consulted. UA normal. Duplex US negative for thrombus or other abnormality. No ascites on US. Previous tap negative for SBP. WBC normal. Folate, Vit B12 normal. Acetaminophen level normal.      Bilirubin elevated at 9.1, but mostly indirect. Transaminases mildly improved. Blood cultures, C. Diff PCR pending. EBV, CMV, PETH pending. May need to consider intracranial imaging if encephalopathy not improving. Also consider liver bx if LFTs do not improve and underlying cause remains unclear.     She has had     3. Cirrhosis with ascites  MELD 28. Undergoing diuresis for anasarca. Reports sobriety since 2021, PETH pending. Needs liver clinic follow-up and screening EGD (twin scheduled 3/30/22).      Plan:     1. Await pending labs.   2. Follow-up pending blood cultures and C. diff  3. Consider intracranial imaging   4. Trend LFTs  5. Decrease lactulose to twice daily, continue rifaximin  6. Outpatient liver clinic follow-up  7. EGD currently scheduled 3/30/22                                                                         Faviola Leyva PA-C  Thank you for the opportunity to participate in the care of this patient.   Please feel free to call me with any questions or concerns.  Phone number [phone].

## 2022-03-23 ENCOUNTER — APPOINTMENT (OUTPATIENT)
Dept: OCCUPATIONAL THERAPY | Facility: HOSPITAL | Age: 62
DRG: 432 | End: 2022-03-23
Attending: INTERNAL MEDICINE
Payer: COMMERCIAL

## 2022-03-23 ENCOUNTER — APPOINTMENT (OUTPATIENT)
Dept: OCCUPATIONAL THERAPY | Facility: HOSPITAL | Age: 62
DRG: 432 | End: 2022-03-23
Payer: COMMERCIAL

## 2022-03-23 ENCOUNTER — APPOINTMENT (OUTPATIENT)
Dept: PHYSICAL THERAPY | Facility: HOSPITAL | Age: 62
DRG: 432 | End: 2022-03-23
Attending: INTERNAL MEDICINE
Payer: COMMERCIAL

## 2022-03-23 LAB
ALBUMIN SERPL-MCNC: 2.6 G/DL (ref 3.5–5)
ALP SERPL-CCNC: 112 U/L (ref 45–120)
ALT SERPL W P-5'-P-CCNC: 82 U/L (ref 0–45)
ANION GAP SERPL CALCULATED.3IONS-SCNC: 9 MMOL/L (ref 5–18)
AST SERPL W P-5'-P-CCNC: 232 U/L (ref 0–40)
BASOPHILS # BLD AUTO: 0.1 10E3/UL (ref 0–0.2)
BASOPHILS NFR BLD AUTO: 1 %
BILIRUB DIRECT SERPL-MCNC: 3.1 MG/DL
BILIRUB SERPL-MCNC: 9.8 MG/DL (ref 0–1)
BUN SERPL-MCNC: 11 MG/DL (ref 8–22)
CALCIUM SERPL-MCNC: 8.3 MG/DL (ref 8.5–10.5)
CHLORIDE BLD-SCNC: 92 MMOL/L (ref 98–107)
CMV DNA SPEC NAA+PROBE-ACNC: NOT DETECTED IU/ML
CO2 SERPL-SCNC: 27 MMOL/L (ref 22–31)
CREAT SERPL-MCNC: 0.78 MG/DL (ref 0.6–1.1)
EOSINOPHIL # BLD AUTO: 0.1 10E3/UL (ref 0–0.7)
EOSINOPHIL NFR BLD AUTO: 1 %
ERYTHROCYTE [DISTWIDTH] IN BLOOD BY AUTOMATED COUNT: 16.3 % (ref 10–15)
GFR SERPL CREATININE-BSD FRML MDRD: 85 ML/MIN/1.73M2
GLUCOSE BLD-MCNC: 92 MG/DL (ref 70–125)
HAPTOGLOB SERPL-MCNC: <8 MG/DL (ref 33–171)
HAV IGM SERPL QL IA: NEGATIVE
HBV CORE IGM SERPL QL IA: NEGATIVE
HBV SURFACE AG SERPL QL IA: NONREACTIVE
HCT VFR BLD AUTO: 28.3 % (ref 35–47)
HCV AB SERPL QL IA: POSITIVE
HGB BLD-MCNC: 9.7 G/DL (ref 11.7–15.7)
HOLD SPECIMEN: NORMAL
IMM GRANULOCYTES # BLD: 0.1 10E3/UL
IMM GRANULOCYTES NFR BLD: 1 %
INR PPP: 2.43 (ref 0.9–1.15)
LYMPHOCYTES # BLD AUTO: 1.3 10E3/UL (ref 0.8–5.3)
LYMPHOCYTES NFR BLD AUTO: 14 %
MAGNESIUM SERPL-MCNC: 1.7 MG/DL (ref 1.8–2.6)
MCH RBC QN AUTO: 33.7 PG (ref 26.5–33)
MCHC RBC AUTO-ENTMCNC: 34.3 G/DL (ref 31.5–36.5)
MCV RBC AUTO: 98 FL (ref 78–100)
MONOCYTES # BLD AUTO: 1.5 10E3/UL (ref 0–1.3)
MONOCYTES NFR BLD AUTO: 16 %
NEUTROPHILS # BLD AUTO: 6.6 10E3/UL (ref 1.6–8.3)
NEUTROPHILS NFR BLD AUTO: 67 %
NRBC # BLD AUTO: 0 10E3/UL
NRBC BLD AUTO-RTO: 0 /100
PLATELET # BLD AUTO: 113 10E3/UL (ref 150–450)
PLPETH BLD-MCNC: <10 NG/ML
POPETH BLD-MCNC: <10 NG/ML
POTASSIUM BLD-SCNC: 3.4 MMOL/L (ref 3.5–5)
POTASSIUM BLD-SCNC: 3.7 MMOL/L (ref 3.5–5)
PROT SERPL-MCNC: 7.6 G/DL (ref 6–8)
RBC # BLD AUTO: 2.88 10E6/UL (ref 3.8–5.2)
RETICS # AUTO: 0.14 10E6/UL (ref 0.01–0.11)
RETICS/RBC NFR AUTO: 4.9 % (ref 0.8–2.7)
SARS-COV-2 RNA RESP QL NAA+PROBE: NEGATIVE
SODIUM SERPL-SCNC: 128 MMOL/L (ref 136–145)
WBC # BLD AUTO: 9.6 10E3/UL (ref 4–11)

## 2022-03-23 PROCEDURE — 83735 ASSAY OF MAGNESIUM: CPT | Performed by: INTERNAL MEDICINE

## 2022-03-23 PROCEDURE — 36415 COLL VENOUS BLD VENIPUNCTURE: CPT | Performed by: INTERNAL MEDICINE

## 2022-03-23 PROCEDURE — 97530 THERAPEUTIC ACTIVITIES: CPT | Mod: GO

## 2022-03-23 PROCEDURE — 83010 ASSAY OF HAPTOGLOBIN QUANT: CPT | Performed by: PHYSICIAN ASSISTANT

## 2022-03-23 PROCEDURE — 97535 SELF CARE MNGMENT TRAINING: CPT | Mod: GO

## 2022-03-23 PROCEDURE — 250N000011 HC RX IP 250 OP 636: Performed by: INTERNAL MEDICINE

## 2022-03-23 PROCEDURE — 97116 GAIT TRAINING THERAPY: CPT | Mod: GP | Performed by: PHYSICAL THERAPIST

## 2022-03-23 PROCEDURE — 97162 PT EVAL MOD COMPLEX 30 MIN: CPT | Mod: GP | Performed by: PHYSICAL THERAPIST

## 2022-03-23 PROCEDURE — 250N000013 HC RX MED GY IP 250 OP 250 PS 637: Performed by: HOSPITALIST

## 2022-03-23 PROCEDURE — 99233 SBSQ HOSP IP/OBS HIGH 50: CPT | Performed by: INTERNAL MEDICINE

## 2022-03-23 PROCEDURE — 85045 AUTOMATED RETICULOCYTE COUNT: CPT | Performed by: PHYSICIAN ASSISTANT

## 2022-03-23 PROCEDURE — 250N000013 HC RX MED GY IP 250 OP 250 PS 637: Performed by: NURSE PRACTITIONER

## 2022-03-23 PROCEDURE — 99232 SBSQ HOSP IP/OBS MODERATE 35: CPT | Performed by: NURSE PRACTITIONER

## 2022-03-23 PROCEDURE — 250N000013 HC RX MED GY IP 250 OP 250 PS 637: Performed by: PHYSICIAN ASSISTANT

## 2022-03-23 PROCEDURE — 250N000011 HC RX IP 250 OP 636: Performed by: PHYSICIAN ASSISTANT

## 2022-03-23 PROCEDURE — 250N000013 HC RX MED GY IP 250 OP 250 PS 637: Performed by: INTERNAL MEDICINE

## 2022-03-23 PROCEDURE — 85025 COMPLETE CBC W/AUTO DIFF WBC: CPT | Performed by: INTERNAL MEDICINE

## 2022-03-23 PROCEDURE — 84132 ASSAY OF SERUM POTASSIUM: CPT | Performed by: INTERNAL MEDICINE

## 2022-03-23 PROCEDURE — 120N000001 HC R&B MED SURG/OB

## 2022-03-23 PROCEDURE — 97530 THERAPEUTIC ACTIVITIES: CPT | Mod: GP | Performed by: PHYSICAL THERAPIST

## 2022-03-23 PROCEDURE — 250N000009 HC RX 250: Performed by: NURSE PRACTITIONER

## 2022-03-23 PROCEDURE — 258N000003 HC RX IP 258 OP 636: Performed by: PHYSICIAN ASSISTANT

## 2022-03-23 PROCEDURE — 85610 PROTHROMBIN TIME: CPT | Performed by: INTERNAL MEDICINE

## 2022-03-23 PROCEDURE — 87635 SARS-COV-2 COVID-19 AMP PRB: CPT | Performed by: INTERNAL MEDICINE

## 2022-03-23 PROCEDURE — 82248 BILIRUBIN DIRECT: CPT | Performed by: INTERNAL MEDICINE

## 2022-03-23 PROCEDURE — 80053 COMPREHEN METABOLIC PANEL: CPT | Performed by: INTERNAL MEDICINE

## 2022-03-23 PROCEDURE — 36415 COLL VENOUS BLD VENIPUNCTURE: CPT | Mod: TC | Performed by: PHYSICIAN ASSISTANT

## 2022-03-23 RX ORDER — OXYCODONE HYDROCHLORIDE 5 MG/1
5 TABLET ORAL 2 TIMES DAILY PRN
Status: DISCONTINUED | OUTPATIENT
Start: 2022-03-23 | End: 2022-03-25

## 2022-03-23 RX ORDER — MAGNESIUM OXIDE 400 MG/1
400 TABLET ORAL 2 TIMES DAILY
Status: COMPLETED | OUTPATIENT
Start: 2022-03-23 | End: 2022-03-24

## 2022-03-23 RX ORDER — POTASSIUM CHLORIDE 1500 MG/1
20 TABLET, EXTENDED RELEASE ORAL ONCE
Status: COMPLETED | OUTPATIENT
Start: 2022-03-23 | End: 2022-03-23

## 2022-03-23 RX ORDER — POTASSIUM CHLORIDE 1500 MG/1
40 TABLET, EXTENDED RELEASE ORAL ONCE
Status: COMPLETED | OUTPATIENT
Start: 2022-03-23 | End: 2022-03-23

## 2022-03-23 RX ORDER — ACETAMINOPHEN 325 MG/1
325 TABLET ORAL EVERY 8 HOURS PRN
Status: DISCONTINUED | OUTPATIENT
Start: 2022-03-23 | End: 2022-03-28 | Stop reason: HOSPADM

## 2022-03-23 RX ADMIN — RIFAXIMIN 550 MG: 550 TABLET ORAL at 08:22

## 2022-03-23 RX ADMIN — LIDOCAINE HYDROCHLORIDE 5 ML: 20 SOLUTION ORAL; TOPICAL at 12:17

## 2022-03-23 RX ADMIN — OXYCODONE HYDROCHLORIDE 5 MG: 5 TABLET ORAL at 08:21

## 2022-03-23 RX ADMIN — OXYCODONE HYDROCHLORIDE 5 MG: 5 TABLET ORAL at 23:59

## 2022-03-23 RX ADMIN — RIFAXIMIN 550 MG: 550 TABLET ORAL at 20:21

## 2022-03-23 RX ADMIN — SPIRONOLACTONE 100 MG: 25 TABLET, FILM COATED ORAL at 08:22

## 2022-03-23 RX ADMIN — LIDOCAINE HYDROCHLORIDE 5 ML: 20 SOLUTION ORAL; TOPICAL at 08:21

## 2022-03-23 RX ADMIN — FUROSEMIDE 40 MG: 10 INJECTION, SOLUTION INTRAMUSCULAR; INTRAVENOUS at 20:20

## 2022-03-23 RX ADMIN — PHYTONADIONE 5 MG: 10 INJECTION, EMULSION INTRAMUSCULAR; INTRAVENOUS; SUBCUTANEOUS at 17:17

## 2022-03-23 RX ADMIN — POTASSIUM CHLORIDE 20 MEQ: 1500 TABLET, EXTENDED RELEASE ORAL at 17:55

## 2022-03-23 RX ADMIN — FUROSEMIDE 40 MG: 10 INJECTION, SOLUTION INTRAMUSCULAR; INTRAVENOUS at 12:18

## 2022-03-23 RX ADMIN — POTASSIUM CHLORIDE 40 MEQ: 1500 TABLET, EXTENDED RELEASE ORAL at 12:18

## 2022-03-23 RX ADMIN — ACETAMINOPHEN 325 MG: 325 TABLET ORAL at 00:27

## 2022-03-23 RX ADMIN — LACTULOSE 20 G: 10 SOLUTION ORAL at 08:21

## 2022-03-23 RX ADMIN — LIDOCAINE HYDROCHLORIDE 5 ML: 20 SOLUTION ORAL; TOPICAL at 20:20

## 2022-03-23 RX ADMIN — MAGNESIUM OXIDE TAB 400 MG (241.3 MG ELEMENTAL MG) 400 MG: 400 (241.3 MG) TAB at 20:21

## 2022-03-23 RX ADMIN — ACETAMINOPHEN 325 MG: 325 TABLET ORAL at 20:38

## 2022-03-23 RX ADMIN — FUROSEMIDE 40 MG: 10 INJECTION, SOLUTION INTRAMUSCULAR; INTRAVENOUS at 06:08

## 2022-03-23 RX ADMIN — MAGNESIUM OXIDE TAB 400 MG (241.3 MG ELEMENTAL MG) 400 MG: 400 (241.3 MG) TAB at 12:18

## 2022-03-23 RX ADMIN — POLYETHYLENE GLYCOL 3350 17 G: 17 POWDER, FOR SOLUTION ORAL at 08:21

## 2022-03-23 RX ADMIN — OXYCODONE HYDROCHLORIDE 5 MG: 5 TABLET ORAL at 12:18

## 2022-03-23 RX ADMIN — LIDOCAINE HYDROCHLORIDE 5 ML: 20 SOLUTION ORAL; TOPICAL at 17:17

## 2022-03-23 ASSESSMENT — ACTIVITIES OF DAILY LIVING (ADL)
ADLS_ACUITY_SCORE: 8
ADLS_ACUITY_SCORE: 8
ADLS_ACUITY_SCORE: 9
ADLS_ACUITY_SCORE: 8
ADLS_ACUITY_SCORE: 9
ADLS_ACUITY_SCORE: 9
ADLS_ACUITY_SCORE: 8

## 2022-03-23 NOTE — PROGRESS NOTES
[facility]    PROGRESS NOTE - Hospitalist Service    ASSESSMENT AND PLAN     Principal Problem:    Anasarca  Active Problems:    Alcoholic cirrhosis of liver with ascites (H)    Chronic hepatitis C virus infection (H)    Marj Slater is a 61 year old female with history of alcoholic liver cirrhosis, hepatitis C status post treatment who presents with abdominal pain and distension, leg edema and weight gain.      Acute metabolic encephalopathy: Concerning for hepatic encephalopathy + possible contribution of narcotics              On lactulose for elevated ammonia.                Continue lactulose and titrate for 2-3 bowel movements per day.               Holding zoloft for now              Low dose benadryl for agitation and skin irritation from elevated bili              Rifaximin               Abdominal US 3/21 negative for tappable ascites or any portal venous thrombosis              Follow up pain team recommendations    GI work-up pending: EBV, PETH, peripheral smear, haptoglobin. CMV negative    Outpatient liver follow-up on 5/12, EGD scheduled 3/30.  Possible need for liver biopsy?   CT head negative for acute pathology                 Alcoholic liver cirrhosis with ascites              History of hepatitis C treated last year, last alcohol use October 31, 2021              CT on admission reviewed with hepatic cirrhosis and steatosis, portal hypertension including large volume ascites, mild splenic enlargement and small caliber upper abdominal varices.              Status post diagnostic and therapeutic paracentesis 3/15/22, 3.7 L removed, no concern for SBP              Continue IV furosemide and spironolactone as renal function is tolerating this well.               EGD scheduled for 3/30 with [facility] outpatient, [facility] follow up 5/12 in [place] as scheduled     Mouth sore              Magic mouthwash     Hypomagnesemia: Replaced     Mild hyponatremia: monitor               Continue loop diuresis, not likely contributing to encephalopathy      Bilateral leg swelling: US with no DVT.               Continue aggressive IV diuresis as above, monitor inpatient labs     Coagulopathy: due to liver cirrhosis.                No signs of bleeding. Monitor. Vitamin K as indicated     Chronic anemia               Hemoglobin stable. No signs of acute blood loss     Chronic thrombocytopenia: due to liver cirrhosis.               Monitor as outpatient     Tobacco use disorder: Encouraged complete cessation      Obesity: BMI 39.26     Barriers to discharge: Gastroenterology liver workup     Anticipated length of stay: 1 to 2 days    Family requesting to take patient home at discharge     Subjective:  Patient states she still feels confused.  Has tremors on exam.  Sitting up in a chair.  Continuing to complain of lower extremity edema, overall body pain and abdominal discomfort.  No other complaints.    PHYSICAL EXAM  Temp:  [97.6  F (36.4  C)-98.3  F (36.8  C)] 97.6  F (36.4  C)  Pulse:  [76-90] 76  Resp:  [18-22] 18  BP: (105-121)/(49-57) 112/57  SpO2:  [95 %-97 %] 95 %  Wt Readings from Last 1 Encounters:   03/22/22 95.6 kg (210 lb 11.2 oz)       Intake/Output Summary (Last 24 hours) at 3/23/2022 1350  Last data filed at 3/23/2022 0900  Gross per 24 hour   Intake 720 ml   Output 3125 ml   Net -2405 ml      Body mass index is 35.06 kg/m .    GENRL: Alert and answering questions. Not in acute distress.  Sitting up in a chair   HEENT: no lymphadenopathy or thyromegaly  CHEST: Clear to auscultation bilaterally. No wheezes, rhonchi or crackles. Breathing easily   HEART: Regular rate and rhythm, S1S2 auscultated.   ABDMN: Soft.  Diffuse tenderness with palpation.  non-distended. No organomegaly. No guarding or rigidity. Bowel sounds present   EXTRM: Significant bilateral lower extremity edema.  DP pulses 2+.   NEURO: Tremors and asterixis noted   PSYCH: flat affect and mood.   INTGM: No skin rash, no  cyanosis or clubbing    PERTINENT LABS/IMAGING:  Results for orders placed or performed during the hospital encounter of 03/15/22   CT Abdomen Pelvis w Contrast    Impression    IMPRESSION:   1.  Hepatic cirrhosis and steatosis portal hypertension including large volume ascites, mild splenic enlargement and small caliber upper abdominal varices.  2.  Cholelithiasis.   US Paracentesis    Impression    IMPRESSION:  1.  Status post ultrasound-guided paracentesis.    Reference CPT Code: 98605   US Lower Extremity Venous Duplex Bilateral    Impression    IMPRESSION:  1.  No deep venous thrombosis in the bilateral lower extremities.   US Abdomen Limited w Abd/Pelvis Duplex Complete    Impression    IMPRESSION:  1.  Cirrhosis.  2.  Portal hypertension with splenomegaly and ascites.  3.  Cholelithiasis.  4.  Left hepatic vein not visualized, otherwise normal abdominal liver duplex.         CT Head w/o Contrast    Impression    IMPRESSION:  1.  No acute intracranial process.     Most Recent 3 CBC's:Recent Labs   Lab Test 03/23/22  0722 03/21/22  0830 03/20/22  0531   WBC 9.6 9.7 7.6   HGB 9.7* 10.0* 9.8*   MCV 98 98 100   * 144* 137*     Most Recent 2 LFT's:Recent Labs   Lab Test 03/23/22  0722 03/22/22  0519   * 214*   ALT 82* 72*   ALKPHOS 112 100   BILITOTAL 9.8* 9.1*       No results for input(s): CHOL, HDL, LDL, TRIG, CHOLHDLRATIO in the last 02790 hours.  No results for input(s): LDL in the last 88153 hours.  Recent Labs   Lab Test 03/23/22  0722   *   POTASSIUM 3.4*   CHLORIDE 92*   CO2 27   GLC 92   BUN 11   CR 0.78   GFRESTIMATED 85   DAWIT 8.3*     No results for input(s): A1C in the last 44587 hours.  Recent Labs   Lab Test 03/23/22  0722 03/21/22  0830 03/20/22  0531   HGB 9.7* 10.0* 9.8*     Recent Labs   Lab Test 10/31/21  2241 10/31/21  2040   TROPONINI 0.02 0.02     No results for input(s): BNP, NTBNPI, NTBNP in the last 66916 hours.  No results for input(s): TSH in the last 22843  hours.  Recent Labs   Lab Test 03/23/22  0756 03/22/22  0519 03/21/22  0942   INR 2.43* 2.96* 2.39*       Joan Christensen DO  [facility] Medicine Service  [phone]

## 2022-03-23 NOTE — PROGRESS NOTES
Care Management Follow Up    Length of Stay (days): 8    Expected Discharge Date: 03/24/2022     Concerns to be Addressed:  Placement     Patient plan of care discussed at interdisciplinary rounds: Yes    Anticipated Discharge Disposition: Home vs TCU       Referrals Placed by CM/SW:   TCU  Private pay costs discussed: Not applicable    Additional Information:  CM spoke to pt regarding plan for discharge, she is ok with going to TCU but wants CM to follow up with her sister. CM tried two numbers to reach pt sister and both were disconnected. In the mean time CM sent referrals to TCU to start the discharge process. CM to follow for medical progression, recommendations, and final discharge plan.    TCU referrals made:  Peace Kim RN

## 2022-03-23 NOTE — PROGRESS NOTES
"GI PROGRESS NOTE  3/21/2022  Marj BARCENAS Bryon  1960  /-13    Subjective:   Reports dizziness and \"feeling out of it.\" No bowel movements yet today. Tolerating diet.      Objective:     Blood pressure 112/57, pulse 76, temperature 97.6  F (36.4  C), temperature source Oral, resp. rate 18, height 1.651 m (5' 5\"), weight 95.6 kg (210 lb 11.2 oz), SpO2 95 %.    Body mass index is 35.06 kg/m .  Gen: NAD  Cardio: RRR  GI: Non-distended, BS positive, soft, diffuse tenderness throughout  Neuro: Alert, orientated x4 but appears restless. No asterixis.     Laboratory:  BMP  Recent Labs   Lab 03/23/22  0722 03/22/22  0519 03/21/22  0536   * 130* 131*   POTASSIUM 3.4* 3.8 3.8   CHLORIDE 92* 93* 92*   DAWIT 8.3* 8.0* 8.7   CO2 27 29 29   BUN 11 11 12   CR 0.78 0.83 0.84   GLC 92 98 100     CBC  Recent Labs   Lab 03/23/22  0722 03/21/22  0830 03/20/22  0531   WBC 9.6 9.7 7.6   RBC 2.88* 2.99* 2.94*   HGB 9.7* 10.0* 9.8*   HCT 28.3* 29.3* 29.3*   MCV 98 98 100   MCH 33.7* 33.4* 33.3*   MCHC 34.3 34.1 33.4   RDW 16.3* 15.9* 15.9*   * 144* 137*     INR  Recent Labs   Lab 03/23/22  0756 03/22/22  0519 03/21/22  0942   INR 2.43* 2.96* 2.39*      LFTs  Recent Labs   Lab Test 03/23/22  0722 03/22/22  0519 03/21/22  1327 03/21/22  0536 03/15/22  1712 03/15/22  1623   ALBUMIN 2.6* 2.4*  --  2.8*   < >  --    BILITOTAL 9.8* 9.1*  --  9.4*   < >  --    ALT 82* 72*  --  74*   < >  --    * 214*  --  234*   < >  --    PROTEIN  --   --  Negative  --   --  30 *    < > = values in this interval not displayed.     Imaging:  EXAM: US ABDOMEN LIMITED WITH DOPPLER COMPLETE  LOCATION: [facility]  DATE/TIME: 3/21/2022 1:53 PM     INDICATION: HIGH VOLUME paracentesis with or without diagnostic fluid analysis with labs to be drawn if ordered. Total paracentesis volume as much as possible.  COMPARISON: 3/15/2022 CT  TECHNIQUE: Complete abdominal ultrasound. Color flow with spectral Doppler and " waveform analysis performed.     FINDINGS:  GALLBLADDER: Single gallstone. Upper normal wall thickness of 3 mm.   BILE DUCTS: No biliary dilatation. The common duct measures 6 mm.  LIVER: Coarsened echotexture, suggesting underlying fibrosis. Nodular contour. No focal mass.   RIGHT KIDNEY: Normal size. Normal echogenicity with no hydronephrosis or mass.   LEFT KIDNEY: Normal size. No hydronephrosis.  SPLEEN: Enlarged measuring 13.5 cm craniocaudal  PANCREAS: The visualized portions are normal.  AORTA: Normal in caliber.  IVC: Normal where visualized.  Trace ascites.     ABDOMINAL DUPLEX: The hepatic artery, right and middle hepatic veins, IVC, portal veins, and splenic vein are patent with flow in the normal direction. The left hepatic vein could not be visualized.                                       IMPRESSION:  1.  Cirrhosis.  2.  Portal hypertension with splenomegaly and ascites.  3.  Cholelithiasis.  4.  Left hepatic vein not visualized, otherwise normal abdominal liver duplex.    1. PARACENTESIS  2. ULTRASOUND GUIDANCE  LOCATION: [facility]  DATE/TIME: 3/15/2022 8:37 PM     INDICATION: Ascites.     PROCEDURE: Informed consent obtained. Time out performed. The abdomen was prepped and draped in a sterile fashion. 10 mL of 1% lidocaine was infused into local soft tissues. A 5 [place] catheter system was introduced into the abdominal ascites under   ultrasound guidance.     3.7 liters of clear fluid were removed and sent to the lab.     Patient tolerated procedure well.  Ultrasound imaging was obtained and placed in the patient's permanent medical record.                                             IMPRESSION:  1.  Status post ultrasound-guided paracentesis.    EXAM: CT ABDOMEN PELVIS W CONTRAST  LOCATION: [facility]  DATE/TIME: 3/15/2022 6:26 PM                                 IMPRESSION:   1.  Hepatic cirrhosis and steatosis portal hypertension including large  volume ascites, mild splenic enlargement and small caliber upper abdominal varices.  2.  Cholelithiasis.     Assessment:   61 year-old female with h/o Hepatitis C s/p treatment, cirrhosis, alcohol abuse admitted with LE edema. Now she is encephalopathic with rising LFTs.     1. Elevated LFTs  2. Encephalopathy  Cause unclear. Ammonia elevated. Now on lactulose and rifaximin. Was on narcotics from pain control, but now these have been held, pain team consulted. UA normal. Duplex US negative for thrombus or other abnormality. No ascites on US. Previous tap negative for SBP. WBC normal. Folate, Vit B12 normal. Acetaminophen level normal. Blood cultures, C. Diff PCR negative. Hep C antibody positive as expected, but Hep B and A negative. Head CT without acute changes.     Bilirubin elevated at 9.8, but mostly indirect. ?hemolytic or spur cell anemia. Will check smear and haptoglobin. EBV, CMV, PETH pending.  Also consider liver bx if LFTs do not improve and underlying cause remains unclear. However, due to elevated INR, this would need to be transjugular. If LFTs continue to worsen, will start antiviral therapy.     3. Cirrhosis with ascites  MELD 29. Undergoing diuresis for anasarca. Reports sobriety since 2021, PETH pending. Needs liver clinic follow-up and screening EGD (twin scheduled 3/30/22).      Plan:     1. Await pending labs.   2. Peripheral smear and haptoglobin added.   3. Vit K 5mg IV   4. Continue to trend LFTs  5. Continue lactulose, titrate to 2-3 stools daily.   6. Continue rifaximin 550mg twice daily   7. Outpatient liver clinic follow-up 5/12/22.   8. EGD currently scheduled 3/30/22                                                                           Faviola Leyva PA-C  Thank you for the opportunity to participate in the care of this patient.   Please feel free to call me with any questions or concerns.  Phone number [phone].

## 2022-03-23 NOTE — PLAN OF CARE
Problem: Plan of Care - These are the overarching goals to be used throughout the patient stay.    Goal: Absence of Hospital-Acquired Illness or Injury  Intervention: Identify and Manage Fall Risk  Recent Flowsheet Documentation  Taken 3/23/2022 1400 by Jesika Franklin RN  Safety Promotion/Fall Prevention:   activity supervised   bed alarm on   chair alarm on   nonskid shoes/slippers when out of bed    No falls, but per PT/OT, recommend A1 with walker and alarms.     Problem: Pain Chronic (Persistent)  Goal: Acceptable Pain Control and Functional Ability  Outcome: Ongoing, Progressing  Intervention: Manage Persistent Pain  Recent Flowsheet Documentation  Taken 3/23/2022 1400 by Jesika Franklin RN  Medication Review/Management: medications reviewed    Oxy 5mg PRN given x2 with good relief. Pain management team on board     Problem: Electrolyte Imbalance  Goal: Electrolyte Balance  Outcome: Ongoing, Progressing   Goal Outcome Evaluation:    Mg and K protocols run and replaced

## 2022-03-23 NOTE — PROGRESS NOTES
03/23/22 1134   Quick Adds   Type of Visit Initial PT Evaluation   Living Environment   People in Home sibling(s)  (sister)   Current Living Arrangements house   Living Environment Comments See OT note.   Self-Care   Activity/Exercise/Self-Care Comment See OT note.   General Information   Onset of Illness/Injury or Date of Surgery 03/15/22   Referring Physician Jere Ochoa DO   Patient/Family Therapy Goals Statement (PT) None stated.   Pertinent History of Current Problem (include personal factors and/or comorbidities that impact the POC) Pt admitted with anasarca.   Existing Precautions/Restrictions fall   Range of Motion (ROM)   Range of Motion ROM is WFL   Strength (Manual Muscle Testing)   Strength (Manual Muscle Testing) Deficits observed during functional mobility   Transfers   Transfers sit-stand transfer;toilet transfer   Transfer Safety Concerns Noted losing balance backward;decreased balance during turns;decreased weight-shifting ability   Impairments Contributing to Impaired Transfers impaired balance;decreased strength   Sit-Stand Transfer   Sit-Stand [place] (Transfers) contact guard;verbal cues   Assistive Device (Sit-Stand Transfers) walker, front-wheeled   Toilet Transfer   Type (Toilet Transfer) stand-sit;sit-stand   [place] Level (Toilet Transfer) contact guard;verbal cues   Assistive Device (Toilet Transfer) walker, front-wheeled   Gait/Stairs (Locomotion)   [place] Level (Gait) contact guard;verbal cues   Assistive Device (Gait) walker, front-wheeled   Distance in Feet (Required for LE Total Joints) 375'   Pattern (Gait) step-through   Deviations/Abnormal Patterns (Gait) mtathew decreased;gait speed decreased   Clinical Impression   Criteria for Skilled Therapeutic Intervention Yes, treatment indicated   PT Diagnosis (PT) impaired functional mobility   Influenced by the following impairments impaired balance, decreased strength   Functional limitations due to impairments  difficulty with transfers, ambulation   Clinical Presentation (PT Evaluation Complexity) Evolving/Changing   Clinical Presentation Rationale Pt presents as medically diagnosed.   Clinical Decision Making (Complexity) moderate complexity   Planned Therapy Interventions (PT) balance training;bed mobility training;gait training;home exercise program;neuromuscular re-education;patient/family education;strengthening;transfer training   Risk & Benefits of therapy have been explained evaluation/treatment results reviewed;participants voiced agreement with care plan;participants included;patient   PT Discharge Planning   PT Discharge Recommendation (DC Rec) Transitional Care Facility;home with assist;home with home care physical therapy   PT Rationale for DC Rec Pt unsteady with ambulation, recommend assist of 1 at this time. If patient has this level of assist at home, could discharge home with home PT, otherwise would recommend TCU.   Plan of Care Review   Plan of Care Reviewed With patient   Total Evaluation Time   Total Evaluation Time (Minutes) 10   Physical Therapy Goals   PT Frequency Daily   PT Predicated Duration/Target Date for Goal Attainment 03/30/22   PT Goals Bed Mobility;Transfers;Gait;Stairs   PT: Bed Mobility Supervision/stand-by assist;Supine to/from sit   PT: Transfers Supervision/stand-by assist;Sit to/from stand;Assistive device   PT: Gait Supervision/stand-by assist;Assistive device;Rolling walker;150 feet   PT: Stairs Supervision/stand-by assist;4 stairs;Rail on both sides     Marlin Mcqueen, PT  3/23/2022

## 2022-03-23 NOTE — PROGRESS NOTES
Occupational Therapy     03/23/22 0915   Quick Adds   Type of Visit Initial Occupational Therapy Evaluation  (OT lymph eval already completed; charged tx.)   Living Environment   People in Home sibling(s)  (sister & brother in law-reports they are avail to assist)   Current Living Arrangements house  (stays on main level)   Home Accessibility stairs to enter home   Number of Stairs, Main Entrance 2   Stair Railings, Main Entrance railing on right side (ascending)   Living Environment Comments walk in shower w/grab bars & shower seat.   Self-Care   Usual Activity Tolerance good   Current Activity Tolerance fair   Equipment Currently Used at Home none   Activity/Exercise/Self-Care Comment prior to admit, independent w/ADLs-some shared IADLs-manages own meds, finances & drives; amb w/o AD.   General Information   Onset of Illness/Injury or Date of Surgery 03/15/22   Referring Physician Jere Ochoa   Patient/Family Therapy Goal Statement (OT) none stated   Existing Precautions/Restrictions fall   General Observations and Info Admitted w/anasarca; alcoholic cirrhosis of liver w/ascities.   Cognitive Status Examination   Orientation Status person;place;time  (couldn't recall date-knows month & yr.)   Affect/Mental Status (Cognitive) flat/blunted affect   Follows Commands WNL   Cognitive Status Comments Pt states she doesn't feel like herself-not clear-minded.   Sensory   Sensory Quick Adds No deficits were identified   Pain Assessment   Patient Currently in Pain   (pain in B feet-didn't rate.)   Integumentary/Edema   Integumentary/Edema Comments B LEs wrapped.   Posture   Posture not impaired   Range of Motion Comprehensive   General Range of Motion no range of motion deficits identified   Strength Comprehensive (MMT)   Comment, General Manual Muscle Testing (MMT) Assessment demo functional UE strength, but fatigues.   Coordination   Upper Extremity Coordination No deficits were identified   Fine Motor Coordination    (slight tremors noted B hands)   Transfers   Transfer Comments CGA/min A   Balance   Balance Comments some unsteadiness-CGA w/FWW; pt reports feeling dizzy.   Clinical Impression   Criteria for Skilled Therapeutic Interventions Met (OT) Yes, treatment indicated   OT Diagnosis decreased ADLs   OT Problem List-Impairments impacting ADL activity tolerance impaired;balance;mobility;strength   Assessment of Occupational Performance 3-5 Performance Deficits   Identified Performance Deficits LB drsg, balance, trfs, mobility.   Planned Therapy Interventions (OT) ADL retraining;balance training;transfer training;progressive activity/exercise;home program guidelines   Clinical Decision Making Complexity (OT) moderate complexity   Risk & Benefits of therapy have been explained evaluation/treatment results reviewed;care plan/treatment goals reviewed;patient   OT Discharge Planning   OT Discharge Recommendation (DC Rec) home with assist;Transitional Care Facility  (pending progress & avail assist @ home.)   OT Rationale for DC Rec At this time, pt needing 24 hr supervision & needing CGA/min A of 1 for ADLs; demo weakness & unsteadiness.   Total Evaluation Time (Minutes)   Total Evaluation Time (Minutes) 12

## 2022-03-23 NOTE — PROGRESS NOTES
"[facility] ACUTE PAIN SERVICE    Daily PAIN Progress Note    Assessment/Plan:  Marj Slater is a 61 year old female who was admitted on 3/15/2022.  Pain team was asked to see the patient for complex pain management. Admitted evaluation of abdominal pain, leg edema, weight gain. Patient is jaundice secondary to cirrhosis, and CT showed hepatic cirrhosis and steatosis portal HTN. History including chronic hepatitis C, cirrhosis due to alcohol.  Patient had been having chills a couple of days prior to admission, and some dyspnea, reported had gained about 30 lb recently. Patient is s/p diagnostic and therapeutic paracentesis 3/15/22, 3.7 L removed.  Patient now with hepatic encephalopathy, possible contribution from opioids.  GI consulted, lactulose and rifaximin continued, duplex US ordered, EGD scheduled for 3/30. Describes pain as 5/10 and sore on the left side of tongue, also with BLE and low back pain - aching, sore, \"tight\". Reports loose stools. Also reports \"still feels out of it\" but less confused and agitated. Feels anxious. The patient denies nausea, vomiting, constipation, chest pain, shortness of breath. Diet is regular, Na less than 2400 mg.  The patient does  smoke and has chemical dependency history.        Opioid Induced Respiratory Depression Risk Assessment:?High, opioid naive status, cirrhosis     PLAN:   1) Pain is consistent with tongue pain, low back pain. History of Hepatitis C s/p treatment, cirrhosis, alcohol abuse admitted with LE edema, alcoholic liver cirrhosis with ascites. Last alcohol use October 2021. CT on admission showed hepatic cirrhosis and steatosis, portal hypertension including large volume ascites, splenic enlargement and small caliber upper abdominal varices. Status post diagnostic and therapeutic paracentesis 3/15/22.   Now with with encephalopathic with rising LFTs. Elevated ammonia level on lactulose. On lasix as well.   2)Multimodal Medication Therapy  Topical: " "vicious lidocaine swish and spit, lidocaine ointment tid prn to BLEs and low back   NSAID'S: CrCl 79 ml/min, no NSAIDs due to hepatic function  Muscle Relaxants: denies spasms  Adjuvants: Tylenol 325 mg po q 6 h prn - keep less than 2G, benadryl prn per medical team   Antidepressants/anxiolytics: home dose of sertraline 50 mg daily on hold    Opioids: oxycodone 5 mg po bid prn  IV Pain medication: none  3)Non-medication interventions: ice vs heat  Acupuncture consult, Integrative consult - if patient agreeable   4)Constipation Prophylaxis: lactulose 20 gm tid, Miralax daily  5) Care Teams: [facility], GI     -Opioid prescriber has been none  -[place]  pulled from system on 03/22/22. This indicates not prescribed opioids in time period of last year.   Discharge Recommendations - We recommend prescribing the following at the time of discharge: no opioids    Subjective:  Seen sitting up in a chair. Reports tongue pain improved with lidocaine. Reports mild back pain and BLE pain. She reports still feeling out of it but does answer questions appropriately.      Principal Problem:    Anasarca  Active Problems:    Alcoholic cirrhosis of liver with ascites (H)    Chronic hepatitis C virus infection (H)      Objective:  Vital signs in last 24 hours:  /57 (BP Location: Left arm, Patient Position: Chair, Cuff Size: Adult Regular)   Pulse 76   Temp 97.6  F (36.4  C) (Oral)   Resp 18   Ht 1.651 m (5' 5\")   Wt 95.6 kg (210 lb 11.2 oz)   SpO2 95%   BMI 35.06 kg/m    Weight:   Vitals:    03/18/22 0413 03/19/22 0540 03/20/22 0447 03/21/22 0340   Weight: 103.9 kg (229 lb 1.6 oz) 101.7 kg (224 lb 1.6 oz) 99 kg (218 lb 3.2 oz) 97.8 kg (215 lb 11.2 oz)    03/22/22 0542   Weight: 95.6 kg (210 lb 11.2 oz)      Weight change:   Body mass index is 35.06 kg/m .    Intake/Output last 3 shifts:  I/O last 3 completed shifts:  In: 1320 [P.O.:1320]  Out: 3400 [Urine:3400]  Intake/Output this shift:  I/O this shift:  In: -   Out: 825 " [Urine:825]    Review of Systems:   As per subjective, all others negative.    Physical Exam:  General Appearance:  Alert, cooperative, no distress, up in chair    Head:  Normocephalic, without obvious abnormality, atraumatic   Eyes:  PERRL, conjunctiva/corneas clear, EOM's intact   ENT/Throat: Lips, mucosa, normal; teeth and gums normal, left side of tongue with open area, no erythema    Lymph/Neck: Supple, symmetrical, trachea midline   Lungs:   Clear to auscultation bilaterally, respirations unlabored   Cardiovascular/Heart:  Regular rate and rhythm, S1, S2 normal,no murmur, rub or gallop.    Abdomen:   Soft, non-tender, bowel sounds active all four quadrants,    Musculoskeletal: BLE edema    Skin: Skin warm, dry    Neurologic: Alert and oriented X 3, Moves all 4 extremities        Imaging: Reviewed      Labs: Reviewed   Recent Results (from the past 24 hour(s))   Basic metabolic panel    Collection Time: 03/23/22  7:22 AM   Result Value Ref Range    Sodium 128 (L) 136 - 145 mmol/L    Potassium 3.4 (L) 3.5 - 5.0 mmol/L    Chloride 92 (L) 98 - 107 mmol/L    Carbon Dioxide (CO2) 27 22 - 31 mmol/L    Anion Gap 9 5 - 18 mmol/L    Urea Nitrogen 11 8 - 22 mg/dL    Creatinine 0.78 0.60 - 1.10 mg/dL    Calcium 8.3 (L) 8.5 - 10.5 mg/dL    Glucose 92 70 - 125 mg/dL    GFR Estimate 85 >60 mL/min/1.73m2   Hepatic function panel    Collection Time: 03/23/22  7:22 AM   Result Value Ref Range    Bilirubin Total 9.8 (H) 0.0 - 1.0 mg/dL    Bilirubin Direct 3.1 (H) <=0.5 mg/dL    Protein Total 7.6 6.0 - 8.0 g/dL    Albumin 2.6 (L) 3.5 - 5.0 g/dL    Alkaline Phosphatase 112 45 - 120 U/L     (H) 0 - 40 U/L    ALT 82 (H) 0 - 45 U/L   Magnesium    Collection Time: 03/23/22  7:22 AM   Result Value Ref Range    Magnesium 1.7 (L) 1.8 - 2.6 mg/dL   CBC with platelets and differential    Collection Time: 03/23/22  7:22 AM   Result Value Ref Range    WBC Count 9.6 4.0 - 11.0 10e3/uL    RBC Count 2.88 (L) 3.80 - 5.20 10e6/uL     Hemoglobin 9.7 (L) 11.7 - 15.7 g/dL    Hematocrit 28.3 (L) 35.0 - 47.0 %    MCV 98 78 - 100 fL    MCH 33.7 (H) 26.5 - 33.0 pg    MCHC 34.3 31.5 - 36.5 g/dL    RDW 16.3 (H) 10.0 - 15.0 %    Platelet Count 113 (L) 150 - 450 10e3/uL    % Neutrophils 67 %    % Lymphocytes 14 %    % Monocytes 16 %    % Eosinophils 1 %    % Basophils 1 %    % Immature Granulocytes 1 %    NRBCs per 100 WBC 0 <1 /100    Absolute Neutrophils 6.6 1.6 - 8.3 10e3/uL    Absolute Lymphocytes 1.3 0.8 - 5.3 10e3/uL    Absolute Monocytes 1.5 (H) 0.0 - 1.3 10e3/uL    Absolute Eosinophils 0.1 0.0 - 0.7 10e3/uL    Absolute Basophils 0.1 0.0 - 0.2 10e3/uL    Absolute Immature Granulocytes 0.1 <=0.4 10e3/uL    Absolute NRBCs 0.0 10e3/uL   Reticulocyte count    Collection Time: 03/23/22  7:22 AM   Result Value Ref Range    % Reticulocyte 4.9 (H) 0.8 - 2.7 %    Absolute Reticulocyte 0.141 (H) 0.010 - 0.110 10e6/uL   INR    Collection Time: 03/23/22  7:56 AM   Result Value Ref Range    INR 2.43 (H) 0.90 - 1.15       Total time spent 15 minutes with greater than 50% in consultation, education and coordination of care, examination of patient, review of medical record, lab and imaging results, completion of documentation, and discussion with RN, MD, Senior Acupuncturist, and pharmacist.      FIGUEROA LottP-C  Acute Care Pain Management Program  [facility] ([place], [place], [place])  Monday-Friday 8a-4p   Page via online [email or web address] or call [phone]

## 2022-03-23 NOTE — PLAN OF CARE
Problem: Pain Chronic (Persistent)  Goal: Acceptable Pain Control and Functional Ability  Outcome: Ongoing, Progressing  Intervention: Develop Pain Management Plan  Recent Flowsheet Documentation  Taken 3/23/2022 0018 by Gwen Manzo, RN  Pain Management Interventions: medication (see MAR)  Intervention: Manage Persistent Pain  Recent Flowsheet Documentation  Taken 3/23/2022 0018 by Gwen Manzo, RN  Medication Review/Management: medications reviewed   Goal Outcome Evaluation:      PRN Tylenol for mouth sore. No other complains. Voiding ok. Stand by assist.

## 2022-03-23 NOTE — PROGRESS NOTES
Pt c/o pain on inside of mouth, left side. States she thinks she might have bit the side of her mouth. Lidocaine swish and spit helping this.     Pt alert and oriented x 4. Forgetful and flat at times.     CT of head completed. VSS.

## 2022-03-23 NOTE — PLAN OF CARE
"Goal Outcome Evaluation:      Problem: Plan of Care - These are the overarching goals to be used throughout the patient stay.    Goal: Plan of Care Review/Shift Note  Description: The Plan of Care Review/Shift note should be completed every shift.  The Outcome Evaluation is a brief statement about your assessment that the patient is improving, declining, or no change.  This information will be displayed automatically on your shift note.  Outcome: Ongoing, Progressing  Goal: Patient-Specific Goal (Individualized)  Description: You can add care plan individualizations to a care plan. Examples of Individualization might be:  \"Parent requests to be called daily at 9am for status\", \"I have a hard time hearing out of my right ear\", or \"Do not touch me to wake me up as it startles me\".  Outcome: Ongoing, Progressing  Goal: Absence of Hospital-Acquired Illness or Injury  Outcome: Ongoing, Progressing  Intervention: Identify and Manage Fall Risk  Recent Flowsheet Documentation  Taken 3/22/2022 1647 by Nancy Colindres RN  Safety Promotion/Fall Prevention:   activity supervised   assistive device/personal items within reach   bed alarm on   chair alarm on  Intervention: Prevent Skin Injury  Recent Flowsheet Documentation  Taken 3/22/2022 1647 by Nancy Colindres RN  Body Position: position changed independently  Intervention: Prevent and Manage VTE (Venous Thromboembolism) Risk  Recent Flowsheet Documentation  Taken 3/22/2022 1647 by Nancy Colindres RN  Activity Management:   activity adjusted per tolerance   activity encouraged   ambulated to bathroom  Goal: Optimal Comfort and Wellbeing  Outcome: Ongoing, Progressing  Intervention: Monitor Pain and Promote Comfort  Recent Flowsheet Documentation  Taken 3/22/2022 1647 by Nancy Colindres RN  Pain Management Interventions: medication (see MAR)  Goal: Readiness for Transition of Care  Outcome: Ongoing, Progressing     Problem: Chest Pain  Goal: Resolution of Chest Pain Symptoms  Outcome: " Ongoing, Progressing     Problem: Fall Injury Risk  Goal: Absence of Fall and Fall-Related Injury  Outcome: Ongoing, Progressing  Intervention: Identify and Manage Contributors  Recent Flowsheet Documentation  Taken 3/22/2022 1647 by Nancy Colindres RN  Medication Review/Management: medications reviewed  Intervention: Promote Injury-Free Environment  Recent Flowsheet Documentation  Taken 3/22/2022 1647 by Nancy Colindres, RN  Safety Promotion/Fall Prevention:   activity supervised   assistive device/personal items within reach   bed alarm on   chair alarm on     Problem: Fluid Volume Excess  Goal: Fluid Balance  Outcome: Ongoing, Progressing     Problem: Pain Chronic (Persistent)  Goal: Acceptable Pain Control and Functional Ability  Outcome: Ongoing, Progressing  Intervention: Develop Pain Management Plan  Recent Flowsheet Documentation  Taken 3/22/2022 1647 by Nancy Colindres RN  Pain Management Interventions: medication (see MAR)  Intervention: Manage Persistent Pain  Recent Flowsheet Documentation  Taken 3/22/2022 1647 by Nancy Colindres, RN  Medication Review/Management: medications reviewed     Problem: Electrolyte Imbalance  Goal: Electrolyte Balance  Outcome: Ongoing, Progressing

## 2022-03-24 ENCOUNTER — APPOINTMENT (OUTPATIENT)
Dept: OCCUPATIONAL THERAPY | Facility: HOSPITAL | Age: 62
DRG: 432 | End: 2022-03-24
Payer: COMMERCIAL

## 2022-03-24 ENCOUNTER — APPOINTMENT (OUTPATIENT)
Dept: PHYSICAL THERAPY | Facility: HOSPITAL | Age: 62
DRG: 432 | End: 2022-03-24
Payer: COMMERCIAL

## 2022-03-24 LAB
ALBUMIN SERPL-MCNC: 2.4 G/DL (ref 3.5–5)
ALP SERPL-CCNC: 104 U/L (ref 45–120)
ALT SERPL W P-5'-P-CCNC: 79 U/L (ref 0–45)
ANION GAP SERPL CALCULATED.3IONS-SCNC: 7 MMOL/L (ref 5–18)
AST SERPL W P-5'-P-CCNC: 194 U/L (ref 0–40)
BILIRUB SERPL-MCNC: 8.9 MG/DL (ref 0–1)
BUN SERPL-MCNC: 10 MG/DL (ref 8–22)
CALCIUM SERPL-MCNC: 8.2 MG/DL (ref 8.5–10.5)
CHLORIDE BLD-SCNC: 94 MMOL/L (ref 98–107)
CO2 SERPL-SCNC: 30 MMOL/L (ref 22–31)
CREAT SERPL-MCNC: 0.87 MG/DL (ref 0.6–1.1)
EBV DNA # SPEC NAA+PROBE: <500 COPIES/ML
EBV DNA SPEC NAA+PROBE-LOG#: <2.7 {LOG_COPIES}/ML
GFR SERPL CREATININE-BSD FRML MDRD: 74 ML/MIN/1.73M2
GLUCOSE BLD-MCNC: 84 MG/DL (ref 70–125)
INR PPP: 2.42 (ref 0.85–1.15)
OXYCODONE UR CFM-MCNC: 2120 NG/ML
OXYCODONE/CREAT UR: 2865 NG/MG {CREAT}
OXYMORPHONE UR CFM-MCNC: 318 NG/ML
OXYMORPHONE/CREAT UR: 430 NG/MG {CREAT}
PATH REPORT.COMMENTS IMP SPEC: NORMAL
PATH REPORT.COMMENTS IMP SPEC: NORMAL
PATH REPORT.FINAL DX SPEC: NORMAL
PATH REPORT.MICROSCOPIC SPEC OTHER STN: NORMAL
PATH REPORT.MICROSCOPIC SPEC OTHER STN: NORMAL
POTASSIUM BLD-SCNC: 3.7 MMOL/L (ref 3.5–5)
PROT SERPL-MCNC: 7.2 G/DL (ref 6–8)
SODIUM SERPL-SCNC: 131 MMOL/L (ref 136–145)
VIT B1 PYROPHOSHATE BLD-SCNC: 77 NMOL/L

## 2022-03-24 PROCEDURE — 120N000001 HC R&B MED SURG/OB

## 2022-03-24 PROCEDURE — 97530 THERAPEUTIC ACTIVITIES: CPT | Mod: GP

## 2022-03-24 PROCEDURE — 250N000013 HC RX MED GY IP 250 OP 250 PS 637: Performed by: NURSE PRACTITIONER

## 2022-03-24 PROCEDURE — 250N000009 HC RX 250: Performed by: NURSE PRACTITIONER

## 2022-03-24 PROCEDURE — 97535 SELF CARE MNGMENT TRAINING: CPT | Mod: GO

## 2022-03-24 PROCEDURE — 250N000013 HC RX MED GY IP 250 OP 250 PS 637: Performed by: PHYSICIAN ASSISTANT

## 2022-03-24 PROCEDURE — 36415 COLL VENOUS BLD VENIPUNCTURE: CPT | Performed by: PHYSICIAN ASSISTANT

## 2022-03-24 PROCEDURE — 97116 GAIT TRAINING THERAPY: CPT | Mod: GP

## 2022-03-24 PROCEDURE — 97530 THERAPEUTIC ACTIVITIES: CPT | Mod: GO

## 2022-03-24 PROCEDURE — 82040 ASSAY OF SERUM ALBUMIN: CPT | Performed by: PHYSICIAN ASSISTANT

## 2022-03-24 PROCEDURE — 250N000013 HC RX MED GY IP 250 OP 250 PS 637: Performed by: INTERNAL MEDICINE

## 2022-03-24 PROCEDURE — 85060 BLOOD SMEAR INTERPRETATION: CPT | Performed by: PATHOLOGY

## 2022-03-24 PROCEDURE — 97110 THERAPEUTIC EXERCISES: CPT | Mod: GP

## 2022-03-24 PROCEDURE — 99232 SBSQ HOSP IP/OBS MODERATE 35: CPT | Performed by: NURSE PRACTITIONER

## 2022-03-24 PROCEDURE — 250N000013 HC RX MED GY IP 250 OP 250 PS 637: Performed by: HOSPITALIST

## 2022-03-24 PROCEDURE — 80053 COMPREHEN METABOLIC PANEL: CPT | Performed by: PHYSICIAN ASSISTANT

## 2022-03-24 PROCEDURE — 99233 SBSQ HOSP IP/OBS HIGH 50: CPT | Performed by: INTERNAL MEDICINE

## 2022-03-24 PROCEDURE — 250N000011 HC RX IP 250 OP 636: Performed by: INTERNAL MEDICINE

## 2022-03-24 PROCEDURE — 36415 COLL VENOUS BLD VENIPUNCTURE: CPT | Performed by: INTERNAL MEDICINE

## 2022-03-24 PROCEDURE — 85610 PROTHROMBIN TIME: CPT | Performed by: INTERNAL MEDICINE

## 2022-03-24 RX ORDER — FUROSEMIDE 20 MG
40 TABLET ORAL
Status: DISCONTINUED | OUTPATIENT
Start: 2022-03-25 | End: 2022-03-28 | Stop reason: HOSPADM

## 2022-03-24 RX ORDER — POTASSIUM CHLORIDE 1500 MG/1
20 TABLET, EXTENDED RELEASE ORAL ONCE
Status: COMPLETED | OUTPATIENT
Start: 2022-03-24 | End: 2022-03-24

## 2022-03-24 RX ADMIN — OXYCODONE HYDROCHLORIDE 5 MG: 5 TABLET ORAL at 08:51

## 2022-03-24 RX ADMIN — LIDOCAINE HYDROCHLORIDE 5 ML: 20 SOLUTION ORAL; TOPICAL at 13:07

## 2022-03-24 RX ADMIN — POLYETHYLENE GLYCOL 3350 17 G: 17 POWDER, FOR SOLUTION ORAL at 08:43

## 2022-03-24 RX ADMIN — RIFAXIMIN 550 MG: 550 TABLET ORAL at 21:25

## 2022-03-24 RX ADMIN — OXYCODONE HYDROCHLORIDE 5 MG: 5 TABLET ORAL at 21:25

## 2022-03-24 RX ADMIN — FUROSEMIDE 40 MG: 10 INJECTION, SOLUTION INTRAMUSCULAR; INTRAVENOUS at 04:50

## 2022-03-24 RX ADMIN — ACETAMINOPHEN 325 MG: 325 TABLET ORAL at 03:10

## 2022-03-24 RX ADMIN — RIFAXIMIN 550 MG: 550 TABLET ORAL at 08:43

## 2022-03-24 RX ADMIN — SPIRONOLACTONE 100 MG: 25 TABLET, FILM COATED ORAL at 08:43

## 2022-03-24 RX ADMIN — LACTULOSE 20 G: 10 SOLUTION ORAL at 08:43

## 2022-03-24 RX ADMIN — LIDOCAINE HYDROCHLORIDE 5 ML: 20 SOLUTION ORAL; TOPICAL at 21:24

## 2022-03-24 RX ADMIN — POTASSIUM CHLORIDE 20 MEQ: 1500 TABLET, EXTENDED RELEASE ORAL at 08:43

## 2022-03-24 RX ADMIN — LIDOCAINE HYDROCHLORIDE 5 ML: 20 SOLUTION ORAL; TOPICAL at 17:15

## 2022-03-24 RX ADMIN — LIDOCAINE HYDROCHLORIDE 5 ML: 20 SOLUTION ORAL; TOPICAL at 08:43

## 2022-03-24 RX ADMIN — FUROSEMIDE 40 MG: 10 INJECTION, SOLUTION INTRAMUSCULAR; INTRAVENOUS at 13:07

## 2022-03-24 RX ADMIN — MAGNESIUM OXIDE TAB 400 MG (241.3 MG ELEMENTAL MG) 400 MG: 400 (241.3 MG) TAB at 08:43

## 2022-03-24 RX ADMIN — MAGNESIUM OXIDE TAB 400 MG (241.3 MG ELEMENTAL MG) 400 MG: 400 (241.3 MG) TAB at 21:25

## 2022-03-24 ASSESSMENT — ACTIVITIES OF DAILY LIVING (ADL)
ADLS_ACUITY_SCORE: 8

## 2022-03-24 NOTE — PLAN OF CARE
Problem: Plan of Care - These are the overarching goals to be used throughout the patient stay.    Goal: Plan of Care Review/Shift Note  Description: The Plan of Care Review/Shift note should be completed every shift.  The Outcome Evaluation is a brief statement about your assessment that the patient is improving, declining, or no change.  This information will be displayed automatically on your shift note.  Outcome: Ongoing, Progressing  Flowsheets (Taken 3/24/2022 1317)  Plan of Care Reviewed With: patient   Goal Outcome Evaluation:    Plan of Care Reviewed With: patient      A/O however forgetful and confused at times. Bed alarm and chair alarm in use. Pt does set off alarms occasionally.     VSS    C/o generalized pain, medicated with prn oxycodone x 1 with good relief. Mouth pain being managed with lidocaine swish and spit. Pain service following.    Up with SBA/Walker, seems to be off balance frequently.    Voiding well. BM x 5. Lactulose given this AM, will need to be held this evening per parameters.     Lymphedema wraps in place.

## 2022-03-24 NOTE — PLAN OF CARE
Problem: Fluid Volume Excess  Goal: Fluid Balance  Outcome: Ongoing, Progressing     Problem: Pain Chronic (Persistent)  Goal: Acceptable Pain Control and Functional Ability  3/24/2022 0638 by Gwen Manzo RN  Outcome: Ongoing, Progressing  3/24/2022 0511 by Gwen Manzo RN  Outcome: Ongoing, Progressing  Intervention: Manage Persistent Pain  Recent Flowsheet Documentation  Taken 3/24/2022 0000 by Gwen Manzo RN  Medication Review/Management: medications reviewed     Problem: Plan of Care - These are the overarching goals to be used throughout the patient stay.    Goal: Absence of Hospital-Acquired Illness or Injury  Intervention: Identify and Manage Fall Risk  Recent Flowsheet Documentation  Taken 3/24/2022 0000 by Gwen Manzo RN  Safety Promotion/Fall Prevention: activity supervised  Intervention: Prevent Skin Injury  Recent Flowsheet Documentation  Taken 3/24/2022 0000 by Gwen Manzo RN  Body Position: position changed independently  Intervention: Prevent and Manage VTE (Venous Thromboembolism) Risk  Recent Flowsheet Documentation  Taken 3/24/2022 0000 by Gwen Manzo RN  Activity Management: activity adjusted per tolerance   Goal Outcome Evaluation:    Patient alert and oriented but forgetful, not using call light before getting up, unsteady. Voiding ok.   PRN tylenol and oxycodone x 1 time due to generalized body pain.

## 2022-03-24 NOTE — PROGRESS NOTES
[facility]    Medicine Progress Note - Hospitalist Service    Date of Admission:  3/15/2022    Assessment & Plan          Marj Slater is a 61 year old female with history of liver cirrhosis presented to emergency department for evaluation of abdominal pain, increased girth, leg edema and weight gain.     Alcoholic liver cirrhosis with ascites: CT showed hepatic cirrhosis and steatosis portal hypertension including large volume ascites, mild splenic enlargement and small caliber upper abdominal varices  History of hepatitis C treated last year, last alcohol use October 2021  Status post diagnostic and therapeutic paracentesis 3/15, 3.7 L removed.   - Continue furosemide and spironolactone. Change Lasix to oral.  - Intake and output.  - Daily weight: has lost 34lb since admission.  - Ascites analysis shows no SBP. No indication for antibiotics.   - EGD on 3/30 as scheduled with [facility] outpatient    Acute metabolic encephalopathy: Concerning for hepatic encephalopathy and possible contribution of narcotics. CT head negative. Now improved.  - Continue lactulose. Titrate for bowel movement 2-3 daily  - Continue Rifaximin  - Continue to hold Zoloft  - Minimize narcotics.   - Follow up pain team recommendations.    Bilateral leg swelling: US shows no DVT. Diuresis as above. Compression stocking.     Coagulopathy: due to liver cirrhosis. No signs of bleeding. Monitor. Vitamin K as indicated    Chronic anemia: Hemoglobin stable. No signs of acute blood loss, monitor      Chronic thrombocytopenia: due to liver cirrhosis. Platelet count is relatively stable. Monitor     Hypokalemia: Replace per protocol    Hypomagnesemia: replace and recheck     Tobacco use disorder: Encouraged complete cessation     Obesity: BMI 39.26       Diet: Combination Diet Low Saturated Fat Na <2400mg Diet  Room Service    DVT Prophylaxis: anticoagulation contraindicated due to coagulopathy. No SCD due to bilateral leg  swelling  Henderson Catheter: Not present  Central Lines: None  Cardiac Monitoring: None  Code Status: Full Code      Disposition Plan   Expected Discharge: 03/25/2022     Anticipated discharge location: home         The patient's care was discussed with the Bedside Nurse, Care Coordinator/ and Patient and her sister    Talia Swanson MD  Hospitalist Service  [facility]  Securely message with the Vocera Web Console (learn more here)  Text page via [employer] Paging/Directory     ______________________________________________________________________    Interval History   Patient reports that she continues to feel off balance and weak. She also feels anxious about his medical condition in general.      Data reviewed today: I reviewed all medications, new labs and imaging results over the last 24 hours.    Physical Exam   Vital Signs: Temp: 98  F (36.7  C) Temp src: Oral BP: 129/57 Pulse: 84   Resp: 20 SpO2: 95 % O2 Device: None (Room air)    Weight: 207 lbs 9.6 oz    General appearance: not in acute distress  HEENT: PERRL, EOMI  Lungs: Clear breath sounds in bilateral lung fields  Cardiovascular: Regular rate and rhythm, normal S1-S2  Abdomen: Soft, non tender, no distension  Musculoskeletal: No joint swelling  Skin: No rash. Bilateral lower leg pitting edema, improved from prior.   Neurology: AAO ×3.  Cranial nerves II - XII normal.  Normal muscle strength in all four extremities.    Data   Recent Labs   Lab 03/24/22  0811 03/24/22  0707 03/23/22  1653 03/23/22  0756 03/23/22  0722 03/22/22  0519 03/21/22  0942 03/21/22  0830 03/21/22  0536 03/20/22  0531   WBC  --   --   --   --  9.6  --   --  9.7  --  7.6   HGB  --   --   --   --  9.7*  --   --  10.0*  --  9.8*   MCV  --   --   --   --  98  --   --  98  --  100   PLT  --   --   --   --  113*  --   --  144*  --  137*   INR 2.42*  --   --  2.43*  --  2.96*   < >  --   --   --    NA  --  131*  --   --  128* 130*  --   --    < > 134*    POTASSIUM  --  3.7 3.7  --  3.4* 3.8  --   --    < > 3.6   CHLORIDE  --  94*  --   --  92* 93*  --   --    < > 93*   CO2  --  30  --   --  27 29  --   --    < > 27   BUN  --  10  --   --  11 11  --   --    < > 10   CR  --  0.87  --   --  0.78 0.83  --   --    < > 0.88   ANIONGAP  --  7  --   --  9 8  --   --    < > 14   DAWIT  --  8.2*  --   --  8.3* 8.0*  --   --    < > 8.4*   GLC  --  84  --   --  92 98  --   --    < > 78   ALBUMIN  --  2.4*  --   --  2.6* 2.4*  --   --    < >  --    PROTTOTAL  --  7.2  --   --  7.6 7.0  --   --    < >  --    BILITOTAL  --  8.9*  --   --  9.8* 9.1*  --   --    < >  --    ALKPHOS  --  104  --   --  112 100  --   --    < >  --    ALT  --  79*  --   --  82* 72*  --   --    < >  --    AST  --  194*  --   --  232* 214*  --   --    < >  --     < > = values in this interval not displayed.

## 2022-03-24 NOTE — PLAN OF CARE
Generalized pain, 8/10. Unable to given PRN Oxy yet, PRN Tylenol given with effect. 3 Bms today, lactulose held. Bed/chair alarms in place, pt forgetful and not steady to walk without staff. Sister visited and was updated. Discharge to TCU once placement secured.

## 2022-03-24 NOTE — PROGRESS NOTES
[facility] ACUTE PAIN SERVICE    Daily PAIN Progress Note    Assessment/Plan:  Marj Slater is a 61 year old female who was admitted on 3/15/2022.  Pain team was asked to see the patient for complex pain management. Admitted evaluation of abdominal pain, leg edema, weight gain. Patient is jaundice secondary to cirrhosis, and CT showed hepatic cirrhosis and steatosis portal HTN. History including chronic hepatitis C, cirrhosis due to alcohol.  Patient is s/p diagnostic and therapeutic paracentesis 3/15/22, 3.7 L removed.  noted throughout hospitalization patient with hepatic encephalopathy. There was concerns that opioids contributed to confusion and pain team was consulted. GI consulted, lactulose and rifaximin continued. Describes pain as 6-7/10 and sore on the left side of tongue, also with BLE and low back pain. Reports loose stools. The patient denies nausea, vomiting, constipation, chest pain, shortness of breath. The patient does  smoke and has chemical dependency history.        Opioid Induced Respiratory Depression Risk Assessment:?High, opioid naive status, cirrhosis     PLAN:   1) Pain is consistent with tongue pain, low back pain. History of Hepatitis C s/p treatment, cirrhosis, alcohol abuse admitted with LE edema, alcoholic liver cirrhosis with ascites. Last alcohol use October 2021. CT on admission showed hepatic cirrhosis and steatosis, portal hypertension including large volume ascites, splenic enlargement and abdominal varices. Status post diagnostic and therapeutic paracentesis 3/15/22.   Elevated LFTs. Elevated ammonia level on lactulose. On lasix as well.   2)Multimodal Medication Therapy  Topical: vicious lidocaine swish and spit, lidocaine ointment tid prn to BLEs and low back   NSAID'S: CrCl 79 ml/min, no NSAIDs due to hepatic function  Muscle Relaxants: denies spasms  Adjuvants: Tylenol 325 mg po q 6 h prn - keep less than 2G, benadryl prn per medical team  "  Antidepressants/anxiolytics: home dose of sertraline 50 mg daily on hold    Opioids: oxycodone 5 mg po bid prn - continue to taper off   IV Pain medication: none  3)Non-medication interventions: ice vs heat  Acupuncture consult, Integrative consult - if patient agreeable   4)Constipation Prophylaxis: lactulose 20 gm tid, Miralax daily  5) Care Teams: HMS, GI     -Opioid prescriber has been none  -MN  pulled from system on 03/22/22. This indicates not prescribed opioids in time period of last year.   Discharge Recommendations - We recommend prescribing the following at the time of discharge: no opioids, lidocaine mouth wash, EGD 3/30/22, Liver clinic follow up, follow up Primary Care Provider     Subjective:  Seen sitting up in a chair. Eating breakfast. Reports feeling better but still feels \"foggy\" mentally. Reports pain in abdomen, low back and legs improving. Reports tongue pain improved with lidocaine.     Principal Problem:    Anasarca  Active Problems:    Alcoholic cirrhosis of liver with ascites (H)    Chronic hepatitis C virus infection (H)      Objective:  Vital signs in last 24 hours:  /53 (BP Location: Right arm)   Pulse 84   Temp 98.3  F (36.8  C) (Oral)   Resp 22   Ht 1.651 m (5' 5\")   Wt 94.2 kg (207 lb 9.6 oz)   SpO2 91%   BMI 34.55 kg/m    Weight:     Vitals:    03/20/22 0447 03/21/22 0340 03/22/22 0542 03/23/22 2157   Weight: 99 kg (218 lb 3.2 oz) 97.8 kg (215 lb 11.2 oz) 95.6 kg (210 lb 11.2 oz) 95.6 kg (210 lb 11.2 oz)    03/24/22 0501   Weight: 94.2 kg (207 lb 9.6 oz)      Weight change:   Body mass index is 34.55 kg/m .    Intake/Output last 3 shifts:  I/O last 3 completed shifts:  In: 360 [P.O.:360]  Out: 1875 [Urine:1875]  Intake/Output this shift:  No intake/output data recorded.    Review of Systems:   As per subjective, all others negative.    Physical Exam:  General Appearance:  Alert, cooperative, no distress, up in chair, eating breakfast   Head:  Normocephalic, without " obvious abnormality, atraumatic   Eyes:  PERRL, conjunctiva/corneas clear, EOM's intact   ENT/Throat: Lips, mucosa, normal; teeth and gums normal, left side of tongue with open area, no erythema    Lymph/Neck: Supple, symmetrical, trachea midline   Lungs:   Clear to auscultation bilaterally, respirations unlabored   Cardiovascular/Heart:  Regular rate and rhythm, S1, S2 normal,no murmur, rub or gallop.    Abdomen:   Soft, non-tender, bowel sounds active all four quadrants,    Musculoskeletal: BLE edema legs with wraps on them    Skin: Skin warm, dry    Neurologic: Alert and oriented X 3, Moves all 4 extremities        Imaging: Reviewed      Labs: Reviewed   Recent Results (from the past 24 hour(s))   Extra Purple Top Tube    Collection Time: 03/23/22  4:07 PM   Result Value Ref Range    Hold Specimen JIC    Potassium    Collection Time: 03/23/22  4:53 PM   Result Value Ref Range    Potassium 3.7 3.5 - 5.0 mmol/L   Asymptomatic COVID-19 Virus (Coronavirus) by PCR Nasopharyngeal    Collection Time: 03/23/22  8:42 PM    Specimen: Nasopharyngeal; Swab   Result Value Ref Range    SARS CoV2 PCR Negative Negative   Comprehensive metabolic panel    Collection Time: 03/24/22  7:07 AM   Result Value Ref Range    Sodium 131 (L) 136 - 145 mmol/L    Potassium 3.7 3.5 - 5.0 mmol/L    Chloride 94 (L) 98 - 107 mmol/L    Carbon Dioxide (CO2) 30 22 - 31 mmol/L    Anion Gap 7 5 - 18 mmol/L    Urea Nitrogen 10 8 - 22 mg/dL    Creatinine 0.87 0.60 - 1.10 mg/dL    Calcium 8.2 (L) 8.5 - 10.5 mg/dL    Glucose 84 70 - 125 mg/dL    Alkaline Phosphatase 104 45 - 120 U/L     (H) 0 - 40 U/L    ALT 79 (H) 0 - 45 U/L    Protein Total 7.2 6.0 - 8.0 g/dL    Albumin 2.4 (L) 3.5 - 5.0 g/dL    Bilirubin Total 8.9 (H) 0.0 - 1.0 mg/dL    GFR Estimate 74 >60 mL/min/1.73m2   INR    Collection Time: 03/24/22  8:11 AM   Result Value Ref Range    INR 2.42 (H) 0.85 - 1.15       Total time spent 25 minutes with greater than 50% in consultation,  education and coordination of care, examination of patient, review of medical record, lab and imaging results, completion of documentation, and discussion with RN, MD, Senior Acupuncturist, and pharmacist.      KETAN Lott-C  Acute Care Pain Management Program  [facility] ([place], [place], [place])  Monday-Friday 8a-4p   Page via online paging system or call [phone]

## 2022-03-24 NOTE — PROGRESS NOTES
11:20AM - CHW received delegation from Meagan Handy RN CM, to follow up on TCU referrals.    [facility] - Spoke with Annmarie in admissions. Declined due to no appropriate bed.    [facility] Thomson - Left message on admissions voicemail to call back to [phone].    Updated destination comments on Care Management tab.    ERIBERTO Arndt  Inpatient Community Health Worker  [facility], P1 & P2

## 2022-03-24 NOTE — PROGRESS NOTES
"GI PROGRESS NOTE  3/21/2022  Marj Ramireznn  1960  /-92    Subjective:   Feeling a bit better today. Still forgetful.      Objective:     Blood pressure 118/53, pulse 84, temperature 98.3  F (36.8  C), temperature source Oral, resp. rate 22, height 1.651 m (5' 5\"), weight 94.2 kg (207 lb 9.6 oz), SpO2 91 %.    Body mass index is 34.55 kg/m .  Gen: NAD  Cardio: RRR  GI: Non-distended, BS positive, soft, diffuse tenderness throughout  Neuro: Alert, orientated x4. No asterixis.     Laboratory:  BMP  Recent Labs   Lab 03/24/22  0707 03/23/22  1653 03/23/22  0722 03/22/22  0519   *  --  128* 130*   POTASSIUM 3.7 3.7 3.4* 3.8   CHLORIDE 94*  --  92* 93*   DAWIT 8.2*  --  8.3* 8.0*   CO2 30  --  27 29   BUN 10  --  11 11   CR 0.87  --  0.78 0.83   GLC 84  --  92 98     CBC  Recent Labs   Lab 03/23/22  0722 03/21/22  0830 03/20/22  0531   WBC 9.6 9.7 7.6   RBC 2.88* 2.99* 2.94*   HGB 9.7* 10.0* 9.8*   HCT 28.3* 29.3* 29.3*   MCV 98 98 100   MCH 33.7* 33.4* 33.3*   MCHC 34.3 34.1 33.4   RDW 16.3* 15.9* 15.9*   * 144* 137*     INR  Recent Labs   Lab 03/24/22  0811 03/23/22  0756 03/22/22  0519   INR 2.42* 2.43* 2.96*      LFTs  Recent Labs   Lab Test 03/24/22  0707 03/23/22  0722 03/22/22  0519 03/21/22  1327 03/15/22  1712 03/15/22  1623   ALBUMIN 2.4* 2.6* 2.4*  --    < >  --    BILITOTAL 8.9* 9.8* 9.1*  --    < >  --    ALT 79* 82* 72*  --    < >  --    * 232* 214*  --    < >  --    PROTEIN  --   --   --  Negative  --  30 *    < > = values in this interval not displayed.     Imaging:  EXAM: US ABDOMEN LIMITED WITH DOPPLER COMPLETE  LOCATION: [facility]  DATE/TIME: 3/21/2022 1:53 PM     INDICATION: HIGH VOLUME paracentesis with or without diagnostic fluid analysis with labs to be drawn if ordered. Total paracentesis volume as much as possible.  COMPARISON: 3/15/2022 CT  TECHNIQUE: Complete abdominal ultrasound. Color flow with spectral Doppler and waveform analysis " performed.     FINDINGS:  GALLBLADDER: Single gallstone. Upper normal wall thickness of 3 mm.   BILE DUCTS: No biliary dilatation. The common duct measures 6 mm.  LIVER: Coarsened echotexture, suggesting underlying fibrosis. Nodular contour. No focal mass.   RIGHT KIDNEY: Normal size. Normal echogenicity with no hydronephrosis or mass.   LEFT KIDNEY: Normal size. No hydronephrosis.  SPLEEN: Enlarged measuring 13.5 cm craniocaudal  PANCREAS: The visualized portions are normal.  AORTA: Normal in caliber.  IVC: Normal where visualized.  Trace ascites.     ABDOMINAL DUPLEX: The hepatic artery, right and middle hepatic veins, IVC, portal veins, and splenic vein are patent with flow in the normal direction. The left hepatic vein could not be visualized.                                       IMPRESSION:  1.  Cirrhosis.  2.  Portal hypertension with splenomegaly and ascites.  3.  Cholelithiasis.  4.  Left hepatic vein not visualized, otherwise normal abdominal liver duplex.    1. PARACENTESIS  2. ULTRASOUND GUIDANCE  LOCATION: [facility]  DATE/TIME: 3/15/2022 8:37 PM     INDICATION: Ascites.     PROCEDURE: Informed consent obtained. Time out performed. The abdomen was prepped and draped in a sterile fashion. 10 mL of 1% lidocaine was infused into local soft tissues. A 5 Lao catheter system was introduced into the abdominal ascites under   ultrasound guidance.     3.7 liters of clear fluid were removed and sent to the lab.     Patient tolerated procedure well.  Ultrasound imaging was obtained and placed in the patient's permanent medical record.                                             IMPRESSION:  1.  Status post ultrasound-guided paracentesis.    EXAM: CT ABDOMEN PELVIS W CONTRAST  LOCATION: [facility]  DATE/TIME: 3/15/2022 6:26 PM                                 IMPRESSION:   1.  Hepatic cirrhosis and steatosis portal hypertension including large volume ascites, mild  splenic enlargement and small caliber upper abdominal varices.  2.  Cholelithiasis.     Assessment:   61 year-old female with h/o Hepatitis C s/p treatment, cirrhosis, alcohol abuse admitted with LE edema. Now she is encephalopathic with rising LFTs.     1. Elevated LFTs  2. Encephalopathy  Cause unclear. ? EBV infection which can lead to hemolysis (haptoglobin low, elevated indirect bili, smear pending). EBV results still pending. Ammonia elevated. Now on lactulose and rifaximin. Was on narcotics from pain control, but now these have been held, pain team consulted. Extensive work-up otherwise normal/unremarkable. UA normal. Duplex US negative for thrombus or other abnormality. No ascites on US. Previous tap negative for SBP. WBC normal. Folate, Vit B12 normal. CMV negative. PETH normal. Acetaminophen level normal. Blood cultures, C. Diff PCR negative. Hep C antibody positive as expected, but Hep B and A negative. Head CT without acute changes.     LFTs have improved today. We have considered liver bx if LFTs do not improve and underlying cause remains unclear. However, due to elevated INR, this would need to be transjugular. INR 2.43 today.      3. Cirrhosis with ascites  MELD 29. Undergoing diuresis for anasarca. Reports sobriety since 2021, PETH pending. Needs liver clinic follow-up and screening EGD (twni scheduled 3/30/22).      Plan:     1. Await EBV, peripheral smear  2. Continue to trend LFTs  3. Continue lactulose, titrate to 2-3 stools daily.   4. Continue rifaximin 550mg twice daily   5. Outpatient liver clinic follow-up 5/12/22.   6. EGD currently scheduled 3/30/22                                                                           Faviola Leyva PA-C  Thank you for the opportunity to participate in the care of this patient.   Please feel free to call me with any questions or concerns.  Phone number [phone].

## 2022-03-24 NOTE — PROGRESS NOTES
Care Management Follow Up    Length of Stay (days): 9    Expected Discharge Date: 03/24/2022 pending accepting TCU bed     Concerns to be Addressed: accepting TCU bed      Patient plan of care discussed at interdisciplinary rounds: Yes    Anticipated Discharge Disposition: TCU     Anticipated Discharge Services:  TCU  Anticipated Discharge DME: not at this time      Education Provided on the Discharge Plan:  Per team  Patient/Family in Agreement with the Plan: yes     Referrals Placed by CM/SW: pending TCU   Private pay costs discussed: not at this time    Additional Information:  Chart reviewed. Requested that Trisha Luke follow up on pending TCU referrals.    TCU updates:  [facility]- no beds    [facility]2 TCU updates:  [facility]-declined, no appropriate beds  [facility] Thomson-message was left, awaiting for call back        Meagan Handy RN

## 2022-03-25 ENCOUNTER — APPOINTMENT (OUTPATIENT)
Dept: OCCUPATIONAL THERAPY | Facility: HOSPITAL | Age: 62
DRG: 432 | End: 2022-03-25
Payer: COMMERCIAL

## 2022-03-25 LAB
ALBUMIN SERPL-MCNC: 2.6 G/DL (ref 3.5–5)
ALP SERPL-CCNC: 110 U/L (ref 45–120)
ALT SERPL W P-5'-P-CCNC: 90 U/L (ref 0–45)
ANION GAP SERPL CALCULATED.3IONS-SCNC: 11 MMOL/L (ref 5–18)
AST SERPL W P-5'-P-CCNC: 193 U/L (ref 0–40)
BASOPHILS # BLD MANUAL: 0.1 10E3/UL (ref 0–0.2)
BASOPHILS NFR BLD MANUAL: 1 %
BILIRUB DIRECT SERPL-MCNC: 1.6 MG/DL
BILIRUB SERPL-MCNC: 9.2 MG/DL (ref 0–1)
BUN SERPL-MCNC: 8 MG/DL (ref 8–22)
CALCIUM SERPL-MCNC: 8.7 MG/DL (ref 8.5–10.5)
CHLORIDE BLD-SCNC: 96 MMOL/L (ref 98–107)
CO2 SERPL-SCNC: 22 MMOL/L (ref 22–31)
CREAT SERPL-MCNC: 0.72 MG/DL (ref 0.6–1.1)
DAT, ANTI-IGG, C3: ABNORMAL
DAT, ANTI-IGG: ABNORMAL
EOSINOPHIL # BLD MANUAL: 0.1 10E3/UL (ref 0–0.7)
EOSINOPHIL NFR BLD MANUAL: 1 %
ERYTHROCYTE [DISTWIDTH] IN BLOOD BY AUTOMATED COUNT: 17 % (ref 10–15)
GFR SERPL CREATININE-BSD FRML MDRD: >90 ML/MIN/1.73M2
GLUCOSE BLD-MCNC: 84 MG/DL (ref 70–125)
HCT VFR BLD AUTO: 29.1 % (ref 35–47)
HGB BLD-MCNC: 9.6 G/DL (ref 11.7–15.7)
INR PPP: 2.25 (ref 0.85–1.15)
LDH SERPL L TO P-CCNC: 694 U/L (ref 125–220)
LYMPHOCYTES # BLD MANUAL: 0.9 10E3/UL (ref 0.8–5.3)
LYMPHOCYTES NFR BLD MANUAL: 9 %
MAGNESIUM SERPL-MCNC: 1.8 MG/DL (ref 1.8–2.6)
MCH RBC QN AUTO: 33.8 PG (ref 26.5–33)
MCHC RBC AUTO-ENTMCNC: 33 G/DL (ref 31.5–36.5)
MCV RBC AUTO: 103 FL (ref 78–100)
MONOCYTES # BLD MANUAL: 1.7 10E3/UL (ref 0–1.3)
MONOCYTES NFR BLD MANUAL: 18 %
NEUTROPHILS # BLD MANUAL: 6.8 10E3/UL (ref 1.6–8.3)
NEUTROPHILS NFR BLD MANUAL: 71 %
PLAT MORPH BLD: ABNORMAL
PLATELET # BLD AUTO: 137 10E3/UL (ref 150–450)
POTASSIUM BLD-SCNC: 4.1 MMOL/L (ref 3.5–5)
PROT SERPL-MCNC: 8.1 G/DL (ref 6–8)
RBC # BLD AUTO: 2.84 10E6/UL (ref 3.8–5.2)
RBC MORPH BLD: ABNORMAL
RETICS # AUTO: 0.13 10E6/UL (ref 0.01–0.11)
RETICS/RBC NFR AUTO: 4.5 % (ref 0.8–2.7)
SODIUM SERPL-SCNC: 129 MMOL/L (ref 136–145)
SPECIMEN EXPIRATION DATE: ABNORMAL
SPECIMEN EXPIRATION DATE: ABNORMAL
WBC # BLD AUTO: 9.6 10E3/UL (ref 4–11)

## 2022-03-25 PROCEDURE — 250N000013 HC RX MED GY IP 250 OP 250 PS 637: Performed by: HOSPITALIST

## 2022-03-25 PROCEDURE — 82248 BILIRUBIN DIRECT: CPT | Performed by: INTERNAL MEDICINE

## 2022-03-25 PROCEDURE — 250N000013 HC RX MED GY IP 250 OP 250 PS 637: Performed by: INTERNAL MEDICINE

## 2022-03-25 PROCEDURE — 97535 SELF CARE MNGMENT TRAINING: CPT | Mod: GO

## 2022-03-25 PROCEDURE — 83615 LACTATE (LD) (LDH) ENZYME: CPT | Performed by: INTERNAL MEDICINE

## 2022-03-25 PROCEDURE — 250N000009 HC RX 250: Performed by: NURSE PRACTITIONER

## 2022-03-25 PROCEDURE — 85610 PROTHROMBIN TIME: CPT | Performed by: PHYSICIAN ASSISTANT

## 2022-03-25 PROCEDURE — 36415 COLL VENOUS BLD VENIPUNCTURE: CPT | Performed by: INTERNAL MEDICINE

## 2022-03-25 PROCEDURE — 250N000013 HC RX MED GY IP 250 OP 250 PS 637: Performed by: PHYSICIAN ASSISTANT

## 2022-03-25 PROCEDURE — 86880 COOMBS TEST DIRECT: CPT | Performed by: INTERNAL MEDICINE

## 2022-03-25 PROCEDURE — 85045 AUTOMATED RETICULOCYTE COUNT: CPT | Performed by: INTERNAL MEDICINE

## 2022-03-25 PROCEDURE — 99222 1ST HOSP IP/OBS MODERATE 55: CPT | Performed by: INTERNAL MEDICINE

## 2022-03-25 PROCEDURE — 250N000011 HC RX IP 250 OP 636: Performed by: INTERNAL MEDICINE

## 2022-03-25 PROCEDURE — 258N000003 HC RX IP 258 OP 636: Performed by: PHYSICIAN ASSISTANT

## 2022-03-25 PROCEDURE — 80053 COMPREHEN METABOLIC PANEL: CPT | Performed by: PHYSICIAN ASSISTANT

## 2022-03-25 PROCEDURE — 85027 COMPLETE CBC AUTOMATED: CPT | Performed by: INTERNAL MEDICINE

## 2022-03-25 PROCEDURE — 99232 SBSQ HOSP IP/OBS MODERATE 35: CPT | Performed by: NURSE PRACTITIONER

## 2022-03-25 PROCEDURE — 120N000001 HC R&B MED SURG/OB

## 2022-03-25 PROCEDURE — 36415 COLL VENOUS BLD VENIPUNCTURE: CPT | Performed by: PHYSICIAN ASSISTANT

## 2022-03-25 PROCEDURE — 83735 ASSAY OF MAGNESIUM: CPT | Performed by: INTERNAL MEDICINE

## 2022-03-25 PROCEDURE — 97129 THER IVNTJ 1ST 15 MIN: CPT | Mod: GO

## 2022-03-25 PROCEDURE — 250N000011 HC RX IP 250 OP 636: Performed by: PHYSICIAN ASSISTANT

## 2022-03-25 PROCEDURE — 99233 SBSQ HOSP IP/OBS HIGH 50: CPT | Performed by: INTERNAL MEDICINE

## 2022-03-25 PROCEDURE — 250N000013 HC RX MED GY IP 250 OP 250 PS 637: Performed by: STUDENT IN AN ORGANIZED HEALTH CARE EDUCATION/TRAINING PROGRAM

## 2022-03-25 RX ORDER — LANOLIN ALCOHOL/MO/W.PET/CERES
3 CREAM (GRAM) TOPICAL AT BEDTIME
Status: DISCONTINUED | OUTPATIENT
Start: 2022-03-25 | End: 2022-03-28 | Stop reason: HOSPADM

## 2022-03-25 RX ORDER — MAGNESIUM SULFATE HEPTAHYDRATE 40 MG/ML
2 INJECTION, SOLUTION INTRAVENOUS ONCE
Status: COMPLETED | OUTPATIENT
Start: 2022-03-25 | End: 2022-03-25

## 2022-03-25 RX ORDER — TRAZODONE HYDROCHLORIDE 50 MG/1
50 TABLET, FILM COATED ORAL
Status: DISCONTINUED | OUTPATIENT
Start: 2022-03-25 | End: 2022-03-28 | Stop reason: HOSPADM

## 2022-03-25 RX ORDER — LACTULOSE 10 G/15ML
20 SOLUTION ORAL DAILY
Status: DISCONTINUED | OUTPATIENT
Start: 2022-03-26 | End: 2022-03-28

## 2022-03-25 RX ORDER — MULTIVITAMIN,THERAPEUTIC
1 TABLET ORAL DAILY
Status: DISCONTINUED | OUTPATIENT
Start: 2022-03-25 | End: 2022-03-28 | Stop reason: HOSPADM

## 2022-03-25 RX ADMIN — ACETAMINOPHEN 325 MG: 325 TABLET ORAL at 15:50

## 2022-03-25 RX ADMIN — POLYETHYLENE GLYCOL 3350 17 G: 17 POWDER, FOR SOLUTION ORAL at 08:28

## 2022-03-25 RX ADMIN — MAGNESIUM SULFATE HEPTAHYDRATE 2 G: 40 INJECTION, SOLUTION INTRAVENOUS at 11:44

## 2022-03-25 RX ADMIN — PHYTONADIONE 5 MG: 10 INJECTION, EMULSION INTRAMUSCULAR; INTRAVENOUS; SUBCUTANEOUS at 12:38

## 2022-03-25 RX ADMIN — LIDOCAINE HYDROCHLORIDE 5 ML: 20 SOLUTION ORAL; TOPICAL at 08:12

## 2022-03-25 RX ADMIN — MELATONIN TAB 3 MG 3 MG: 3 TAB at 20:59

## 2022-03-25 RX ADMIN — FUROSEMIDE 40 MG: 20 TABLET ORAL at 08:11

## 2022-03-25 RX ADMIN — RIFAXIMIN 550 MG: 550 TABLET ORAL at 08:11

## 2022-03-25 RX ADMIN — MELATONIN TAB 3 MG 3 MG: 3 TAB at 00:16

## 2022-03-25 RX ADMIN — LACTULOSE 20 G: 10 SOLUTION ORAL at 08:12

## 2022-03-25 RX ADMIN — ACETAMINOPHEN 325 MG: 325 TABLET ORAL at 00:16

## 2022-03-25 RX ADMIN — SPIRONOLACTONE 100 MG: 25 TABLET, FILM COATED ORAL at 08:11

## 2022-03-25 RX ADMIN — LIDOCAINE HYDROCHLORIDE 5 ML: 20 SOLUTION ORAL; TOPICAL at 20:58

## 2022-03-25 RX ADMIN — TRAZODONE HYDROCHLORIDE 50 MG: 50 TABLET ORAL at 02:54

## 2022-03-25 RX ADMIN — FUROSEMIDE 40 MG: 20 TABLET ORAL at 19:06

## 2022-03-25 RX ADMIN — LIDOCAINE HYDROCHLORIDE 5 ML: 20 SOLUTION ORAL; TOPICAL at 13:46

## 2022-03-25 RX ADMIN — THERA TABS 1 TABLET: TAB at 12:40

## 2022-03-25 RX ADMIN — LIDOCAINE HYDROCHLORIDE 5 ML: 20 SOLUTION ORAL; TOPICAL at 16:48

## 2022-03-25 RX ADMIN — RIFAXIMIN 550 MG: 550 TABLET ORAL at 20:59

## 2022-03-25 ASSESSMENT — ACTIVITIES OF DAILY LIVING (ADL)
ADLS_ACUITY_SCORE: 8
ADLS_ACUITY_SCORE: 10
ADLS_ACUITY_SCORE: 8
ADLS_ACUITY_SCORE: 10
ADLS_ACUITY_SCORE: 8
ADLS_ACUITY_SCORE: 12
ADLS_ACUITY_SCORE: 8

## 2022-03-25 NOTE — PLAN OF CARE
[id number]:  Problem: Plan of Care - These are the overarching goals to be used throughout the patient stay.    Goal: Optimal Comfort and Wellbeing  Outcome: Ongoing, Progressing     Problem: Pain Chronic (Persistent)  Goal: Acceptable Pain Control and Functional Ability  Outcome: Ongoing, Progressing        Goal Outcome Evaluation:      C/O increased generalized pain around HS. PRN oxycodone given x1; effective.   Confused, alert to self overnight. Increased restlessness and frequent urge to void. PRN melatonin and tylenol given with little to no effect. Notified HO, x1 trazodone PO ordered. Declined UA at this time. Slept around 1.5-2 hours total overnight.

## 2022-03-25 NOTE — CONSULTS
[facility] Inpatient Consult Note    Patient: Marj Slater  MRN: [id number]  Date of Service: 3/25/2022        REASON FOR CONSULTATION:  Low haptoglobin in setting of liver cirrhosis  Evaluation for hemolysis        Assessment  Low haptoglobin and mild elevated LDH in the setting of liver cirrhosis  Peripheral smear is showing some spherocytosis without any evidence of TMA.  LDH was only mildly up at 304.  With liver cirrhosis it is expected for her to have low haptoglobin due to decreased production capacity.  This is also evident by the fact that she has low albumin and elevated INR.  Liver disease can also result in significant increase in spur cells which are fragile and spurr cell anemia is commonly seen in severe liver disease.  She also has some EBV copies in the blood and cold agglutinin production from EBV infection is not uncommon and can cause some mild hemolysis.  The fact that her hemoglobin has remained rather stable speaks against clinically significant hemolysis.  She only has mildly elevated reticulocyte count which again does not correlate with clinically significant hemolysis.  LDH can elevated in acute liver injury.  However presence of spherocytes in the peripheral blood does indicate potential for underlying low-grade autoimmune process.  She has not had any recent blood transfusions.  Prior type and screen was negative for any alloantibodies.  No recent IV antibiotic use.    I will check RADHA today along with repeat LDH and CBC with reticulocyte count.  I will also send for urine hemosiderin which is usually seen in intravascular hemolysis, although we suspect, if anything, she may have extravascular hemolysis due to enlarged spleen.  Depending on the RADHA test results we can order further testing to look for cold agglutinins, if need be.  Even if she were to have an auto antibody, considering her current clinical situation and stable hemoglobin we would probably  hold off on any treatment.  There is no need for any intervention from our side at this point in time.  We will wait for the results to come back to give further recommendations.    Cirrhosis  GI and medicine teams are on board.  Continue care per their recommendation    Plan:      Staging History    Cancer Staging  No matching staging information was found for the patient.      History  Ms. Marj Slater is a 61 year old female with longstanding history of liver cirrhosis who has been consulted by the internal medicine team to evaluate for possible hemolysis.    Patient has a history of liver cirrhosis probably secondary to chronic hep C and alcohol abuse.  Hospitalized on 3/15/2022 after she presented with abdominal pain, anasarca and dyspnea on exertion.  CT scan of the abdomen showed liver cirrhosis and portal hypertension with large volume ascites and splenomegaly.  He underwent paracentesis with removal of 14-1/2 L of yellowish ascitic fluid.  She was noted to have mild anemia and thrombocytopenia which was attributed to her liver cirrhosis and splenomegaly.  She has had elevated bilirubin for long time now and there was slight bump in the total bilirubin at the time of admission.  There was no evidence of any biliary duct obstruction on CT scan.  She also had evidence of hepatic encephalopathy and is on lactulose and rifaximin.      Over the last few days her bili has been up in 8-9 range.  Most of it looks like is indirect bilirubin.  Direct bili on 3/22/2022 was 2.8 and currently is at 1.6.  Total bili today was 9.2.  Hemoglobin has been rather stable.  It was 10.1 the time of admission and couple days ago it was 9.7.  She denies having any cola colored urine.  Also in the past she has had some dark urine.  Interestingly she does have some EBV copies in her blood (<500), no evidence of CMV.  Blood cultures have been negative.  Haptoglobin was checked due to increase in the indirect bilirubin which came  "back showing low level at less than 8.  LDH was mildly elevated at 304.  Peripheral smear showed some spherocytes.  No evidence of schistocytes.  Peripheral smear also showed some reactive monocytes.  We have been consulted to rule out underlying hemolysis.    She is feeling relatively okay.  Does have some occasional abdominal pain.  Denies any headache.  Denies any excessive bruising of the body although she does have some bruises at the site of venipunctures.  Denies any fevers.  Denies any recent history of viral infections.  She does not remember whether she had mono in the past.    Review of systems.    A 14 point review of systems was obtained.  Positive findings noted in the history.  Rest of the review of system is otherwise negative.      Past History  Past Medical History:   Diagnosis Date     Chronic hepatitis C (H)      History of pneumonectomy      History reviewed. No pertinent surgical history.  History reviewed. No pertinent family history.  Social History     Socioeconomic History     Marital status: Single     Spouse name: None     Number of children: None     Years of education: None     Highest education level: None   Occupational History     None   Tobacco Use     Smoking status: Current Every Day Smoker     Packs/day: 0.50     Smokeless tobacco: None   Substance and Sexual Activity     Alcohol use: None     Drug use: None     Sexual activity: None   Other Topics Concern     None   Social History Narrative     None     Social Determinants of Health     Financial Resource Strain: Not on file   Food Insecurity: Not on file   Transportation Needs: Not on file   Physical Activity: Not on file   Stress: Not on file   Social Connections: Not on file   Intimate Partner Violence: Not on file   Housing Stability: Not on file       Allergies    No Known Allergies       Physical Exam    /49 (BP Location: Right arm)   Pulse 76   Temp 98  F (36.7  C) (Oral)   Resp 16   Ht 1.651 m (5' 5\")   Wt " 94.2 kg (207 lb 9.6 oz)   SpO2 95%   BMI 34.55 kg/m      GENERAL: Alert, cooperative, no distress    HEAD: Atraumatic and normocephalic.    EYES: MEGAN, EOMI. scleral icterus present    Oral cavity: no mucosal lesion    NECK: supple, mild submandibular adenopathy    LYMPH NODES: No palpable, cervical, axillary or inguinal lymphadenopathy.     CHEST: clear to auscultation bilaterally. Symmetrical breath movements bilaterally.    CVS: S1 and S2 are Regular rate and rhythm. No murmur or gallop or rub heard.  Bilateral pedal edema up to knees    ABDOMEN: Soft, nontender, no distention, hepatosplenomegaly could not be appreciated fully as deep palpation was not allowed by the patient    EXTREMITIES: Warm.    NEUROLOGICAL: Alert and oriented x3    SKIN: Bruising noted at the venipuncture sites, no other rashes seen      Lab Results  Recent Results (from the past 24 hour(s))   Magnesium    Collection Time: 03/25/22  7:39 AM   Result Value Ref Range    Magnesium 1.8 1.8 - 2.6 mg/dL   INR    Collection Time: 03/25/22  7:39 AM   Result Value Ref Range    INR 2.25 (H) 0.85 - 1.15   Comprehensive metabolic panel    Collection Time: 03/25/22  7:39 AM   Result Value Ref Range    Sodium 129 (L) 136 - 145 mmol/L    Potassium 4.1 3.5 - 5.0 mmol/L    Chloride 96 (L) 98 - 107 mmol/L    Carbon Dioxide (CO2) 22 22 - 31 mmol/L    Anion Gap 11 5 - 18 mmol/L    Urea Nitrogen 8 8 - 22 mg/dL    Creatinine 0.72 0.60 - 1.10 mg/dL    Calcium 8.7 8.5 - 10.5 mg/dL    Glucose 84 70 - 125 mg/dL    Alkaline Phosphatase 110 45 - 120 U/L     (H) 0 - 40 U/L    ALT 90 (H) 0 - 45 U/L    Protein Total 8.1 (H) 6.0 - 8.0 g/dL    Albumin 2.6 (L) 3.5 - 5.0 g/dL    Bilirubin Total 9.2 (H) 0.0 - 1.0 mg/dL    GFR Estimate >90 >60 mL/min/1.73m2   Bilirubin direct    Collection Time: 03/25/22  7:39 AM   Result Value Ref Range    Bilirubin Direct 1.6 (H) <=0.5 mg/dL        Imaging Results    CT Head w/o Contrast    Result Date: 3/22/2022  EXAM: CT HEAD W/O  CONTRAST LOCATION: [facility] DATE/TIME: 3/22/2022 6:46 PM INDICATION: Confusion, hepatic encephalopathy COMPARISON: 10/31/2021 head CT TECHNIQUE: Routine CT Head without IV contrast. Multiplanar reformats. Dose reduction techniques were used. FINDINGS: INTRACRANIAL CONTENTS: No intracranial hemorrhage, extraaxial collection, or mass effect.  No CT evidence of acute infarct. Small focus of hypoattenuation within the left hemipons may represent a chronic pontine  infarction. Normal parenchymal attenuation. Normal ventricles and sulci. VISUALIZED ORBITS/SINUSES/MASTOIDS: No intraorbital abnormality. No paranasal sinus mucosal disease. No middle ear or mastoid effusion. BONES/SOFT TISSUES: No acute abnormality.     IMPRESSION: 1.  No acute intracranial process.    US Abdomen Limited w Abd/Pelvis Duplex Complete    Result Date: 3/21/2022  EXAM: US ABDOMEN LIMITED WITH DOPPLER COMPLETE LOCATION: [facility] DATE/TIME: 3/21/2022 1:53 PM INDICATION: HIGH VOLUME paracentesis with or without diagnostic fluid analysis with labs to be drawn if ordered. Total paracentesis volume as much as possible. COMPARISON: 3/15/2022 CT TECHNIQUE: Complete abdominal ultrasound. Color flow with spectral Doppler and waveform analysis performed.  FINDINGS: GALLBLADDER: Single gallstone. Upper normal wall thickness of 3 mm. BILE DUCTS: No biliary dilatation. The common duct measures 6 mm. LIVER: Coarsened echotexture, suggesting underlying fibrosis. Nodular contour. No focal mass. RIGHT KIDNEY: Normal size. Normal echogenicity with no hydronephrosis or mass. LEFT KIDNEY: Normal size. No hydronephrosis. SPLEEN: Enlarged measuring 13.5 cm craniocaudal PANCREAS: The visualized portions are normal. AORTA: Normal in caliber. IVC: Normal where visualized. Trace ascites. ABDOMINAL DUPLEX: The hepatic artery, right and middle hepatic veins, IVC, portal veins, and splenic vein are patent with  flow in the normal direction. The left hepatic vein could not be visualized.     IMPRESSION: 1.  Cirrhosis. 2.  Portal hypertension with splenomegaly and ascites. 3.  Cholelithiasis. 4.  Left hepatic vein not visualized, otherwise normal abdominal liver duplex.        A total of 50 minutes was spent on this consult including face-to-face conversation with the patient, review of EMR, care coordination and counseling, and medical documentation.    Signed by: Urban Duarte MD

## 2022-03-25 NOTE — PROGRESS NOTES
"CLINICAL NUTRITION SERVICES - REASSESSMENT NOTE     Nutrition Prescription    RECOMMENDATIONS FOR MDs/PROVIDERS TO ORDER:  None    Malnutrition Status:    Does not meet 2 criteria for malnutrition    Recommendations already ordered by Registered Dietitian (RD):  mvi with minerals  Magic cup bid    Future/Additional Recommendations:  Follow po intake and nutritional needs     EVALUATION OF THE PROGRESS TOWARD GOALS   Diet: Low Saturated Fat/2400 mg Sodium    Intake: Fair, improved.   Only 1 meal/day documented past 2 days, 50% of supper last night and 75% of breakfast day prior.   Ate 50-75% x 3 meals 3/22 with estimated intake 1124 kcal, 32 g protein    Pt ordering 3 full meals/day    Meeting 65% of estimated kcal and 40% of estimated protein needs    Pt restless in room. Family at bedside. Discussed current intake. Family felt pt eating </= 50%. \"She is choosing this\". Pt willing to try supplements \"do what ever you need to, I am willing to try anything\". Pt continues to c/o mouth sore     ANTHROPOMETRICS  Height: 165.1 cm (5' 5\")  Admit: (107 kg) 235 lb  Most Recent Weight: 94.1 kg (207 lb 9.6  Oz) 3/21, trending down from admit with diuresis  95.6 kg (210 lb 11.2 oz)    IBW: 56.8 kg (125 lb)  BMI: Obesity Grade II BMI 35-39.9  Weight History:   Wt Readings from Last 10 Encounters:   03/24/22  3/14/22 94.2 kg (207 lb 9.6 oz)  109.3 kg (241 lb)   10/31/21 100.2 kg (221 lb)   Weight down in part with diuresis. 6.3% weight loss x 5 months    PHYSICAL FINDINGS  See malnutrition section below.  +1 generalized, +2 LE edema    MALNUTRITION:  % Weight Loss:  Weight loss does not meet criteria for malnutrition   % Intake: decreased intake does not meet criteria for malnutrition  Subcutaneous Fat Loss:  None observed  Muscle Loss:  None observed  Fluid Retention:  Trace to mild     Malnutrition Diagnosis: Patient does not meet two of the above criteria necessary for diagnosing malnutrition    GI CONCERNS  GI discomfort " (gas)  4 soft/watery BM/day on lactulose    LABS  Reviewed:   Na 129, decreased    MEDICATIONS  Reviewed: lasix bid, lactulose daily, miralax daily, spironolactone    CURRENT NUTRITION DIAGNOSIS  Inadequate oral intake related to mouth sore as evidenced by ate 0% meals on 3/21  - improving    INTERVENTIONS  Implementation  Trial magic cup BID  Recommend mvi with minerals     Goals  Patient to consume % of nutritionally adequate meals three times per day, or the equivalent with supplements/snacks.- not met    Monitoring/Evaluation  Progress toward goals will be monitored and evaluated per protocol.

## 2022-03-25 NOTE — PLAN OF CARE
Problem: Pain Acute  Goal: Acceptable Pain Control and Functional Ability  Outcome: Ongoing, Progressing  Intervention: Develop Pain Management Plan  Recent Flowsheet Documentation  Taken 3/25/2022 1144 by Jorge Luis Umana, RN  Pain Management Interventions:   declines   emotional support  Taken 3/25/2022 0800 by Jorge Luis Umana, RN  Pain Management Interventions: medication (see MAR)  Intervention: Prevent or Manage Pain  Recent Flowsheet Documentation  Taken 3/25/2022 0800 by Jorge Luis Umana, RN  Medication Review/Management: medications reviewed   Patient c/o pain to left throat. Scheduled Lidocaine swish and spit given with some relief. Tylenol offered but declined.      Problem: Electrolyte Imbalance  Goal: Electrolyte Balance  Outcome: Ongoing, Progressing   Mag 1.8 and K 4.1 with recheck in the A.M .     -Loose stool X 2 this shift. Lactulose decreased to daily. Patient confused this shift per sister. Provider updated   -Hematology consult  -1200 ml FR

## 2022-03-25 NOTE — PROGRESS NOTES
[facility]    Medicine Progress Note - Hospitalist Service    Date of Admission:  3/15/2022    Assessment & Plan          Marj Slater is a 61 year old female with history of liver cirrhosis presented to emergency department for evaluation of abdominal pain, increased girth, leg edema and weight gain.     Alcoholic liver cirrhosis with ascites: CT showed hepatic cirrhosis and steatosis portal hypertension including large volume ascites, mild splenic enlargement and small caliber upper abdominal varices  History of hepatitis C treated last year, last alcohol use October 2021  Status post diagnostic and therapeutic paracentesis 3/15, 3.7 L removed.   - Continue furosemide and spironolactone. Change Lasix to oral on 3/24.  - Decrease lactulose to 20 g daily. Titrate for bowel movement 2-3 daily  - Continue Rifaximin  - Intake and output and daily weight: has lost 34lb since admission.  - Ascites analysis shows no SBP. No indication for antibiotics.   - EGD on 3/30 as scheduled with [facility] outpatient    Acute metabolic encephalopathy: Concerning for hepatic encephalopathy and possible contribution of narcotics. CT head negative. Has mildly elevated ammonia and now improved.  - Continue lactulose and rifaximin as above  - Continue to hold Zoloft  - Minimize narcotics.   - Insomnia worsens her encephalopathy. Start scheduled melatonin at bedtime, trazodone prn.     Bilateral leg swelling: US shows no DVT. The swelling has significantly improved.  - Continue diuresis as above. Compression stocking.     Elevated LFT, hemolysis: total bilirubin elevated to 9.2 today, with elevated indirect bilirubin, LDH and low haptoglobin, concerning mild hemolytic anemia. EBV elevated at low titer. The EB virus infection may explain her enlarged submandibular lymph node and neck pain.   - Hematology consult    Coagulopathy: due to liver cirrhosis. No signs of bleeding. Monitor. Vitamin K as indicated. Got 5 mg  today.     Chronic anemia: Hemoglobin stable. Monitor      Chronic thrombocytopenia: due to liver cirrhosis. Platelet count is relatively stable. Monitor     Hypokalemia: Replace per protocol    Hypomagnesemia: replace and recheck    Hyponatremia: sodium stable at 128-130. Due to liver cirrhosis. Monitor.     Tobacco use disorder: Encouraged complete cessation     Obesity: BMI 39.26       Diet: Combination Diet Low Saturated Fat Na <2400mg Diet  Room Service  Snacks/Supplements Adult: Magic Cup; With Meals  Fluid restriction 1200 ML FLUID    DVT Prophylaxis: anticoagulation contraindicated due to coagulopathy. No SCD due to bilateral leg swelling  Henderson Catheter: Not present  Central Lines: None  Cardiac Monitoring: None  Code Status: Full Code      Disposition Plan   Expected Discharge: 03/28/2022     Anticipated discharge location: home         The patient's care was discussed with the Bedside Nurse, Care Coordinator/ and Patient and her sister, brother.    Talia Swanson MD  Hospitalist Service  [facility]  Securely message with the Vocera Web Console (learn more here)  Text page via [employer] Paging/Directory     ______________________________________________________________________    Interval History   Overnight event noted. Her sister and brother were by the bedside this morning. Patient reports not feeling well since she did not sleep well last night. She also reports having pain from the left side of her neck and left side of the jaw. She continues to feel weak. She reports having 6-8 small bowel movements for the past two days.     Data reviewed today: I reviewed all medications, new labs and imaging results over the last 24 hours.    Physical Exam   Vital Signs: Temp: 98  F (36.7  C) Temp src: Oral BP: 116/49 Pulse: 76   Resp: 16 SpO2: 95 % O2 Device: None (Room air)    Weight: 207 lbs 9.6 oz    General appearance: not in acute distress  HEENT: PERRL, EOMI. She has  enlarged submandibular lymph node on the left and right with tenderness to palpation. Has poor dentition of left upper teeth without gum swelling.   Lungs: Clear breath sounds in bilateral lung fields  Cardiovascular: Regular rate and rhythm, normal S1-S2  Abdomen: Soft, non tender, no distension  Musculoskeletal: No joint swelling  Skin: No rash. Bilateral lower leg pitting edema, improved from prior.   Neurology: AAO ×3.  Cranial nerves II - XII normal.  Normal muscle strength in all four extremities.    Data   Recent Labs   Lab 03/25/22  0739 03/24/22  0811 03/24/22  0707 03/23/22  1653 03/23/22  0756 03/23/22  0722 03/21/22  0942 03/21/22  0830 03/21/22  0536 03/20/22  0531   WBC  --   --   --   --   --  9.6  --  9.7  --  7.6   HGB  --   --   --   --   --  9.7*  --  10.0*  --  9.8*   MCV  --   --   --   --   --  98  --  98  --  100   PLT  --   --   --   --   --  113*  --  144*  --  137*   INR 2.25* 2.42*  --   --  2.43*  --    < >  --   --   --    *  --  131*  --   --  128*   < >  --    < > 134*   POTASSIUM 4.1  --  3.7 3.7  --  3.4*   < >  --    < > 3.6   CHLORIDE 96*  --  94*  --   --  92*   < >  --    < > 93*   CO2 22  --  30  --   --  27   < >  --    < > 27   BUN 8  --  10  --   --  11   < >  --    < > 10   CR 0.72  --  0.87  --   --  0.78   < >  --    < > 0.88   ANIONGAP 11  --  7  --   --  9   < >  --    < > 14   DAWIT 8.7  --  8.2*  --   --  8.3*   < >  --    < > 8.4*   GLC 84  --  84  --   --  92   < >  --    < > 78   ALBUMIN 2.6*  --  2.4*  --   --  2.6*   < >  --    < >  --    PROTTOTAL 8.1*  --  7.2  --   --  7.6   < >  --    < >  --    BILITOTAL 9.2*  --  8.9*  --   --  9.8*   < >  --    < >  --    ALKPHOS 110  --  104  --   --  112   < >  --    < >  --    ALT 90*  --  79*  --   --  82*   < >  --    < >  --    *  --  194*  --   --  232*   < >  --    < >  --     < > = values in this interval not displayed.

## 2022-03-25 NOTE — PROGRESS NOTES
[facility] ACUTE PAIN SERVICE    Daily PAIN Progress Note    Assessment/Plan:  Marj Slater is a 61 year old female who was admitted on 3/15/2022.  Pain team was asked to see the patient for complex pain management. Admitted evaluation of abdominal pain, leg edema, weight gain. Patient is jaundice secondary to cirrhosis, and CT showed hepatic cirrhosis and steatosis portal HTN. History including chronic hepatitis C, cirrhosis due to alcohol.  Patient is s/p diagnostic and therapeutic paracentesis 3/15/22, 3.7 L removed.  Noted throughout hospitalization patient with hepatic encephalopathy. There was concerns that opioids contributed to confusion and pain team was consulted. GI consulted, lactulose and rifaximin continued. Describes pain as 4-6/10 and sore on the left side of tongue, denies abdomen, back and BLE pain. Reports loose stools and poor sleep. The patient does  smoke and has chemical dependency history.        Opioid Induced Respiratory Depression Risk Assessment:?High, opioid naive status, cirrhosis     PLAN:   1) Pain is consistent with tongue pain, low back pain. Do not recommend opioids. Only on topicals and minimal APAP prn. Medical management per Hospital Medicine Service, GI and now Hematology consulted per GI. History of Hepatitis C s/p treatment, cirrhosis, alcohol abuse admitted with LE edema, alcoholic liver cirrhosis with ascites. Last alcohol use October 2021. CT on admission showed hepatic cirrhosis and steatosis, portal hypertension including large volume ascites, splenic enlargement and abdominal varices. Status post diagnostic and therapeutic paracentesis 3/15/22. Monitor LFTs. Elevated ammonia level on lactulose. On lasix as well.    2)Multimodal Medication Therapy  Topical: vicious lidocaine swish and spit, lidocaine ointment tid prn to BLEs and low back   NSAID'S: CrCl 79 ml/min, no NSAIDs due to hepatic function  Muscle Relaxants: denies spasms  Adjuvants: Tylenol 325 mg po q  "8 h prn - keep less than 2G, benadryl discontinue given confusion.   Antidepressants/anxiolytics: home dose of sertraline 50 mg daily on hold    Opioids: discontinue   IV Pain medication: none  3)Non-medication interventions: ice vs heat  Acupuncture consult, Integrative consult - if patient agreeable   4)Constipation Prophylaxis: lactulose 20 gm tid, Miralax daily  5) Care Teams: [facility], GI     -Opioid prescriber has been none  -MN  pulled from system on 03/22/22. This indicates not prescribed opioids in time period of last year.   Discharge Recommendations - We recommend prescribing the following at the time of discharge: no opioids, lidocaine mouth wash, EGD 3/30/22, Liver clinic follow up, follow up Primary Care Provider     Pain team will sign off.     Subjective:  Seen sitting up in a chair. Reports left tongue and left throat pain. More confused today. Did not recognize her sister this morning. Did not sleep well overnight. Does feel lidocaine helpful     Principal Problem:    Anasarca  Active Problems:    Alcoholic cirrhosis of liver with ascites (H)    Chronic hepatitis C virus infection (H)      Objective:  Vital signs in last 24 hours:  /49 (BP Location: Right arm)   Pulse 76   Temp 98  F (36.7  C) (Oral)   Resp 16   Ht 1.651 m (5' 5\")   Wt 94.2 kg (207 lb 9.6 oz)   SpO2 95%   BMI 34.55 kg/m    Weight:     Vitals:    03/20/22 0447 03/21/22 0340 03/22/22 0542 03/23/22 2157   Weight: 99 kg (218 lb 3.2 oz) 97.8 kg (215 lb 11.2 oz) 95.6 kg (210 lb 11.2 oz) 95.6 kg (210 lb 11.2 oz)    03/24/22 0501   Weight: 94.2 kg (207 lb 9.6 oz)      Weight change:   Body mass index is 34.55 kg/m .    Intake/Output last 3 shifts:  I/O last 3 completed shifts:  In: 243 [P.O.:240; I.V.:3]  Out: -   Intake/Output this shift:  I/O this shift:  In: 240 [P.O.:240]  Out: -     Review of Systems:   As per subjective, all others negative.    Physical Exam:  General Appearance:  Alert, cooperative, no distress, up in " chair, eating breakfast, sister at bedside   Head:  Normocephalic, without obvious abnormality, atraumatic   Eyes:  PERRL, conjunctiva/corneas clear, EOM's intact   ENT/Throat: Lips, mucosa, normal; teeth and gums normal, left side of tongue with open area, no erythema    Lymph/Neck: Supple, symmetrical, trachea midline   Lungs:   Clear to auscultation bilaterally, respirations unlabored   Cardiovascular/Heart:  Regular rate and rhythm, S1, S2 normal,no murmur, rub or gallop.    Abdomen:   Soft, non-tender, bowel sounds active all four quadrants,    Musculoskeletal: BLE edema legs with wraps on them    Skin: Skin warm, dry    Neurologic: Alert and oriented x3, Moves all 4 extremities        Imaging: Reviewed      Labs: Reviewed   Recent Results (from the past 24 hour(s))   Magnesium    Collection Time: 03/25/22  7:39 AM   Result Value Ref Range    Magnesium 1.8 1.8 - 2.6 mg/dL   INR    Collection Time: 03/25/22  7:39 AM   Result Value Ref Range    INR 2.25 (H) 0.85 - 1.15   Comprehensive metabolic panel    Collection Time: 03/25/22  7:39 AM   Result Value Ref Range    Sodium 129 (L) 136 - 145 mmol/L    Potassium 4.1 3.5 - 5.0 mmol/L    Chloride 96 (L) 98 - 107 mmol/L    Carbon Dioxide (CO2) 22 22 - 31 mmol/L    Anion Gap 11 5 - 18 mmol/L    Urea Nitrogen 8 8 - 22 mg/dL    Creatinine 0.72 0.60 - 1.10 mg/dL    Calcium 8.7 8.5 - 10.5 mg/dL    Glucose 84 70 - 125 mg/dL    Alkaline Phosphatase 110 45 - 120 U/L     (H) 0 - 40 U/L    ALT 90 (H) 0 - 45 U/L    Protein Total 8.1 (H) 6.0 - 8.0 g/dL    Albumin 2.6 (L) 3.5 - 5.0 g/dL    Bilirubin Total 9.2 (H) 0.0 - 1.0 mg/dL    GFR Estimate >90 >60 mL/min/1.73m2   Bilirubin direct    Collection Time: 03/25/22  7:39 AM   Result Value Ref Range    Bilirubin Direct 1.6 (H) <=0.5 mg/dL       Total time spent 25 minutes with greater than 50% in consultation,coordination of care. Discussed with with RN, MD.       Brittani MEDINA, FNP-C  Acute Care Pain Management Program  [facility] ([facility] [place] [place]  Monday-Friday 8a-4p   Page via online paging system or call [phone]

## 2022-03-25 NOTE — PROGRESS NOTES
"GI PROGRESS NOTE  3/21/2022  Marj Slater  1960  /-13    Subjective:   More confused today. Did not recognize her sister this morning. Did not sleep well overnight.       Objective:     Blood pressure 116/49, pulse 76, temperature 98  F (36.7  C), temperature source Oral, resp. rate 16, height 1.651 m (5' 5\"), weight 94.2 kg (207 lb 9.6 oz), SpO2 95 %.    Body mass index is 34.55 kg/m .  Gen: NAD  Cardio: RRR  GI: Non-distended, BS positive, soft, diffuse tenderness throughout  Neuro: sleepy. Alert, orientated x4. No asterixis.     Laboratory:  BMP  Recent Labs   Lab 03/25/22  0739 03/24/22  0707 03/23/22  1653 03/23/22  0722   * 131*  --  128*   POTASSIUM 4.1 3.7 3.7 3.4*   CHLORIDE 96* 94*  --  92*   DAWIT 8.7 8.2*  --  8.3*   CO2 22 30  --  27   BUN 8 10  --  11   CR 0.72 0.87  --  0.78   GLC 84 84  --  92     CBC  Recent Labs   Lab 03/23/22  0722 03/21/22  0830 03/20/22  0531   WBC 9.6 9.7 7.6   RBC 2.88* 2.99* 2.94*   HGB 9.7* 10.0* 9.8*   HCT 28.3* 29.3* 29.3*   MCV 98 98 100   MCH 33.7* 33.4* 33.3*   MCHC 34.3 34.1 33.4   RDW 16.3* 15.9* 15.9*   * 144* 137*     INR  Recent Labs   Lab 03/25/22  0739 03/24/22  0811 03/23/22  0756   INR 2.25* 2.42* 2.43*      LFTs  Recent Labs   Lab Test 03/25/22  0739 03/24/22  0707 03/23/22  0722 03/22/22  0519 03/21/22  1327 03/15/22  1712 03/15/22  1623   ALBUMIN 2.6* 2.4* 2.6*   < >  --    < >  --    BILITOTAL 9.2* 8.9* 9.8*   < >  --    < >  --    ALT 90* 79* 82*   < >  --    < >  --    * 194* 232*   < >  --    < >  --    PROTEIN  --   --   --   --  Negative  --  30 *    < > = values in this interval not displayed.     Imaging:  EXAM: US ABDOMEN LIMITED WITH DOPPLER COMPLETE  LOCATION: [facility]  DATE/TIME: 3/21/2022 1:53 PM     INDICATION: HIGH VOLUME paracentesis with or without diagnostic fluid analysis with labs to be drawn if ordered. Total paracentesis volume as much as possible.  COMPARISON: 3/15/2022 " CT  TECHNIQUE: Complete abdominal ultrasound. Color flow with spectral Doppler and waveform analysis performed.     FINDINGS:  GALLBLADDER: Single gallstone. Upper normal wall thickness of 3 mm.   BILE DUCTS: No biliary dilatation. The common duct measures 6 mm.  LIVER: Coarsened echotexture, suggesting underlying fibrosis. Nodular contour. No focal mass.   RIGHT KIDNEY: Normal size. Normal echogenicity with no hydronephrosis or mass.   LEFT KIDNEY: Normal size. No hydronephrosis.  SPLEEN: Enlarged measuring 13.5 cm craniocaudal  PANCREAS: The visualized portions are normal.  AORTA: Normal in caliber.  IVC: Normal where visualized.  Trace ascites.     ABDOMINAL DUPLEX: The hepatic artery, right and middle hepatic veins, IVC, portal veins, and splenic vein are patent with flow in the normal direction. The left hepatic vein could not be visualized.                                       IMPRESSION:  1.  Cirrhosis.  2.  Portal hypertension with splenomegaly and ascites.  3.  Cholelithiasis.  4.  Left hepatic vein not visualized, otherwise normal abdominal liver duplex.    1. PARACENTESIS  2. ULTRASOUND GUIDANCE  LOCATION: [facility]  DATE/TIME: 3/15/2022 8:37 PM     INDICATION: Ascites.     PROCEDURE: Informed consent obtained. Time out performed. The abdomen was prepped and draped in a sterile fashion. 10 mL of 1% lidocaine was infused into local soft tissues. A 5 Brazilian catheter system was introduced into the abdominal ascites under   ultrasound guidance.     3.7 liters of clear fluid were removed and sent to the lab.     Patient tolerated procedure well.  Ultrasound imaging was obtained and placed in the patient's permanent medical record.                                             IMPRESSION:  1.  Status post ultrasound-guided paracentesis.    EXAM: CT ABDOMEN PELVIS W CONTRAST  LOCATION: [facility]  DATE/TIME: 3/15/2022 6:26 PM                                  IMPRESSION:   1.  Hepatic cirrhosis and steatosis portal hypertension including large volume ascites, mild splenic enlargement and small caliber upper abdominal varices.  2.  Cholelithiasis.     Assessment:   61 year-old female with h/o Hepatitis C s/p treatment, cirrhosis, alcohol abuse admitted with LE edema. Now she is encephalopathic with rising LFTs.     1. Elevated LFTs  2. Encephalopathy  Cause unclear. ? EBV infection which can lead to hemolysis (haptoglobin low, elevated indirect bili, smear pending). EBV results still pending. Ammonia elevated. Now on lactulose and rifaximin. Was on narcotics from pain control, but now these have been held, pain team consulted. Extensive work-up otherwise normal/unremarkable. UA normal. Duplex US negative for thrombus or other abnormality. No ascites on US. Previous tap negative for SBP. WBC normal. Folate, Vit B12 normal. CMV negative. PETH normal. Acetaminophen level normal. Blood cultures, C. Diff PCR negative. Hep C antibody positive as expected, but Hep B and A negative. Head CT without acute changes.     Bilirubin mildly increased to 9.2 today. Will repeat direct bili today. EBV PCR mildly elevated (<500), haptoglobin low, elevated indirect bili, high LDH, peripheral smear shows evidence of hemolysis. ? Due to EBV. Will consult hematology for their opinion. Could start acyclovir, but with low EBV PCR may not help.      3. Cirrhosis with ascites  MELD 28. Undergoing diuresis for anasarca. Reports sobriety since 2021, PETH pending. Needs liver clinic follow-up and screening EGD (twin scheduled 3/30/22). INR 2.25 today, will give additional Vit K.       Plan:     1. Hematology consult for hemolysis   2. Consider starting acyclovir for EBV  3. Continue to trend LFTs, INR  4. IV Vit K 5mg   5. Lactulose decreased to once daily, titrate to 2-3 stools daily.   6. Continue rifaximin 550mg twice daily   7. Outpatient liver clinic follow-up 5/12/22.   8. EGD for varix  screening currently scheduled 3/30/22, we will plan to reschedule as patient will likely still be in TCU at that time.                                                                            Faviola Leyva PA-C  Thank you for the opportunity to participate in the care of this patient.   Please feel free to call me with any questions or concerns.  Phone number [phone].

## 2022-03-25 NOTE — SIGNIFICANT EVENT
"Significant Event Note    Time of event: 2:50 AM March 25, 2022    Description of event:  Patient is restless. Did not become sleepy with melatonin.     Admitted with alcoholic liver cirrhosis. Has known acute metabolic encephalopathy.    BP 98/53 (BP Location: Right arm)   Pulse 80   Temp 97.9  F (36.6  C) (Oral)   Resp 18   Ht 1.651 m (5' 5\")   Wt 94.2 kg (207 lb 9.6 oz)   SpO2 91%   BMI 34.55 kg/m      Plan:  Continue prior measures - lactulose, rifaximin   Especially conservative, reorient, etc   Adding trazadone -- caution with selective serotonin reuptake inhibitor but feel safer / lower risk than antipsychotic, etc     Discussed with: bedside nurse    Linda Orozco,      "

## 2022-03-25 NOTE — PROGRESS NOTES
Care Management Follow Up    Length of Stay (days): 10    Expected Discharge Date: 03/28/2022   Concerns to be Addressed:  Encephalopathy, EBV work up     Patient plan of care discussed at interdisciplinary rounds: Yes    Anticipated Discharge Disposition: TCU with potential need of LTC pending patient's clinical progression     Anticipated Discharge Services: TCU  Anticipated Discharge DME:  Not at this time    Patient/family educated on Medicare website which has current facility and service quality ratings: yes   Education Provided on the Discharge Plan: per team  Patient/Family in Agreement with the Plan:  yes    Referrals Placed by CM/SW: referrals resent    Private pay costs discussed: not at this time    Additional Information:  Met with patient and pt's sister sally Ramirezer present to assist with discharge plan. MD present. Provided updates on TCU referrals. Sister in agreement of additional referrals being placed. Sister tells writer that pending pt's clinical progress in TCU she would consider LTC. Additional referrals made to [facility], [facility]. Resent initial referrals with anticipated discharge date for 3/28.    1430 Message on writer's line Tej Martin, no beds anticipated til mid week. Care Management to follow up with admissions if bed is still needed.        Meagan Handy RN

## 2022-03-25 NOTE — PROGRESS NOTES
9:40AM - CHW received delegation from Meagan Handy RN CM, to follow up on TCU referrals.    Abril Thomson - Left message on admissions voicemail to call back to [phone].    Updated destination comments on Care Management tab.    ERIBERTO Arntd  Inpatient Community Health Worker  [facility], P2

## 2022-03-26 LAB
ALBUMIN SERPL-MCNC: 2.4 G/DL (ref 3.5–5)
ALP SERPL-CCNC: 109 U/L (ref 45–120)
ALT SERPL W P-5'-P-CCNC: 79 U/L (ref 0–45)
ANION GAP SERPL CALCULATED.3IONS-SCNC: 10 MMOL/L (ref 5–18)
AST SERPL W P-5'-P-CCNC: 148 U/L (ref 0–40)
BACTERIA BLD CULT: NO GROWTH
BACTERIA BLD CULT: NO GROWTH
BASOPHILS # BLD AUTO: 0.1 10E3/UL (ref 0–0.2)
BASOPHILS NFR BLD AUTO: 1 %
BILIRUB SERPL-MCNC: 8.6 MG/DL (ref 0–1)
BUN SERPL-MCNC: 10 MG/DL (ref 8–22)
CALCIUM SERPL-MCNC: 8.3 MG/DL (ref 8.5–10.5)
CHLORIDE BLD-SCNC: 96 MMOL/L (ref 98–107)
CO2 SERPL-SCNC: 26 MMOL/L (ref 22–31)
CREAT SERPL-MCNC: 0.85 MG/DL (ref 0.6–1.1)
EOSINOPHIL # BLD AUTO: 0.1 10E3/UL (ref 0–0.7)
EOSINOPHIL NFR BLD AUTO: 2 %
ERYTHROCYTE [DISTWIDTH] IN BLOOD BY AUTOMATED COUNT: 17.1 % (ref 10–15)
GFR SERPL CREATININE-BSD FRML MDRD: 77 ML/MIN/1.73M2
GLUCOSE BLD-MCNC: 95 MG/DL (ref 70–125)
HCT VFR BLD AUTO: 27.6 % (ref 35–47)
HGB BLD-MCNC: 9.4 G/DL (ref 11.7–15.7)
IMM GRANULOCYTES # BLD: 0.1 10E3/UL
IMM GRANULOCYTES NFR BLD: 1 %
INR PPP: 2.43 (ref 0.85–1.15)
LYMPHOCYTES # BLD AUTO: 1.4 10E3/UL (ref 0.8–5.3)
LYMPHOCYTES NFR BLD AUTO: 17 %
MAGNESIUM SERPL-MCNC: 1.9 MG/DL (ref 1.8–2.6)
MCH RBC QN AUTO: 34.4 PG (ref 26.5–33)
MCHC RBC AUTO-ENTMCNC: 34.1 G/DL (ref 31.5–36.5)
MCV RBC AUTO: 101 FL (ref 78–100)
MONOCYTES # BLD AUTO: 1.6 10E3/UL (ref 0–1.3)
MONOCYTES NFR BLD AUTO: 20 %
NEUTROPHILS # BLD AUTO: 5.1 10E3/UL (ref 1.6–8.3)
NEUTROPHILS NFR BLD AUTO: 59 %
NRBC # BLD AUTO: 0 10E3/UL
NRBC BLD AUTO-RTO: 0 /100
PLATELET # BLD AUTO: 133 10E3/UL (ref 150–450)
POTASSIUM BLD-SCNC: 3.6 MMOL/L (ref 3.5–5)
PROT SERPL-MCNC: 7.5 G/DL (ref 6–8)
RBC # BLD AUTO: 2.73 10E6/UL (ref 3.8–5.2)
SODIUM SERPL-SCNC: 132 MMOL/L (ref 136–145)
WBC # BLD AUTO: 8.3 10E3/UL (ref 4–11)

## 2022-03-26 PROCEDURE — 250N000013 HC RX MED GY IP 250 OP 250 PS 637: Performed by: INTERNAL MEDICINE

## 2022-03-26 PROCEDURE — 120N000001 HC R&B MED SURG/OB

## 2022-03-26 PROCEDURE — 250N000013 HC RX MED GY IP 250 OP 250 PS 637: Performed by: HOSPITALIST

## 2022-03-26 PROCEDURE — 250N000009 HC RX 250: Performed by: NURSE PRACTITIONER

## 2022-03-26 PROCEDURE — 82040 ASSAY OF SERUM ALBUMIN: CPT | Performed by: INTERNAL MEDICINE

## 2022-03-26 PROCEDURE — 80053 COMPREHEN METABOLIC PANEL: CPT | Performed by: INTERNAL MEDICINE

## 2022-03-26 PROCEDURE — 83735 ASSAY OF MAGNESIUM: CPT | Performed by: INTERNAL MEDICINE

## 2022-03-26 PROCEDURE — 250N000011 HC RX IP 250 OP 636: Performed by: INTERNAL MEDICINE

## 2022-03-26 PROCEDURE — 250N000009 HC RX 250: Performed by: HOSPITALIST

## 2022-03-26 PROCEDURE — 83070 ASSAY OF HEMOSIDERIN QUAL: CPT | Performed by: INTERNAL MEDICINE

## 2022-03-26 PROCEDURE — 36415 COLL VENOUS BLD VENIPUNCTURE: CPT | Performed by: INTERNAL MEDICINE

## 2022-03-26 PROCEDURE — 85610 PROTHROMBIN TIME: CPT | Performed by: INTERNAL MEDICINE

## 2022-03-26 PROCEDURE — 99233 SBSQ HOSP IP/OBS HIGH 50: CPT | Performed by: INTERNAL MEDICINE

## 2022-03-26 PROCEDURE — 85004 AUTOMATED DIFF WBC COUNT: CPT | Performed by: INTERNAL MEDICINE

## 2022-03-26 RX ORDER — LIDOCAINE HYDROCHLORIDE 20 MG/ML
5 SOLUTION OROPHARYNGEAL EVERY 6 HOURS PRN
Status: DISCONTINUED | OUTPATIENT
Start: 2022-03-26 | End: 2022-03-28 | Stop reason: HOSPADM

## 2022-03-26 RX ORDER — POTASSIUM CHLORIDE 1.5 G/1.58G
20 POWDER, FOR SOLUTION ORAL ONCE
Status: COMPLETED | OUTPATIENT
Start: 2022-03-26 | End: 2022-03-26

## 2022-03-26 RX ORDER — MAGNESIUM SULFATE HEPTAHYDRATE 40 MG/ML
2 INJECTION, SOLUTION INTRAVENOUS ONCE
Status: COMPLETED | OUTPATIENT
Start: 2022-03-26 | End: 2022-03-26

## 2022-03-26 RX ADMIN — LIDOCAINE HYDROCHLORIDE 5 ML: 20 SOLUTION ORAL; TOPICAL at 08:39

## 2022-03-26 RX ADMIN — MELATONIN TAB 3 MG 3 MG: 3 TAB at 20:55

## 2022-03-26 RX ADMIN — FUROSEMIDE 40 MG: 20 TABLET ORAL at 08:39

## 2022-03-26 RX ADMIN — LIDOCAINE HYDROCHLORIDE 5 ML: 20 SOLUTION ORAL; TOPICAL at 17:44

## 2022-03-26 RX ADMIN — LIDOCAINE HYDROCHLORIDE 5 ML: 20 SOLUTION ORAL; TOPICAL at 13:31

## 2022-03-26 RX ADMIN — LIDOCAINE HYDROCHLORIDE 5 ML: 20 SOLUTION ORAL; TOPICAL at 20:55

## 2022-03-26 RX ADMIN — RIFAXIMIN 550 MG: 550 TABLET ORAL at 20:55

## 2022-03-26 RX ADMIN — ACETAMINOPHEN 325 MG: 325 TABLET ORAL at 00:31

## 2022-03-26 RX ADMIN — FUROSEMIDE 40 MG: 20 TABLET ORAL at 17:43

## 2022-03-26 RX ADMIN — THERA TABS 1 TABLET: TAB at 08:39

## 2022-03-26 RX ADMIN — LACTULOSE 20 G: 20 SOLUTION ORAL at 08:41

## 2022-03-26 RX ADMIN — POTASSIUM CHLORIDE FOR ORAL SOLUTION 20 MEQ: 1.5 POWDER, FOR SOLUTION ORAL at 11:47

## 2022-03-26 RX ADMIN — RIFAXIMIN 550 MG: 550 TABLET ORAL at 08:41

## 2022-03-26 RX ADMIN — LIDOCAINE: 50 OINTMENT TOPICAL at 18:46

## 2022-03-26 RX ADMIN — ACETAMINOPHEN 325 MG: 325 TABLET ORAL at 17:48

## 2022-03-26 RX ADMIN — SPIRONOLACTONE 100 MG: 25 TABLET, FILM COATED ORAL at 08:39

## 2022-03-26 RX ADMIN — LIDOCAINE HYDROCHLORIDE 5 ML: 20 SOLUTION ORAL; TOPICAL at 01:12

## 2022-03-26 RX ADMIN — MAGNESIUM SULFATE HEPTAHYDRATE 2 G: 40 INJECTION, SOLUTION INTRAVENOUS at 10:56

## 2022-03-26 ASSESSMENT — ACTIVITIES OF DAILY LIVING (ADL)
ADLS_ACUITY_SCORE: 8
ADLS_ACUITY_SCORE: 10
ADLS_ACUITY_SCORE: 8
ADLS_ACUITY_SCORE: 8
ADLS_ACUITY_SCORE: 10
ADLS_ACUITY_SCORE: 12
ADLS_ACUITY_SCORE: 8
ADLS_ACUITY_SCORE: 8
ADLS_ACUITY_SCORE: 10
ADLS_ACUITY_SCORE: 8
ADLS_ACUITY_SCORE: 8
ADLS_ACUITY_SCORE: 10
ADLS_ACUITY_SCORE: 12
ADLS_ACUITY_SCORE: 8
ADLS_ACUITY_SCORE: 10
ADLS_ACUITY_SCORE: 10
ADLS_ACUITY_SCORE: 8
ADLS_ACUITY_SCORE: 8

## 2022-03-26 NOTE — PLAN OF CARE
Problem: Fall Injury Risk  Goal: Absence of Fall and Fall-Related Injury  Outcome: Ongoing, Not Progressing   Patient does not call for staff assistance. Patient mostly steady on feet. Patient reminded to use call light especially when going to the toilet. Lymphedema wraps in place.     Problem: Plan of Care - These are the overarching goals to be used throughout the patient stay.    Goal: Optimal Comfort and Wellbeing  Outcome: Ongoing, Progressing  Intervention: Monitor Pain and Promote Comfort  Recent Flowsheet Documentation  Taken 3/25/2022 1550 by Adamaris Thomas, RN  Pain Management Interventions:   medication (see MAR)   distraction   emotional support     Patient given PRN tylenol for left ear and shoulder pain; helpful.      Goal Outcome Evaluation:

## 2022-03-26 NOTE — PROGRESS NOTES
[facility]    Medicine Progress Note - Hospitalist Service    Date of Admission:  3/15/2022    Assessment & Plan          Marj Slater is a 61 year old female with history of liver cirrhosis presented to emergency department for evaluation of abdominal pain, increased girth, leg edema and weight gain.     Alcoholic liver cirrhosis with ascites, splenomegaly, portal hypertension-  History of hepatitis C-treated  Coagulopathy secondary to cirrhosis, thrombocytopenia  -CT 3/15: hepatic cirrhosis and steatosis portal hypertension including large volume ascites, mild splenic enlargement and small caliber upper abdominal varices  -History of hepatitis C treated last year  - last alcohol use October 2021  -Status post diagnostic and therapeutic paracentesis 3/15, 3.7 L removed.   - Continue furosemide and spironolactone. Change Lasix to oral on 3/24.  - Decrease lactulose to 20 g daily. Titrate for bowel movement 2-3 daily  - Intake and output and daily weight: has lost 34lb since admission.  - Ascites analysis shows no SBP. No indication for antibiotics.   - EGD on 3/30 as scheduled with [facility] outpatient  - Continue Rifaximin  -Monitoring coagulopathy and giving vitamin K as needed, no signs of bleeding  -INR remains elevated 2.43, platelets 133  -3/26: MELD-Na 28 points, 27-32% estimated 90-day mortality    Anasarca with lower extremity edema  Hyponatremia-improving, 132  -secondary to cirrhosis and volume overload related  -US shows no DVT  - Continue diuresis as above. Compression stocking.     Acute metabolic encephalopathy-component of hepatic encephalopathy with a mildly elevated ammonia, narcotic use earlier in stay  -CT head negative. Has mildly elevated ammonia and now improved.  -Continue lactulose and rifaximin as above  -Continue to hold Zoloft  -All narcotics have been stopped and will not be restarted  -She does not take narcotics outpatient   - Insomnia worsens her encephalopathy.  scheduled melatonin at bedtime, trazodone prn.     Possible EBV with hemolysis versus extravascular hemolysis due to enlarged spleen  Hyperbilirubinemia-improving  -3/25: , direct bilirubin 1.6, total bilirubin 9.2, reticulocyte 4.5% absolute reticulocyte 0.129, RADHA broad-spectrum positive, RADHA anti-IgG positive  -3/26:Total bilirubin 8.6  -Low haptoglobin, mildly elevated LDH, positive RADHA  -Hematology/oncology consulted: Checking for cold agglutinins, no treatment at this time pending results     Anemia of chronic disease   -hemoglobin stable. Monitor      Chronic thrombocytopenia- due to liver cirrhosis.   -Platelet count is relatively stable. Monitor     Hypokalemia: Replace per protocol     Hypomagnesemia: replace and recheck     Hyponatremia: sodium stable at 128-130. Due to liver cirrhosis. Monitor.     Tobacco use disorder: Encouraged complete cessation     Obesity: BMI 39.26     Diet: Combination Diet Low Saturated Fat Na <2400mg Diet  Room Service  Snacks/Supplements Adult: Magic Cup; With Meals  Fluid restriction 1200 ML FLUID    DVT Prophylaxis: VTE Prophylaxis contraindicated due to coagulopathy, thrombocytopenia, mechanical due to leg edema  Henderson Catheter: Not present  Central Lines: None  Cardiac Monitoring: None  Code Status: Full Code      Disposition Plan   Expected Discharge: 03/28/2022     Anticipated discharge location: home    Delays:     IV Medication - consider oral or Home Infusion  MD D/C Order   Lab Result Pending (enter specific test & time in comments)  Placement - TCU            The patient's care was discussed with the Bedside Nurse and Patient.    Radha Nina MD  Hospitalist Service  [facility]  Securely message with the Vocera Web Console (learn more here)  Text page via [employer] Paging/Directory         Clinically Significant Risk Factors Present on Admission                     ______________________________________________________________________    Interval History   Mrs. Slater seemed very anxious and was moving and rubbing her face and body repetitively during my exam today.  She had asked for opiates for sleep and ear pain.  Explained to her that those are not medications for sleep.  This ear pain is chronic and she does not take opiates outpatient for it.  Will not be able to use NSAIDs due to coagulopathy, with elevated LFTs, using a lower dose of acetaminophen as needed.  She does state that her lower extremity edema has improved.    Data reviewed today: I reviewed all medications, new labs and imaging results over the last 24 hours. I personally reviewed no images or EKG's today.    Physical Exam   Vital Signs: Temp: 98.6  F (37  C) Temp src: Axillary BP: (!) 141/68 Pulse: 79   Resp: 20 SpO2: 98 % O2 Device: None (Room air)    Weight: 204 lbs 0 oz  General Appearance: Awake, alert, anxious  Respiratory: CTAB, no wheeze  Cardiovascular: RRR, no murmur noted  GI: soft, nontender, non distended, normal bowel sounds  Skin: Jaundice, no rash      Data   Recent Labs   Lab 03/26/22  0712 03/25/22  1540 03/25/22  0739 03/24/22  0811 03/24/22  0707 03/23/22  0756 03/23/22  0722   WBC 8.3 9.6  --   --   --   --  9.6   HGB 9.4* 9.6*  --   --   --   --  9.7*   * 103*  --   --   --   --  98   * 137*  --   --   --   --  113*   INR 2.43*  --  2.25* 2.42*  --    < >  --    *  --  129*  --  131*  --  128*   POTASSIUM 3.6  --  4.1  --  3.7   < > 3.4*   CHLORIDE 96*  --  96*  --  94*  --  92*   CO2 26  --  22  --  30  --  27   BUN 10  --  8  --  10  --  11   CR 0.85  --  0.72  --  0.87  --  0.78   ANIONGAP 10  --  11  --  7  --  9   DAWIT 8.3*  --  8.7  --  8.2*  --  8.3*   GLC 95  --  84  --  84  --  92   ALBUMIN 2.4*  --  2.6*  --  2.4*  --  2.6*   PROTTOTAL 7.5  --  8.1*  --  7.2  --  7.6   BILITOTAL 8.6*  --  9.2*  --  8.9*  --  9.8*   ALKPHOS 109  --  110  --  104  --  112    ALT 79*  --  90*  --  79*  --  82*   *  --  193*  --  194*  --  232*    < > = values in this interval not displayed.     Medications       - MEDICATION INSTRUCTIONS -   Does not apply See Admin Instructions     furosemide  40 mg Oral BID     lactulose  20 g Oral Daily     lidocaine (viscous)  5 mL Swish & Spit 4x Daily     melatonin  3 mg Oral At Bedtime     multivitamin, therapeutic  1 tablet Oral Daily     rifaximin  550 mg Oral BID     [Held by provider] sertraline  50 mg Oral Daily     sodium chloride (PF)  3 mL Intracatheter Q8H     spironolactone  100 mg Oral Daily

## 2022-03-26 NOTE — PLAN OF CARE
Problem: Fluid Volume Excess  Goal: Fluid Balance  Outcome: Ongoing, Progressing     Problem: Pain Chronic (Persistent)  Goal: Acceptable Pain Control and Functional Ability  Intervention: Manage Persistent Pain  Recent Flowsheet Documentation  Taken 3/25/2022 2103 by Shelly Mack RN  Medication Review/Management: medications reviewed. Pt had extreme pain overnight, paged MD to get more lidocaine swish and spit. Pt crying stating nothing helps her pain and she is hungry because she could not eat.      Problem: Fall Injury Risk  Goal: Absence of Fall and Fall-Related Injury  Intervention: Promote Injury-Free Environment  Recent Flowsheet Documentation  Taken 3/25/2022 2103 by Shelly Mack, RN  Safety Promotion/Fall Prevention:    activity supervised    assistive device/personal items within reach    bed alarm on    chair alarm on    nonskid shoes/slippers when out of bed    mobility aid in reach   Goal Outcome Evaluation:

## 2022-03-26 NOTE — PROGRESS NOTES
"GI PROGRESS NOTE  3/21/2022  Marj Slater  1960  /-80    Subjective:   More confused today. Did not recognize her sister this morning. Did not sleep well overnight.       Objective:     Blood pressure (!) 141/68, pulse 79, temperature 98.6  F (37  C), temperature source Axillary, resp. rate 20, height 1.651 m (5' 5\"), weight 92.5 kg (204 lb), SpO2 98 %.    Body mass index is 33.95 kg/m .  Gen: NAD  Cardio: RRR  GI: Non-distended, BS positive, soft, diffuse tenderness throughout  Neuro: sleepy. Alert, orientated x4. No asterixis.     Laboratory:  BMP  Recent Labs   Lab 03/26/22  0712 03/25/22  0739 03/24/22  0707   * 129* 131*   POTASSIUM 3.6 4.1 3.7   CHLORIDE 96* 96* 94*   DAWIT 8.3* 8.7 8.2*   CO2 26 22 30   BUN 10 8 10   CR 0.85 0.72 0.87   GLC 95 84 84     CBC  Recent Labs   Lab 03/26/22  0712 03/25/22  1540 03/23/22  0722   WBC 8.3 9.6 9.6   RBC 2.73* 2.84* 2.88*   HGB 9.4* 9.6* 9.7*   HCT 27.6* 29.1* 28.3*   * 103* 98   MCH 34.4* 33.8* 33.7*   MCHC 34.1 33.0 34.3   RDW 17.1* 17.0* 16.3*   * 137* 113*     INR  Recent Labs   Lab 03/26/22  0712 03/25/22  0739 03/24/22  0811   INR 2.43* 2.25* 2.42*      LFTs  Recent Labs   Lab Test 03/26/22  0712 03/25/22  0739 03/24/22  0707 03/22/22  0519 03/21/22  1327 03/15/22  1712 03/15/22  1623   ALBUMIN 2.4* 2.6* 2.4*   < >  --    < >  --    BILITOTAL 8.6* 9.2* 8.9*   < >  --    < >  --    ALT 79* 90* 79*   < >  --    < >  --    * 193* 194*   < >  --    < >  --    PROTEIN  --   --   --   --  Negative  --  30 *    < > = values in this interval not displayed.     Imaging:  EXAM: US ABDOMEN LIMITED WITH DOPPLER COMPLETE  LOCATION: [facility]  DATE/TIME: 3/21/2022 1:53 PM     INDICATION: HIGH VOLUME paracentesis with or without diagnostic fluid analysis with labs to be drawn if ordered. Total paracentesis volume as much as possible.  COMPARISON: 3/15/2022 CT  TECHNIQUE: Complete abdominal ultrasound. Color " flow with spectral Doppler and waveform analysis performed.     FINDINGS:  GALLBLADDER: Single gallstone. Upper normal wall thickness of 3 mm.   BILE DUCTS: No biliary dilatation. The common duct measures 6 mm.  LIVER: Coarsened echotexture, suggesting underlying fibrosis. Nodular contour. No focal mass.   RIGHT KIDNEY: Normal size. Normal echogenicity with no hydronephrosis or mass.   LEFT KIDNEY: Normal size. No hydronephrosis.  SPLEEN: Enlarged measuring 13.5 cm craniocaudal  PANCREAS: The visualized portions are normal.  AORTA: Normal in caliber.  IVC: Normal where visualized.  Trace ascites.     ABDOMINAL DUPLEX: The hepatic artery, right and middle hepatic veins, IVC, portal veins, and splenic vein are patent with flow in the normal direction. The left hepatic vein could not be visualized.                                       IMPRESSION:  1.  Cirrhosis.  2.  Portal hypertension with splenomegaly and ascites.  3.  Cholelithiasis.  4.  Left hepatic vein not visualized, otherwise normal abdominal liver duplex.    1. PARACENTESIS  2. ULTRASOUND GUIDANCE  LOCATION: [facility]  DATE/TIME: 3/15/2022 8:37 PM     INDICATION: Ascites.     PROCEDURE: Informed consent obtained. Time out performed. The abdomen was prepped and draped in a sterile fashion. 10 mL of 1% lidocaine was infused into local soft tissues. A 5 [place] catheter system was introduced into the abdominal ascites under   ultrasound guidance.     3.7 liters of clear fluid were removed and sent to the lab.     Patient tolerated procedure well.  Ultrasound imaging was obtained and placed in the patient's permanent medical record.                                             IMPRESSION:  1.  Status post ultrasound-guided paracentesis.    EXAM: CT ABDOMEN PELVIS W CONTRAST  LOCATION: [facility]  DATE/TIME: 3/15/2022 6:26 PM                                 IMPRESSION:   1.  Hepatic cirrhosis and steatosis portal  hypertension including large volume ascites, mild splenic enlargement and small caliber upper abdominal varices.  2.  Cholelithiasis.     Assessment:   61 year-old female with h/o Hepatitis C s/p treatment, cirrhosis, alcohol abuse admitted with LE edema. Now she is encephalopathic with rising LFTs.     1. Elevated LFTs  2. Encephalopathy  Cause unclear. ? EBV infection which can lead to hemolysis (haptoglobin low, elevated indirect bili, smear pending). EBV results still pending. Ammonia elevated. Now on lactulose and rifaximin. Was on narcotics from pain control, but now these have been held, pain team consulted. Extensive work-up otherwise normal/unremarkable. UA normal. Duplex US negative for thrombus or other abnormality. No ascites on US. Previous tap negative for SBP. WBC normal. Folate, Vit B12 normal. CMV negative. PETH normal. Acetaminophen level normal. Blood cultures, C. Diff PCR negative. Hep C antibody positive as expected, but Hep B and A negative. Head CT without acute changes.     Bilirubin improved, and remains mostly indirect. EBV PCR mildly elevated (<500), haptoglobin low, elevated indirect bili, high LDH, peripheral smear shows evidence of hemolysis. Hematology has been consulted. RADHA positive, however likely does not need treatment at this time due to stable hemoglobin. ? Due to EBV. Given improving LFTs, will hold off on acyclovir.     3. Cirrhosis with ascites  MELD-NA 27. Undergoing diuresis for anasarca. Reports sobriety since 2021, PETH pending. Needs liver clinic follow-up and screening EGD (twin scheduled 3/30/22 with plans to reschedule).     INR up at 4.43 today despite addition Vit K yesterday. Unclear 2.25 today, will give additional Vit K.      Creatinine stable at 0.85 on oral furosemide and spironolactone.     4. Hyponatremia  2/2 cirrhosis. NA 32 today. On 1200mL fluid restriction.      Plan:     1. Appreciate hematology's input   2. Continue to trend LFTs, INR  3. Continue  lactulose 20g once daily, titrate to 2-3 stools daily.   4. Continue rifaximin 550mg twice daily   5. Continue oral diuretics, currently on furosemide 40mg bid and spironolactone 100mg daily.   6. Low sodium diet, 1200mL fluid restriction  7. Outpatient liver clinic follow-up 5/12/22.   8. EGD for varix screening currently scheduled 3/30/22, we will plan to reschedule as patient will likely still be in TCU at that time.                                                               Faviola Leyva PA-C  Thank you for the opportunity to participate in the care of this patient.   Please feel free to call me with any questions or concerns.  Phone number [phone].

## 2022-03-26 NOTE — PLAN OF CARE
"Problem: Pain Acute  Goal: Acceptable Pain Control and Functional Ability  Outcome: Ongoing, Not Progressing  Intervention: Develop Pain Management Plan  Recent Flowsheet Documentation  Taken 3/26/2022 1230 by Jorge Luis Umana, RN  Pain Management Interventions:   declines   emotional support  Taken 3/26/2022 0830 by Jorge Luis Umana, RN  Pain Management Interventions: medication (see MAR)  Intervention: Prevent or Manage Pain  Recent Flowsheet Documentation  Taken 3/26/2022 0830 by Jorge Luis Umana, RN  Medication Review/Management: medications revi      Patient continues to c/o throat pain rated 4-8/10,  radiating to her left ears. PRN Tylenol offered but declined stating, \"It does not work\". Scheduled Lidocaine Swish and spit given with some relief.     -Patient noted to be restless this a.m, with tremors to bilateral hands.Patient still confused. Hospitalist aware.  -Addiction consult pending  -Loose stool X 1 this shift.               "

## 2022-03-27 ENCOUNTER — APPOINTMENT (OUTPATIENT)
Dept: OCCUPATIONAL THERAPY | Facility: HOSPITAL | Age: 62
DRG: 432 | End: 2022-03-27
Payer: COMMERCIAL

## 2022-03-27 LAB
ALBUMIN SERPL-MCNC: 2.3 G/DL (ref 3.5–5)
ALP SERPL-CCNC: 105 U/L (ref 45–120)
ALT SERPL W P-5'-P-CCNC: 73 U/L (ref 0–45)
ANION GAP SERPL CALCULATED.3IONS-SCNC: 10 MMOL/L (ref 5–18)
AST SERPL W P-5'-P-CCNC: 116 U/L (ref 0–40)
BILIRUB DIRECT SERPL-MCNC: 2.7 MG/DL
BILIRUB SERPL-MCNC: 7.9 MG/DL (ref 0–1)
BUN SERPL-MCNC: 11 MG/DL (ref 8–22)
CALCIUM SERPL-MCNC: 8.2 MG/DL (ref 8.5–10.5)
CHLORIDE BLD-SCNC: 98 MMOL/L (ref 98–107)
CO2 SERPL-SCNC: 25 MMOL/L (ref 22–31)
CREAT SERPL-MCNC: 0.81 MG/DL (ref 0.6–1.1)
ERYTHROCYTE [DISTWIDTH] IN BLOOD BY AUTOMATED COUNT: 17.2 % (ref 10–15)
GFR SERPL CREATININE-BSD FRML MDRD: 81 ML/MIN/1.73M2
GLUCOSE BLD-MCNC: 106 MG/DL (ref 70–125)
HCT VFR BLD AUTO: 27.1 % (ref 35–47)
HGB BLD-MCNC: 9.2 G/DL (ref 11.7–15.7)
INR PPP: 2.43 (ref 0.85–1.15)
MAGNESIUM SERPL-MCNC: 1.9 MG/DL (ref 1.8–2.6)
MCH RBC QN AUTO: 34.5 PG (ref 26.5–33)
MCHC RBC AUTO-ENTMCNC: 33.9 G/DL (ref 31.5–36.5)
MCV RBC AUTO: 102 FL (ref 78–100)
PLATELET # BLD AUTO: 98 10E3/UL (ref 150–450)
POTASSIUM BLD-SCNC: 3.6 MMOL/L (ref 3.5–5)
PROT SERPL-MCNC: 7.3 G/DL (ref 6–8)
RBC # BLD AUTO: 2.67 10E6/UL (ref 3.8–5.2)
SODIUM SERPL-SCNC: 133 MMOL/L (ref 136–145)
WBC # BLD AUTO: 8.8 10E3/UL (ref 4–11)

## 2022-03-27 PROCEDURE — 85610 PROTHROMBIN TIME: CPT | Performed by: INTERNAL MEDICINE

## 2022-03-27 PROCEDURE — 250N000013 HC RX MED GY IP 250 OP 250 PS 637: Performed by: STUDENT IN AN ORGANIZED HEALTH CARE EDUCATION/TRAINING PROGRAM

## 2022-03-27 PROCEDURE — 250N000009 HC RX 250: Performed by: NURSE PRACTITIONER

## 2022-03-27 PROCEDURE — 80053 COMPREHEN METABOLIC PANEL: CPT | Performed by: INTERNAL MEDICINE

## 2022-03-27 PROCEDURE — 250N000009 HC RX 250: Performed by: INTERNAL MEDICINE

## 2022-03-27 PROCEDURE — 250N000013 HC RX MED GY IP 250 OP 250 PS 637: Performed by: INTERNAL MEDICINE

## 2022-03-27 PROCEDURE — 99233 SBSQ HOSP IP/OBS HIGH 50: CPT | Performed by: INTERNAL MEDICINE

## 2022-03-27 PROCEDURE — 36415 COLL VENOUS BLD VENIPUNCTURE: CPT | Performed by: INTERNAL MEDICINE

## 2022-03-27 PROCEDURE — 97535 SELF CARE MNGMENT TRAINING: CPT | Mod: GO

## 2022-03-27 PROCEDURE — 250N000013 HC RX MED GY IP 250 OP 250 PS 637: Performed by: HOSPITALIST

## 2022-03-27 PROCEDURE — 83735 ASSAY OF MAGNESIUM: CPT | Performed by: INTERNAL MEDICINE

## 2022-03-27 PROCEDURE — 85027 COMPLETE CBC AUTOMATED: CPT | Performed by: INTERNAL MEDICINE

## 2022-03-27 PROCEDURE — 82248 BILIRUBIN DIRECT: CPT | Performed by: INTERNAL MEDICINE

## 2022-03-27 PROCEDURE — 120N000001 HC R&B MED SURG/OB

## 2022-03-27 RX ORDER — POTASSIUM CHLORIDE 1500 MG/1
20 TABLET, EXTENDED RELEASE ORAL ONCE
Status: COMPLETED | OUTPATIENT
Start: 2022-03-27 | End: 2022-03-27

## 2022-03-27 RX ORDER — MAGNESIUM OXIDE 400 MG/1
400 TABLET ORAL 2 TIMES DAILY
Status: DISCONTINUED | OUTPATIENT
Start: 2022-03-27 | End: 2022-03-28 | Stop reason: HOSPADM

## 2022-03-27 RX ADMIN — RIFAXIMIN 550 MG: 550 TABLET ORAL at 08:58

## 2022-03-27 RX ADMIN — ACETAMINOPHEN 325 MG: 325 TABLET ORAL at 17:13

## 2022-03-27 RX ADMIN — LACTULOSE 20 G: 20 SOLUTION ORAL at 08:59

## 2022-03-27 RX ADMIN — THERA TABS 1 TABLET: TAB at 08:59

## 2022-03-27 RX ADMIN — LIDOCAINE HYDROCHLORIDE 5 ML: 20 SOLUTION ORAL; TOPICAL at 14:09

## 2022-03-27 RX ADMIN — POTASSIUM CHLORIDE 20 MEQ: 1500 TABLET, EXTENDED RELEASE ORAL at 08:59

## 2022-03-27 RX ADMIN — BENZOCAINE: 220 SPRAY, METERED PERIODONTAL at 18:57

## 2022-03-27 RX ADMIN — LIDOCAINE HYDROCHLORIDE 5 ML: 20 SOLUTION ORAL; TOPICAL at 08:58

## 2022-03-27 RX ADMIN — MAGNESIUM OXIDE TAB 400 MG (241.3 MG ELEMENTAL MG) 400 MG: 400 (241.3 MG) TAB at 20:12

## 2022-03-27 RX ADMIN — SPIRONOLACTONE 100 MG: 25 TABLET, FILM COATED ORAL at 08:59

## 2022-03-27 RX ADMIN — MAGNESIUM OXIDE TAB 400 MG (241.3 MG ELEMENTAL MG) 400 MG: 400 (241.3 MG) TAB at 08:58

## 2022-03-27 RX ADMIN — FUROSEMIDE 40 MG: 20 TABLET ORAL at 08:58

## 2022-03-27 RX ADMIN — MELATONIN TAB 3 MG 3 MG: 3 TAB at 20:12

## 2022-03-27 RX ADMIN — ACETAMINOPHEN 325 MG: 325 TABLET ORAL at 08:58

## 2022-03-27 RX ADMIN — TRAZODONE HYDROCHLORIDE 50 MG: 50 TABLET ORAL at 21:44

## 2022-03-27 RX ADMIN — LIDOCAINE HYDROCHLORIDE 5 ML: 20 SOLUTION ORAL; TOPICAL at 17:13

## 2022-03-27 RX ADMIN — FUROSEMIDE 40 MG: 20 TABLET ORAL at 17:13

## 2022-03-27 RX ADMIN — RIFAXIMIN 550 MG: 550 TABLET ORAL at 20:13

## 2022-03-27 ASSESSMENT — ACTIVITIES OF DAILY LIVING (ADL)
ADLS_ACUITY_SCORE: 8

## 2022-03-27 NOTE — PLAN OF CARE
Goal Outcome Evaluation:    Problem: Plan of Care - These are the overarching goals to be used throughout the patient stay.    Goal: Plan of Care Review/Shift Note  Outcome: Ongoing, Progressing    Pt slept well majority of shift. Awoke early morning to use bathroom and was noted to have some restlessness. Pt continuously tries to get out of bed on own; bed alarm on. VSS. Awaiting results of urine sample.     Problem: Fluid Volume Excess  Goal: Fluid Balance  Outcome: Ongoing, Progressing     Pt remains on 1200mL/day fluid restriction.

## 2022-03-27 NOTE — PROGRESS NOTES
"GI PROGRESS NOTE  3/21/2022  Marj Ramireznn  1960  /-79    Subjective:   Feels about the same today. No new complaints.      Objective:     Blood pressure 115/56, pulse 79, temperature 98.3  F (36.8  C), temperature source Oral, resp. rate 20, height 1.651 m (5' 5\"), weight 92.5 kg (204 lb), SpO2 92 %.    Body mass index is 33.95 kg/m .  Gen: NAD  Cardio: RRR  GI: Non-distended, BS positive, soft, diffuse tenderness throughout  Neuro: sleepy. Alert, orientated x4. No asterixis.     Laboratory:  BMP  Recent Labs   Lab 03/27/22  0715 03/26/22  0712 03/25/22  0739   * 132* 129*   POTASSIUM 3.6 3.6 4.1   CHLORIDE 98 96* 96*   DAWIT 8.2* 8.3* 8.7   CO2 25 26 22   BUN 11 10 8   CR 0.81 0.85 0.72    95 84     CBC  Recent Labs   Lab 03/27/22  0715 03/26/22  0712 03/25/22  1540   WBC 8.8 8.3 9.6   RBC 2.67* 2.73* 2.84*   HGB 9.2* 9.4* 9.6*   HCT 27.1* 27.6* 29.1*   * 101* 103*   MCH 34.5* 34.4* 33.8*   MCHC 33.9 34.1 33.0   RDW 17.2* 17.1* 17.0*   PLT 98* 133* 137*     INR  Recent Labs   Lab 03/27/22  0715 03/26/22  0712 03/25/22  0739   INR 2.43* 2.43* 2.25*      LFTs  Recent Labs   Lab Test 03/27/22  0715 03/26/22  0712 03/25/22  0739 03/22/22  0519 03/21/22  1327 03/15/22  1712 03/15/22  1623   ALBUMIN 2.3* 2.4* 2.6*   < >  --    < >  --    BILITOTAL 7.9* 8.6* 9.2*   < >  --    < >  --    ALT 73* 79* 90*   < >  --    < >  --    * 148* 193*   < >  --    < >  --    PROTEIN  --   --   --   --  Negative  --  30 *    < > = values in this interval not displayed.     Imaging:  EXAM: US ABDOMEN LIMITED WITH DOPPLER COMPLETE  LOCATION: [facility]  DATE/TIME: 3/21/2022 1:53 PM     INDICATION: HIGH VOLUME paracentesis with or without diagnostic fluid analysis with labs to be drawn if ordered. Total paracentesis volume as much as possible.  COMPARISON: 3/15/2022 CT  TECHNIQUE: Complete abdominal ultrasound. Color flow with spectral Doppler and waveform analysis " performed.     FINDINGS:  GALLBLADDER: Single gallstone. Upper normal wall thickness of 3 mm.   BILE DUCTS: No biliary dilatation. The common duct measures 6 mm.  LIVER: Coarsened echotexture, suggesting underlying fibrosis. Nodular contour. No focal mass.   RIGHT KIDNEY: Normal size. Normal echogenicity with no hydronephrosis or mass.   LEFT KIDNEY: Normal size. No hydronephrosis.  SPLEEN: Enlarged measuring 13.5 cm craniocaudal  PANCREAS: The visualized portions are normal.  AORTA: Normal in caliber.  IVC: Normal where visualized.  Trace ascites.     ABDOMINAL DUPLEX: The hepatic artery, right and middle hepatic veins, IVC, portal veins, and splenic vein are patent with flow in the normal direction. The left hepatic vein could not be visualized.                                       IMPRESSION:  1.  Cirrhosis.  2.  Portal hypertension with splenomegaly and ascites.  3.  Cholelithiasis.  4.  Left hepatic vein not visualized, otherwise normal abdominal liver duplex.    1. PARACENTESIS  2. ULTRASOUND GUIDANCE  LOCATION: [facility]  DATE/TIME: 3/15/2022 8:37 PM     INDICATION: Ascites.     PROCEDURE: Informed consent obtained. Time out performed. The abdomen was prepped and draped in a sterile fashion. 10 mL of 1% lidocaine was infused into local soft tissues. A 5 [place] catheter system was introduced into the abdominal ascites under   ultrasound guidance.     3.7 liters of clear fluid were removed and sent to the lab.     Patient tolerated procedure well.  Ultrasound imaging was obtained and placed in the patient's permanent medical record.                                             IMPRESSION:  1.  Status post ultrasound-guided paracentesis.    EXAM: CT ABDOMEN PELVIS W CONTRAST  LOCATION: [facility]  DATE/TIME: 3/15/2022 6:26 PM                                 IMPRESSION:   1.  Hepatic cirrhosis and steatosis portal hypertension including large volume ascites, mild  splenic enlargement and small caliber upper abdominal varices.  2.  Cholelithiasis.     Assessment:   61 year-old female with h/o Hepatitis C s/p treatment, cirrhosis, alcohol abuse admitted with LE edema. Now she is encephalopathic with rising LFTs.     1. Elevated LFTs  2. Encephalopathy  Cause unclear. ? EBV infection which can lead to hemolysis (haptoglobin low, elevated indirect bili, smear pending). EBV results still pending. Ammonia elevated. Now on lactulose and rifaximin. Was on narcotics from pain control, but now these have been held, pain team consulted. Extensive work-up otherwise normal/unremarkable. UA normal. Duplex US negative for thrombus or other abnormality. No ascites on US. Previous tap negative for SBP. WBC normal. Folate, Vit B12 normal. CMV negative. PETH normal. Acetaminophen level normal. Blood cultures, C. Diff PCR negative. Hep C antibody positive as expected, but Hep B and A negative. Head CT without acute changes.     Bilirubin improved, and remains mostly indirect. EBV PCR mildly elevated (<500), haptoglobin low, elevated indirect bili, high LDH, peripheral smear shows evidence of hemolysis. Hematology has been consulted. RADHA positive, however likely does not need treatment at this time due to stable hemoglobin. ? Due to EBV. LFTs continue to trend down. Given improving LFTs, will hold off on acyclovir.     3. Cirrhosis with ascites  MELD-NA 26. Reports sobriety since 2021, PETH negative for recent alcohol use. Needs liver clinic follow-up and screening EGD (twin scheduled 3/30/22, but we plan to reschedule).     INR stable 4.43 today despite additional Vit K.     4. Ascites  Paracentesis 3/15 with 3.7L removed. Negative for SBP. Repeat imaging shows no reaccumulation of ascites. Creatinine stable at 0.81 on oral furosemide and spironolactone.     4. Hyponatremia  2/2 cirrhosis.  today. On 1200mL fluid restriction.      Plan:     1. Appreciate hematology's input   2.  Continue to trend LFTs, INR  3. Continue lactulose 20g once daily, titrate to 2-3 stools daily.   4. Continue rifaximin 550mg twice daily   5. Continue oral diuretics, currently on furosemide 40mg bid and spironolactone 100mg daily.   6. Low sodium diet, 1200mL fluid restriction.   7. Outpatient liver clinic follow-up 5/12/22.   8. EGD for varix screening currently scheduled 3/30/22, we will plan to reschedule as patient will likely still be in TCU at that time.                                                               Faviola Leyva PA-C  Thank you for the opportunity to participate in the care of this patient.   Please feel free to call me with any questions or concerns.  Phone number [phone].

## 2022-03-27 NOTE — PLAN OF CARE
Problem: Fluid Volume Excess  Goal: Fluid Balance  Outcome: Ongoing, Not Progressing     Patient on 1200ml/24hr fluid restriction. Patient reported 1 large and 1 small loose stool.      Problem: Pain Acute  Goal: Acceptable Pain Control and Functional Ability  Outcome: Ongoing, Not Progressing  Intervention: Develop Pain Management Plan  Recent Flowsheet Documentation  Taken 3/26/2022 1926 by Adamaris Thomas RN  Pain Management Interventions: (lidocaine ointment applier earlier)   medication (see MAR)   distraction   emotional support  Taken 3/26/2022 1748 by Adamaris Thomas RN  Pain Management Interventions:   medication (see MAR)   distraction   emotional support  Taken 3/26/2022 1529 by Adamaris Thomas RN  Pain Management Interventions:   distraction   emotional support  Intervention: Prevent or Manage Pain  Recent Flowsheet Documentation  Taken 3/26/2022 1753 by Adamaris Thomas RN  Medication Review/Management: medications reviewed     PRN tylenol and lidocaine ointment given for generalized pain; effective.     Problem: Fall Injury Risk  Goal: Absence of Fall and Fall-Related Injury  3/26/2022 2312 by Adamaris Thomas RN  Outcome: Ongoing, Progressing  3/26/2022 2303 by Adamaris Thomas RN  Outcome: Ongoing, Progressing  Intervention: Identify and Manage Contributors  Recent Flowsheet Documentation  Taken 3/26/2022 1753 by Adamaris Thomas RN  Medication Review/Management: medications reviewed  Intervention: Promote Injury-Free Environment  Recent Flowsheet Documentation  Taken 3/26/2022 1753 by Adamaris Thomas RN  Safety Promotion/Fall Prevention:   activity supervised   assistive device/personal items within reach   bed alarm on   chair alarm on   clutter free environment maintained   fall prevention program maintained   nonskid shoes/slippers when out of bed   patient and family education   room near nurse's station   room organization consistent   safety round/check completed   Patient does not use call light and  turned chair alarm off despite patient education provided.          Goal Outcome Evaluation:

## 2022-03-27 NOTE — PLAN OF CARE
"Problem: Pain Acute  Goal: Acceptable Pain Control and Functional Ability  Outcome: Ongoing, Progressing  Intervention: Develop Pain Management Plan  Recent Flowsheet Documentation  Taken 3/27/2022 1713 by Jorge Luis Umana RN  Pain Management Interventions: medication (see MAR)  Taken 3/27/2022 0858 by Jorge Luis Umana, RN  Pain Management Interventions: medication (see MAR)  Intervention: Prevent or Manage Pain  Recent Flowsheet Documentation  Taken 3/27/2022 1713 by Jorge Luis Umana, RN  Medication Review/Management: medications reviewed  Taken 3/27/2022 0858 by Jorge Luis Umana, RN  Medication Review/Management: medications reviewed  Patient continues to c/o throat pain rated 6-8/10. PRN Tylenol and Lidocaine given with minimal relief per patient. Patient calm and seems comfortable with no non-verbal s/s of pain noted.    Patient A&O X 4 this shift. Patient able to turn off bed and chair alarms; seen walking from BR and in room several times this shift. Patient educated on the need for alarms but stated,\"I don't need it. I can walk\". Patient steady on feet with walker. Patient on FR and had about 720 ml this shift.     Problem: Electrolyte Imbalance  Goal: Electrolyte Balance  Outcome: Ongoing, Progressing   Patient on Mag and K protocols. Replacement initiated per order.                       "

## 2022-03-28 ENCOUNTER — APPOINTMENT (OUTPATIENT)
Dept: OCCUPATIONAL THERAPY | Facility: HOSPITAL | Age: 62
DRG: 432 | End: 2022-03-28
Payer: COMMERCIAL

## 2022-03-28 ENCOUNTER — APPOINTMENT (OUTPATIENT)
Dept: PHYSICAL THERAPY | Facility: HOSPITAL | Age: 62
DRG: 432 | End: 2022-03-28
Payer: COMMERCIAL

## 2022-03-28 VITALS
TEMPERATURE: 98 F | HEART RATE: 71 BPM | WEIGHT: 200.1 LBS | OXYGEN SATURATION: 97 % | DIASTOLIC BLOOD PRESSURE: 56 MMHG | BODY MASS INDEX: 33.34 KG/M2 | SYSTOLIC BLOOD PRESSURE: 107 MMHG | HEIGHT: 65 IN | RESPIRATION RATE: 18 BRPM

## 2022-03-28 PROBLEM — R60.1 ANASARCA: Status: RESOLVED | Noted: 2022-03-16 | Resolved: 2022-03-28

## 2022-03-28 LAB
ALBUMIN SERPL-MCNC: 2.3 G/DL (ref 3.5–5)
ALP SERPL-CCNC: 116 U/L (ref 45–120)
ALT SERPL W P-5'-P-CCNC: 70 U/L (ref 0–45)
ANION GAP SERPL CALCULATED.3IONS-SCNC: 7 MMOL/L (ref 5–18)
AST SERPL W P-5'-P-CCNC: 97 U/L (ref 0–40)
BILIRUB DIRECT SERPL-MCNC: 2.6 MG/DL
BILIRUB SERPL-MCNC: 6.5 MG/DL (ref 0–1)
BUN SERPL-MCNC: 10 MG/DL (ref 8–22)
CALCIUM SERPL-MCNC: 8.2 MG/DL (ref 8.5–10.5)
CHLORIDE BLD-SCNC: 102 MMOL/L (ref 98–107)
CO2 SERPL-SCNC: 27 MMOL/L (ref 22–31)
CREAT SERPL-MCNC: 0.79 MG/DL (ref 0.6–1.1)
ERYTHROCYTE [DISTWIDTH] IN BLOOD BY AUTOMATED COUNT: 17.3 % (ref 10–15)
GFR SERPL CREATININE-BSD FRML MDRD: 84 ML/MIN/1.73M2
GLUCOSE BLD-MCNC: 92 MG/DL (ref 70–125)
HCT VFR BLD AUTO: 28.9 % (ref 35–47)
HGB BLD-MCNC: 9.5 G/DL (ref 11.7–15.7)
MAGNESIUM SERPL-MCNC: 1.7 MG/DL (ref 1.8–2.6)
MCH RBC QN AUTO: 33.5 PG (ref 26.5–33)
MCHC RBC AUTO-ENTMCNC: 32.9 G/DL (ref 31.5–36.5)
MCV RBC AUTO: 102 FL (ref 78–100)
PLATELET # BLD AUTO: 144 10E3/UL (ref 150–450)
POTASSIUM BLD-SCNC: 4 MMOL/L (ref 3.5–5)
PROT SERPL-MCNC: 7.2 G/DL (ref 6–8)
RBC # BLD AUTO: 2.84 10E6/UL (ref 3.8–5.2)
SODIUM SERPL-SCNC: 136 MMOL/L (ref 136–145)
WBC # BLD AUTO: 7.8 10E3/UL (ref 4–11)

## 2022-03-28 PROCEDURE — 83735 ASSAY OF MAGNESIUM: CPT | Performed by: INTERNAL MEDICINE

## 2022-03-28 PROCEDURE — 250N000013 HC RX MED GY IP 250 OP 250 PS 637: Performed by: INTERNAL MEDICINE

## 2022-03-28 PROCEDURE — 97129 THER IVNTJ 1ST 15 MIN: CPT | Mod: GO

## 2022-03-28 PROCEDURE — 97116 GAIT TRAINING THERAPY: CPT | Mod: GP

## 2022-03-28 PROCEDURE — 250N000009 HC RX 250: Performed by: NURSE PRACTITIONER

## 2022-03-28 PROCEDURE — 99239 HOSP IP/OBS DSCHRG MGMT >30: CPT | Performed by: INTERNAL MEDICINE

## 2022-03-28 PROCEDURE — 82310 ASSAY OF CALCIUM: CPT | Performed by: INTERNAL MEDICINE

## 2022-03-28 PROCEDURE — 97535 SELF CARE MNGMENT TRAINING: CPT | Mod: GO

## 2022-03-28 PROCEDURE — 97530 THERAPEUTIC ACTIVITIES: CPT | Mod: GP

## 2022-03-28 PROCEDURE — 250N000011 HC RX IP 250 OP 636: Performed by: INTERNAL MEDICINE

## 2022-03-28 PROCEDURE — 250N000013 HC RX MED GY IP 250 OP 250 PS 637: Performed by: HOSPITALIST

## 2022-03-28 PROCEDURE — 85014 HEMATOCRIT: CPT | Performed by: INTERNAL MEDICINE

## 2022-03-28 PROCEDURE — 258N000003 HC RX IP 258 OP 636: Performed by: INTERNAL MEDICINE

## 2022-03-28 PROCEDURE — 36415 COLL VENOUS BLD VENIPUNCTURE: CPT | Performed by: INTERNAL MEDICINE

## 2022-03-28 PROCEDURE — 82248 BILIRUBIN DIRECT: CPT | Performed by: INTERNAL MEDICINE

## 2022-03-28 RX ORDER — FUROSEMIDE 40 MG
40 TABLET ORAL
Qty: 60 TABLET | Refills: 0 | Status: SHIPPED | OUTPATIENT
Start: 2022-03-28 | End: 2023-10-30

## 2022-03-28 RX ORDER — MAGNESIUM OXIDE 400 MG/1
400 TABLET ORAL 2 TIMES DAILY
Qty: 60 TABLET | Refills: 0 | Status: SHIPPED | OUTPATIENT
Start: 2022-03-28 | End: 2022-04-27

## 2022-03-28 RX ORDER — LACTULOSE 10 G/15ML
30 SOLUTION ORAL DAILY
Qty: 1350 ML | Refills: 0 | Status: SHIPPED | OUTPATIENT
Start: 2022-03-29 | End: 2022-03-28

## 2022-03-28 RX ORDER — SPIRONOLACTONE 100 MG/1
100 TABLET, FILM COATED ORAL DAILY
Qty: 30 TABLET | Refills: 0 | Status: SHIPPED | OUTPATIENT
Start: 2022-03-29 | End: 2022-03-28

## 2022-03-28 RX ORDER — MAGNESIUM OXIDE 400 MG/1
400 TABLET ORAL 2 TIMES DAILY
Qty: 60 TABLET | Refills: 0 | Status: SHIPPED | OUTPATIENT
Start: 2022-03-28 | End: 2022-03-28

## 2022-03-28 RX ORDER — FUROSEMIDE 40 MG
40 TABLET ORAL
Qty: 60 TABLET | Refills: 0 | Status: SHIPPED | OUTPATIENT
Start: 2022-03-28 | End: 2022-03-28

## 2022-03-28 RX ORDER — LACTULOSE 10 G/15ML
30 SOLUTION ORAL DAILY
Status: DISCONTINUED | OUTPATIENT
Start: 2022-03-28 | End: 2022-03-28 | Stop reason: HOSPADM

## 2022-03-28 RX ORDER — LACTULOSE 10 G/15ML
30 SOLUTION ORAL DAILY
Qty: 1350 ML | Refills: 0 | Status: SHIPPED | OUTPATIENT
Start: 2022-03-29 | End: 2022-04-28

## 2022-03-28 RX ORDER — TRAZODONE HYDROCHLORIDE 50 MG/1
50 TABLET, FILM COATED ORAL
Qty: 30 TABLET | Refills: 0 | Status: SHIPPED | OUTPATIENT
Start: 2022-03-28 | End: 2022-03-28

## 2022-03-28 RX ORDER — TRAZODONE HYDROCHLORIDE 50 MG/1
50 TABLET, FILM COATED ORAL
Qty: 30 TABLET | Refills: 0 | Status: SHIPPED | OUTPATIENT
Start: 2022-03-28 | End: 2024-05-02

## 2022-03-28 RX ORDER — SPIRONOLACTONE 100 MG/1
100 TABLET, FILM COATED ORAL DAILY
Qty: 30 TABLET | Refills: 0 | Status: SHIPPED | OUTPATIENT
Start: 2022-03-29 | End: 2023-10-30

## 2022-03-28 RX ADMIN — FUROSEMIDE 40 MG: 20 TABLET ORAL at 08:01

## 2022-03-28 RX ADMIN — LIDOCAINE HYDROCHLORIDE 5 ML: 20 SOLUTION ORAL; TOPICAL at 08:01

## 2022-03-28 RX ADMIN — LIDOCAINE HYDROCHLORIDE 5 ML: 20 SOLUTION ORAL; TOPICAL at 12:30

## 2022-03-28 RX ADMIN — PHYTONADIONE 10 MG: 10 INJECTION, EMULSION INTRAMUSCULAR; INTRAVENOUS; SUBCUTANEOUS at 09:08

## 2022-03-28 RX ADMIN — BENZOCAINE: 220 SPRAY, METERED PERIODONTAL at 09:41

## 2022-03-28 RX ADMIN — MAGNESIUM OXIDE TAB 400 MG (241.3 MG ELEMENTAL MG) 400 MG: 400 (241.3 MG) TAB at 08:01

## 2022-03-28 RX ADMIN — LACTULOSE 30 G: 10 SOLUTION ORAL at 09:41

## 2022-03-28 RX ADMIN — LIDOCAINE HYDROCHLORIDE 5 ML: 20 SOLUTION ORAL; TOPICAL at 16:31

## 2022-03-28 RX ADMIN — SPIRONOLACTONE 100 MG: 25 TABLET, FILM COATED ORAL at 08:01

## 2022-03-28 RX ADMIN — THERA TABS 1 TABLET: TAB at 08:01

## 2022-03-28 RX ADMIN — RIFAXIMIN 550 MG: 550 TABLET ORAL at 08:01

## 2022-03-28 RX ADMIN — BENZOCAINE: 220 SPRAY, METERED PERIODONTAL at 15:03

## 2022-03-28 RX ADMIN — FUROSEMIDE 40 MG: 20 TABLET ORAL at 17:33

## 2022-03-28 RX ADMIN — ACETAMINOPHEN 325 MG: 325 TABLET ORAL at 09:41

## 2022-03-28 ASSESSMENT — ACTIVITIES OF DAILY LIVING (ADL)
ADLS_ACUITY_SCORE: 8

## 2022-03-28 NOTE — PROGRESS NOTES
[facility]    Medicine Progress Note - Hospitalist Service    Date of Admission:  3/15/2022    Assessment & Plan        Marj Slater is a 61 year old female with history of liver cirrhosis presented to emergency department for evaluation of abdominal pain, increased girth, leg edema and weight gain.     Alcoholic liver cirrhosis with ascites, splenomegaly, portal hypertension-  History of hepatitis C-treated  Coagulopathy secondary to cirrhosis, thrombocytopenia  -CT 3/15: hepatic cirrhosis and steatosis portal hypertension including large volume ascites, mild splenic enlargement and small caliber upper abdominal varices  -History of hepatitis C treated last year  - last alcohol use October 2021  -Status post diagnostic and therapeutic paracentesis 3/15, 3.7 L removed.   - Continue furosemide and spironolactone. Change Lasix to oral on 3/24.  - Decrease lactulose to 20 g daily. Titrate for bowel movement 2-3 daily  - Intake and output and daily weight: has lost 34lb since admission.  - Ascites analysis shows no SBP. No indication for antibiotics.   - EGD on 3/30 as scheduled with [facility] outpatient  - Continue Rifaximin  -Monitoring coagulopathy and giving vitamin K as needed, no signs of bleeding  -INR remains elevated 2.43, platelets 133  -3/26: MELD-Na 28 points, 27-32% estimated 90-day mortality    Anasarca with lower extremity edema  Hyponatremia-improving, 132  -secondary to cirrhosis and volume overload related  -US shows no DVT  - Continue diuresis as above. Compression stocking.     Acute metabolic encephalopathy-component of hepatic encephalopathy with a mildly elevated ammonia, narcotic use earlier in stay  -CT head negative. Has mildly elevated ammonia and now improved.  -Continue lactulose and rifaximin as above  -Continue to hold Zoloft  -All narcotics have been stopped and will not be restarted  -She does not take narcotics outpatient   - Insomnia worsens her encephalopathy.  scheduled melatonin at bedtime, trazodone prn.     Possible EBV with hemolysis versus extravascular hemolysis due to enlarged spleen  Hyperbilirubinemia-improving  -3/25: , direct bilirubin 1.6, total bilirubin 9.2, reticulocyte 4.5% absolute reticulocyte 0.129, RADHA broad-spectrum positive, RADHA anti-IgG positive  -3/26:Total bilirubin 8.6  -Low haptoglobin, mildly elevated LDH, positive RADHA  -Hematology/oncology consulted: Checking for cold agglutinins, no treatment at this time pending results     Anemia of chronic disease   -hemoglobin stable. Monitor      Chronic thrombocytopenia- due to liver cirrhosis.   -Platelet count is relatively stable. Monitor     Hypokalemia: Replace per protocol     Hypomagnesemia: replace and recheck     Hyponatremia: sodium stable at 128-130. Due to liver cirrhosis. Monitor.     Tobacco use disorder: Encouraged complete cessation     Obesity: BMI 39.26       Diet: Combination Diet Low Saturated Fat Na <2400mg Diet  Room Service  Snacks/Supplements Adult: Magic Cup; With Meals  Fluid restriction 1200 ML FLUID    DVT Prophylaxis: VTE Prophylaxis contraindicated due to coagulopathy, le edema  Henderson Catheter: Not present  Central Lines: None  Cardiac Monitoring: None  Code Status: Full Code      Disposition Plan   Expected Discharge: 03/28/2022     Anticipated discharge location: home    Delays:     IV Medication - consider oral or Home Infusion  MD D/C Order   Lab Result Pending (enter specific test & time in comments)  Placement - TCU            The patient's care was discussed with the Patient and Patient's Family.    Radha Nina MD  Hospitalist Service  [facility]  Securely message with the Vocera Web Console (learn more here)  Text page via [employer] Paging/Directory         Clinically Significant Risk Factors Present on Admission                    ______________________________________________________________________    Interval History   Mrs.  Bryon is doing well today. She was complaining of pain in her tongue. I spoke about discharge with her and her sister.     Data reviewed today: I reviewed all medications, new labs and imaging results over the last 24 hours. I personally reviewed no images or EKG's today.    Physical Exam   Vital Signs: Temp: 97.9  F (36.6  C) Temp src: Oral BP: 116/56 Pulse: 69   Resp: 20 SpO2: 95 % O2 Device: None (Room air)    Weight: 200 lbs 9 oz  General Appearance: Awake, alert, in no acute distress  Respiratory: CTAB, no wheeze  Cardiovascular: RRR, no murmur noted  GI: soft, nontender,normal bowel sounds  Skin: + jaundice, no rash      Data   Recent Labs   Lab 03/27/22  0715 03/26/22  0712 03/25/22  1540 03/25/22  0739   WBC 8.8 8.3 9.6  --    HGB 9.2* 9.4* 9.6*  --    * 101* 103*  --    PLT 98* 133* 137*  --    INR 2.43* 2.43*  --  2.25*   * 132*  --  129*   POTASSIUM 3.6 3.6  --  4.1   CHLORIDE 98 96*  --  96*   CO2 25 26  --  22   BUN 11 10  --  8   CR 0.81 0.85  --  0.72   ANIONGAP 10 10  --  11   DAWIT 8.2* 8.3*  --  8.7    95  --  84   ALBUMIN 2.3* 2.4*  --  2.6*   PROTTOTAL 7.3 7.5  --  8.1*   BILITOTAL 7.9* 8.6*  --  9.2*   ALKPHOS 105 109  --  110   ALT 73* 79*  --  90*   * 148*  --  193*     Medications       - MEDICATION INSTRUCTIONS -   Does not apply See Admin Instructions     furosemide  40 mg Oral BID     lactulose  20 g Oral Daily     lidocaine (viscous)  5 mL Swish & Spit 4x Daily     magnesium oxide  400 mg Oral BID     melatonin  3 mg Oral At Bedtime     multivitamin, therapeutic  1 tablet Oral Daily     rifaximin  550 mg Oral BID     [Held by provider] sertraline  50 mg Oral Daily     sodium chloride (PF)  3 mL Intracatheter Q8H     spironolactone  100 mg Oral Daily

## 2022-03-28 NOTE — PROGRESS NOTES
Hematology chart check:    Labs are reviewed.  Hemoglobin is generally stable over the past week.  Not appear to be any clinical evidence of bleeding.  I discussed christiano her RADHA results with .  This test was mildly positive (+1 of 3) suggesting a low level antibody titer which may not be clinically significant.  We will continue to hold any treatment given her stable counts.  She is making reticulocytes suggesting a normal marrow response.  Urine hemosiderin is pending.  Will follow.

## 2022-03-28 NOTE — PROGRESS NOTES
Care Management Follow Up    Length of Stay (days): 13    Expected Discharge Date: 03/28/2022     Concerns to be Addressed:     Home with family  Patient plan of care discussed at interdisciplinary rounds: Yes    Anticipated Discharge Disposition: Home       Patient/family educated on Medicare website which has current facility and service quality ratings:  Yes  Education Provided on the Discharge Plan:  yes  Patient/Family in Agreement with the Plan:  Yes    Referrals Placed by CM/SW:  Referrals  Private pay costs discussed: Not applicable    Additional Information:  CM spoke to MD and the plan is for  pt to discharging home with sister.  MD asked CM to look into home care services. CM sent referrals to home care agencies. CM to follow medical progression, recommendations, and final discharge plan.      Stephanie Kim RN

## 2022-03-28 NOTE — PROGRESS NOTES
"[facility] DIGESTIVE HEALTH PROGRESS NOTE      Subjective:              She says she feels \"terrible\". Complains of dry mouth, discomfort in mouth and stomach, and dizziness. She does not think she is ready to discharge from hospital today.    Objective:                  Vital signs in last 24 hrs;  Temp:  [97.9  F (36.6  C)-98.3  F (36.8  C)] 98.1  F (36.7  C)  Pulse:  [69-84] 78  Resp:  [18-20] 18  BP: ()/(43-56) 113/53  SpO2:  [90 %-95 %] 90 %    Physical Exam:   General: Uncomfortable appearing. Awake.   Cardiovascular: Regular rate. No edema  Chest: Non-labored breathing. Symmetric chest rise.   Abdomen: soft, non-tender, non-distended, small ascites noted  Neurologic: Alert, no focal defects. +Asterixis    Current Labs:  CBC RESULTS: Recent Labs   Lab Test 03/27/22  0715   WBC 8.8   RBC 2.67*   HGB 9.2*   HCT 27.1*   *   MCH 34.5*   MCHC 33.9   RDW 17.2*   PLT 98*        CMP Results:   Recent Labs   Lab Test 03/27/22  0715   *   POTASSIUM 3.6   CHLORIDE 98   CO2 25   ANIONGAP 10      BUN 11   CR 0.81   BILITOTAL 7.9*   ALKPHOS 105   ALT 73*   *        INR Results:   Recent Labs   Lab Test 03/27/22  0715   INR 2.43*          Relevant Imaging:  Reviewed    61 year-old female with h/o Hepatitis C s/p treatment, cirrhosis, alcohol abuse admitted with LE edema. Now she is encephalopathic with rising LFTs.      1. Elevated LFTs  2. Encephalopathy  Cause unclear, though may simply be decompensated cirrhosis. EBV infection (few copies) which can lead to hemolysis (haptoglobin low, elevated indirect bili, smear with few spur cells/spherocytes). Now on lactulose 20 grams once daily and rifaximin. Was on narcotics from pain control, but now these have been held, pain team consulted. Extensive work-up otherwise normal/unremarkable. UA normal. Duplex US negative for thrombus or other abnormality. No ascites on US. Previous tap negative for SBP. WBC normal. Folate, Vit B12 normal. CMV negative. " PETH normal. Acetaminophen level normal. Blood cultures, C. Diff PCR negative. Hep C antibody positive as expected, but Hep B and A negative. Head CT without acute changes.      Bilirubin improved, and remains mostly indirect. EBV PCR mildly elevated (<500), haptoglobin low, elevated indirect bili, high LDH, peripheral smear shows evidence of hemolysis. Hematology has been consulted. RADHA positive, however likely does not need treatment at this time due to stable hemoglobin.  Given improving LFTs, will hold off on acyclovir for EBV.     +Asterixis on exam today. 3 soft BMs yesterday.     3. Cirrhosis with ascites  MELD-NA 27. Undergoing diuresis for anasarca. Reports sobriety since 2021, PETH negative. Needs liver clinic follow-up and screening EGD (twin scheduled 3/30/22 with plans to reschedule).    Ascites seems small on exam today. Do not plan to repeat paracentesis.      INR stable at 2.43.       Creatinine stable at 0.85 on oral furosemide and spironolactone.      4. Hyponatremia  2/2 cirrhosis.  yesterday. On 1200mL fluid restriction.      Plan:      1. Appreciate hematology's input   2. Continue to trend LFTs, INR  3. Increase lactulose to 30g once daily, titrate to 2-3 stools daily.   4. Continue rifaximin 550mg twice daily   5. Continue oral diuretics, currently on furosemide 40mg bid and spironolactone 100mg daily.   6. Low sodium diet. Will increase allowed water volume to 1500mL fluid restriction.  7. Outpatient liver clinic follow-up 5/12/22.   8. EGD for varix screening currently scheduled 3/30/22, we will plan to reschedule as patient will likely still be in TCU at that time.   9. Vitamin K 10 mg IV today    Tentative plan for discharge today. This is my first day meeting her. Labs seems stable and not an immediate threat, but she is hoping to remain in hospital another day. Making some adjustments to fluid restriction and meds. Could benefit from one more lab check and clinical monitoring  for one more day if OK with primary team, but not opposed to discharge if clinical gestalt is of more stability from providers that have been caring for her through the weekend.    Thank you so much, please call if questions.  >35 minutes spent with direct patient care, clinical decision making, education at bedside, care team coordination.        Enoc Pike MD  Thank you for the opportunity to participate in the care of this patient.   Please feel free to call me with any questions or concerns.  Phone number [phone].

## 2022-03-28 NOTE — PLAN OF CARE
Goal Outcome Evaluation:  Patient A&O x 4. Continues to c/o throat pain. Scheduled and PRN pain meds given with better relief today. Patient reports feeling weak today, wanting writer to help her get back in bed. Patient also c/o of shortness of breathe which was short lived;pt in trend position at the time to boost her in bed. Appetite better today. Lymp wraps changed by OT; edema seems to be resolving. On 1500 ml FR . K and Mag protocol with recheck in the A.M

## 2022-03-28 NOTE — PLAN OF CARE
Goal Outcome Evaluation:  Pt rates pain at a 5, GI signed off, pt ready to discharge home with sister.  Waiting on Rx's to come from pharmacy.  Will review paperwork with pt and her sister Ifeanyi, pt will go via wheelchair.  Joan Silva RN

## 2022-03-29 ENCOUNTER — PATIENT OUTREACH (OUTPATIENT)
Dept: CARE COORDINATION | Facility: CLINIC | Age: 62
End: 2022-03-29
Payer: COMMERCIAL

## 2022-03-29 DIAGNOSIS — Z71.89 OTHER SPECIFIED COUNSELING: ICD-10-CM

## 2022-03-29 LAB — HEMOSIDERIN UR QL MICRO: NORMAL

## 2022-03-29 NOTE — PROGRESS NOTES
Clinic Care Coordination Contact  [facility]/Voicemail       Clinical Data: Care Coordinator Outreach  Outreach attempted x 1.  Left message with sister who stated she was napping but doing well. Care Coordinator will try to reach patient again in 1-2 business days.      Stephanie Urena  [phone]  Care

## 2022-03-29 NOTE — PLAN OF CARE
Physical Therapy Discharge Summary    Reason for therapy discharge:    Discharged to home.   Family to assist  Progress towards therapy goal(s). See goals on Care Plan in [facility] electronic health record for goal details.  Goals partially met.  Barriers to achieving goals:   PT does recommend the pt have assist with mobility at home.  .Pt was making progress with mobiliity    Therapy recommendation(s):    Pt to have assist from hier sister.     Goal Outcome Evaluation:

## 2022-03-30 NOTE — PROGRESS NOTES
Clinic Care Coordination Contact  [facility]: Post-Discharge Note  SITUATION                                                      Admission:    Admission Date: 03/15/22   Reason for Admission: Alcoholic cirrhosis of liver with ascites  Discharge:   Discharge Date: 03/28/22  Discharge Diagnosis: Alcoholic cirrhosis of liver with ascites    BACKGROUND                                                      Per hospital discharge summary and inpatient provider notes:  Marj Slater is a 61 year old female admitted on 3/15/2022. She came to the emergency department for evaluation of abdominal pain, increased girth, leg edema, weight gain.     1.  Anasarca secondary to cirrhosis and portal hypertension  CT showed hepatic cirrhosis and steatosis portal hypertension including large volume ascites, mild splenic enlargement and small caliber upper abdominal varices  History of hepatitis C treated last year, last alcohol use October 2021  Status post diagnostic and therapeutic paracentesis, 3.7 L removed  We will add cytology and check alpha-fetoprotein  Start IV furosemide and spironolactone  Meld score 23  GI consult     2.  Rule out SBP  Ascites fluid  but Gram stain showed gram-positive cocci  Start IV ceftriaxone until SBP is ruled out     3.  Coagulopathy  Secondary to liver disease  Monitor for signs of acute blood loss     4.  Jaundice  Secondary to cirrhosis  Total bilirubin 6, up from 4.5 on 10/31/2021  Alk phos 137, stable from before  CT showed no bile duct dilatation, a calcified stone in the otherwise normal appearing gallbladder     5.  Anemia, thrombocytopenia  Platelet count is relatively stable  Hemoglobin 10.1, down from 12.9 on 10/31/2021  No signs of acute blood loss, monitor counts     6.  Hypokalemia  Replace per protocol     7.  Tobacco use disorder  Encouraged complete cessation    ASSESSMENT      Enrollment  Primary Care Care Coordination Status: Not a Candidate    Discharge  "Assessment  How are you doing now that you are home?: \" I'm doing okay\"  How are your symptoms? (Red Flag symptoms escalate to triage hotline per guidelines): Unchanged  Do you feel your condition is stable enough to be safe at home until your provider visit?: Yes  Does the patient have their discharge instructions? : Yes  Does the patient have questions regarding their discharge instructions? : No  Were you started on any new medications or were there changes to any of your previous medications? : Yes  Does the patient have all of their medications?: Yes  Do you have questions regarding any of your medications? : No  Do you have all of your needed medical supplies or equipment (DME)?  (i.e. oxygen tank, CPAP, cane, etc.): Yes  Discharge follow-up appointment scheduled within 14 calendar days? : Yes  Discharge Follow Up Appointment Date: 04/01/22  Discharge Follow Up Appointment Scheduled with?: Specialty Care Provider    Post-op ([facility] Only)  If the patient had a surgery or procedure, do they have any questions for a nurse?: No             PLAN                                                      Outpatient Plan:   Your activity upon discharge: activity as tolerated  Follow this diet upon discharge: Orders Placed This Encounter  Room Service  Snacks/Supplements Adult: Magic Cup; With Meals  Fluid restriction 1500 ML FLUID  Combination Diet Low Saturated Fat Na <2400mg Diet  Your medications have changed  Do  Activity instructions  Diet instructions  Your Next Steps  Marj Slater (MRN: [id number])   Printed at 3/28/22 5:05 PM Page 2 of 6  Follow-up and recommended labs and tests  Follow up with primary care provider, [facility], within 7 days to evaluate medication change, to  evaluate treatment change, and for hospital follow- up. The following labs/tests are recommended: LFTs, Ammonia, CBC  with platelets.  Home Care Referral  Your provider has ordered home health services. If you have " not been contacted within 2 days of your discharge please  call the inpatient department phone number at [phone] .  No future appointments.      For any urgent concerns, please contact our 24 hour nurse triage line: [phone] ([phone])         Stephanie Urena MA

## 2022-03-30 NOTE — PLAN OF CARE
Lymphedema Discharge Summary    Reason for therapy discharge:    Discharged to transitional care facility.    Progress towards therapy goal(s). See goals on Care Plan in [facility] electronic health record for goal details.  Goals partially met.  Barriers to achieving goals:   discharge from facility.    Therapy recommendation(s):    Continued therapy is recommended.  Rationale/Recommendations:  to improve edema..

## 2022-03-31 NOTE — DISCHARGE SUMMARY
[facility]  Hospitalist Discharge Summary      Date of Admission:  3/15/2022  Date of Discharge:  3/28/2022  6:30 PM  Discharging Provider: Radha Nina MD  Discharge Service: Hospitalist Service    Discharge Diagnoses   Alcoholic liver cirrhosis with ascites, splenomegaly, portal hypertension, and coagulopathy with anasarca and hyponatremia    Follow-ups Needed After Discharge   Follow-up Appointments     Follow-up and recommended labs and tests       Follow up with primary care provider, [facility],   within 7 days to evaluate medication change, to evaluate treatment change,   and for hospital follow- up.  The following labs/tests are recommended:   LFTs, Ammonia, CBC with platelets.             Unresulted Labs Ordered in the Past 30 Days of this Admission     No orders found from 2/13/2022 to 3/16/2022.      These results will be followed up by Radha Nina    Discharge Disposition   Discharged to home  Condition at discharge: Stable    Hospital Course   Marj Slater is a 61 year old female with history of liver cirrhosis presented to emergency department for evaluation of abdominal pain, increased girth, leg edema and weight gain.     Alcoholic liver cirrhosis with ascites, splenomegaly, portal hypertension-  History of hepatitis C-treated  Coagulopathy secondary to cirrhosis, thrombocytopenia  -CT 3/15: hepatic cirrhosis and steatosis portal hypertension including large volume ascites, mild splenic enlargement and small caliber upper abdominal varices  -History of hepatitis C treated last year  - last alcohol use October 2021  -Status post diagnostic and therapeutic paracentesis 3/15, 3.7 L removed.   - Continue furosemide and spironolactone. Change Lasix to oral on 3/24.  - lactulose to 20 g daily. Titrate for bowel movement 2-3 daily  - Intake and output and daily weight: has lost 34lb since admission.  - Ascites analysis shows no SBP. No  indication for antibiotics.   - EGD on 3/30 as scheduled with [facility] outpatient  - Continue Rifaximin  -Monitoring coagulopathy and giving vitamin K as needed, no signs of bleeding  -3/26: MELD-Na 28 points, 27-32% estimated 90-day mortality     Anasarca with lower extremity edema  Hyponatremia-improving, 132  -secondary to cirrhosis and volume overload related  -US shows no DVT  - Compression stocking.  Lasix and spironolactone as above     Acute metabolic encephalopathy-component of hepatic encephalopathy with a mildly elevated ammonia, narcotic use earlier in stay  -CT head negative. Has mildly elevated ammonia and now improved.  -Continue lactulose and rifaximin as above  -Continue to hold Zoloft  -All narcotics have been stopped and will not be restarted  -She does not take narcotics outpatient   - Insomnia worsens her encephalopathy. scheduled melatonin at bedtime, trazodone prn.      Possible EBV with hemolysis versus extravascular hemolysis due to enlarged spleen  Hyperbilirubinemia-improving  -3/25: , direct bilirubin 1.6, total bilirubin 9.2, reticulocyte 4.5% absolute reticulocyte 0.129, RADHA broad-spectrum positive, RADHA anti-IgG positive  -3/26:Total bilirubin 8.6  -Low haptoglobin, mildly elevated LDH, positive RADHA  -Hematology/oncology consulted     Anemia of chronic disease   -hemoglobin stable. Monitor      Chronic thrombocytopenia- due to liver cirrhosis.   -Platelet count is relatively stable. Monitor     Hypokalemia: Replace per protocol     Hypomagnesemia: replace and recheck     Hyponatremia: sodium stable at 128-130. Due to liver cirrhosis. Monitor.     Tobacco use disorder: Encouraged complete cessation    Consultations This Hospital Stay   GASTROENTEROLOGY IP CONSULT  CARE MANAGEMENT / SOCIAL WORK IP CONSULT  NUTRITION SERVICES ADULT IP CONSULT  PAIN MANAGEMENT ADULT IP CONSULT  PHYSICAL THERAPY ADULT IP CONSULT  OCCUPATIONAL THERAPY ADULT IP CONSULT  LYMPHEDEMA THERAPY IP  CONSULT  HEMATOLOGY & ONCOLOGY IP CONSULT  ADDICTION MEDICINE INPATIENT CONSULT    Code Status   Prior    Time Spent on this Encounter   I, Radha Nina MD, personally saw the patient today and spent greater than 30 minutes discharging this patient.       Radha Nina MD  [address] [address]  Phone: [phone]  Fax: [phone]  ______________________________________________________________________    Physical Exam   Vital Signs:                    Weight: 200 lbs 1.6 oz  General Appearance: Awake, alert, in no acute distress  Respiratory: CTAB, no wheeze  Cardiovascular: RRR, no murmur noted  GI: soft, nontender, non distended, normal bowel sounds  Skin: Jaundice, no rash         Primary Care Physician   [facility]    Discharge Orders      Home Care Referral      Reason for your hospital stay    Alcoholic liver cirrhosis with ascites, splenomegaly, portal hypertension, coagulopathy, anasarca, hyponatremia, and anemia     Follow-up and recommended labs and tests     Follow up with primary care provider, [facility], within 7 days to evaluate medication change, to evaluate treatment change, and for hospital follow- up.  The following labs/tests are recommended: LFTs, Ammonia, CBC with platelets.     Activity    Your activity upon discharge: activity as tolerated     Diet    Follow this diet upon discharge: Orders Placed This Encounter      Room Service      Snacks/Supplements Adult: Magic Cup; With Meals      Fluid restriction 1500 ML FLUID      Combination Diet Low Saturated Fat Na <2400mg Diet       Significant Results and Procedures   Most Recent 3 CBC's:Recent Labs   Lab Test 03/28/22  0722 03/27/22  0715 03/26/22  0712   WBC 7.8 8.8 8.3   HGB 9.5* 9.2* 9.4*   * 102* 101*   * 98* 133*     Most Recent 3 BMP's:Recent Labs   Lab Test 03/28/22  0722 03/27/22  0715 03/26/22  0712     133* 132*   POTASSIUM 4.0 3.6 3.6   CHLORIDE 102 98 96*   CO2 27 25 26   BUN 10 11 10   CR 0.79 0.81 0.85   ANIONGAP 7 10 10   DAWIT 8.2* 8.2* 8.3*   GLC 92 106 95     Most Recent 2 LFT's:Recent Labs   Lab Test 03/28/22  0722 03/27/22  0715   AST 97* 116*   ALT 70* 73*   ALKPHOS 116 105   BILITOTAL 6.5* 7.9*     Most Recent 3 INR's:Recent Labs   Lab Test 03/27/22  0715 03/26/22  0712 03/25/22  0739   INR 2.43* 2.43* 2.25*   ,   Results for orders placed or performed during the hospital encounter of 03/15/22   CT Abdomen Pelvis w Contrast    Narrative    EXAM: CT ABDOMEN PELVIS W CONTRAST  LOCATION: [facility]  DATE/TIME: 3/15/2022 6:26 PM    INDICATION: Abdominal distention.  COMPARISON: None.  TECHNIQUE: CT scan of the abdomen and pelvis was performed following injection of IV contrast. Multiplanar reformats were obtained. Dose reduction techniques were used.  CONTRAST: Isovue 370, 100 mL.    FINDINGS:   LOWER CHEST: Visualized lungs are clear. No pleural effusion. Heart size normal with no pericardial effusion.     HEPATOBILIARY: Hepatic cirrhosis and steatosis. No liver mass. No bile duct dilatation. A calcified stone in the otherwise normal appearing gallbladder.    PANCREAS: Normal.    SPLEEN: Spleen is enlarged at 14 cm.    ADRENAL GLANDS: Normal.    KIDNEY/BLADDER: Nonobstructing 2 mm right renal stone.  Kidneys, ureters and bladder are otherwise normal.    BOWEL: Large volume ascites. Colonic diverticulosis. Bowel is otherwise normal with no obstruction or inflammatory change.    LYMPH NODES: No lymphadenopathy.    VASCULATURE: Normal caliber abdominal aorta.  Main portal vein patent. Small caliber upper abdominal varices.     PELVIC ORGANS: No pelvic mass or fluid.    MUSCULOSKELETAL: Unremarkable.      Impression    IMPRESSION:   1.  Hepatic cirrhosis and steatosis portal hypertension including large volume ascites, mild splenic enlargement and small caliber upper abdominal  varices.  2.  Cholelithiasis.   US Paracentesis    Narrative    EXAM:  1. PARACENTESIS  2. ULTRASOUND GUIDANCE  LOCATION: [facility]  DATE/TIME: 3/15/2022 8:37 PM    INDICATION: Ascites.    PROCEDURE: Informed consent obtained. Time out performed. The abdomen was prepped and draped in a sterile fashion. 10 mL of 1% lidocaine was infused into local soft tissues. A 5 Ecuadorean catheter system was introduced into the abdominal ascites under   ultrasound guidance.    3.7 liters of clear fluid were removed and sent to the lab.    Patient tolerated procedure well.    Ultrasound imaging was obtained and placed in the patient's permanent medical record.      Impression    IMPRESSION:  1.  Status post ultrasound-guided paracentesis.    Reference CPT Code: 48896   US Lower Extremity Venous Duplex Bilateral    Narrative    EXAM: US LOWER EXTREMITY VENOUS DUPLEX BILATERAL  LOCATION: [facility]  DATE/TIME: 3/17/2022 11:22 AM    INDICATION: bilateral lower leg pain and swelling. Ruling out DVT.  COMPARISON: None.  TECHNIQUE: Venous Duplex ultrasound of bilateral lower extremities with and without compression, augmentation and duplex. Color flow and spectral Doppler with waveform analysis performed.    FINDINGS: Exam includes the common femoral, femoral, popliteal veins as well as segmentally visualized deep calf veins and greater saphenous vein.    Overall difficult study due to patient's swelling.    RIGHT: No deep vein thrombosis. No superficial thrombophlebitis. No popliteal cyst.    LEFT: No deep vein thrombosis. No superficial thrombophlebitis. No popliteal cyst.      Impression    IMPRESSION:  1.  No deep venous thrombosis in the bilateral lower extremities.   US Abdomen Limited w Abd/Pelvis Duplex Complete    Narrative    EXAM: US ABDOMEN LIMITED WITH DOPPLER COMPLETE  LOCATION: [facility]  DATE/TIME: 3/21/2022 1:53 PM    INDICATION: HIGH VOLUME  paracentesis with or without diagnostic fluid analysis with labs to be drawn if ordered. Total paracentesis volume as much as possible.  COMPARISON: 3/15/2022 CT  TECHNIQUE: Complete abdominal ultrasound. Color flow with spectral Doppler and waveform analysis performed.     FINDINGS:    GALLBLADDER: Single gallstone. Upper normal wall thickness of 3 mm.     BILE DUCTS: No biliary dilatation. The common duct measures 6 mm.    LIVER: Coarsened echotexture, suggesting underlying fibrosis. Nodular contour. No focal mass.     RIGHT KIDNEY: Normal size. Normal echogenicity with no hydronephrosis or mass.     LEFT KIDNEY: Normal size. No hydronephrosis.    SPLEEN: Enlarged measuring 13.5 cm craniocaudal    PANCREAS: The visualized portions are normal.    AORTA: Normal in caliber.    IVC: Normal where visualized.    Trace ascites.    ABDOMINAL DUPLEX: The hepatic artery, right and middle hepatic veins, IVC, portal veins, and splenic vein are patent with flow in the normal direction. The left hepatic vein could not be visualized.      Impression    IMPRESSION:  1.  Cirrhosis.  2.  Portal hypertension with splenomegaly and ascites.  3.  Cholelithiasis.  4.  Left hepatic vein not visualized, otherwise normal abdominal liver duplex.         CT Head w/o Contrast    Narrative    EXAM: CT HEAD W/O CONTRAST  LOCATION: [facility]  DATE/TIME: 3/22/2022 6:46 PM    INDICATION: Confusion, hepatic encephalopathy  COMPARISON: 10/31/2021 head CT  TECHNIQUE: Routine CT Head without IV contrast. Multiplanar reformats. Dose reduction techniques were used.    FINDINGS:  INTRACRANIAL CONTENTS: No intracranial hemorrhage, extraaxial collection, or mass effect.  No CT evidence of acute infarct. Small focus of hypoattenuation within the left hemipons may represent a chronic pontine  infarction. Normal parenchymal   attenuation. Normal ventricles and sulci.     VISUALIZED ORBITS/SINUSES/MASTOIDS: No intraorbital  abnormality. No paranasal sinus mucosal disease. No middle ear or mastoid effusion.    BONES/SOFT TISSUES: No acute abnormality.      Impression    IMPRESSION:  1.  No acute intracranial process.       Discharge Medications   Discharge Medication List as of 3/28/2022  5:05 PM      CONTINUE these medications which have CHANGED    Details   furosemide (LASIX) 40 MG tablet Take 1 tablet (40 mg) by mouth 2 times daily, Disp-60 tablet, R-0, E-Prescribe      lactulose (CHRONULAC) 10 GM/15ML solution Take 45 mLs (30 g) by mouth daily, Disp-1350 mL, R-0, E-Prescribe      magnesium oxide (MAG-OX) 400 MG tablet Take 1 tablet (400 mg) by mouth 2 times daily, Disp-60 tablet, R-0, E-Prescribe      rifaximin (XIFAXAN) 550 MG TABS tablet Take 1 tablet (550 mg) by mouth 2 times daily, Disp-60 tablet, R-0, E-Prescribe      spironolactone (ALDACTONE) 100 MG tablet Take 1 tablet (100 mg) by mouth daily, Disp-30 tablet, R-0, E-Prescribe      traZODone (DESYREL) 50 MG tablet Take 1 tablet (50 mg) by mouth nightly as needed for sleep, Disp-30 tablet, R-0, E-Prescribe         STOP taking these medications       sertraline (ZOLOFT) 50 MG tablet Comments:   Reason for Stopping:             Allergies   No Known Allergies

## 2022-04-12 ENCOUNTER — TRANSFERRED RECORDS (OUTPATIENT)
Dept: HEALTH INFORMATION MANAGEMENT | Facility: CLINIC | Age: 62
End: 2022-04-12
Payer: COMMERCIAL

## 2022-04-12 LAB
ALT SERPL-CCNC: 49 LU/L (ref 0–32)
AST SERPL-CCNC: 69 LU/L (ref 0–40)
CREATININE (EXTERNAL): 0.72 MG/DL (ref 0.57–1)
GFR ESTIMATED (EXTERNAL): 94 ML/MIN/1.73
GLUCOSE (EXTERNAL): 106 MG/DL (ref 65–99)
HEP C HIM: NORMAL
INR (EXTERNAL): 1.6 (ref 0.9–1.2)
POTASSIUM (EXTERNAL): 4.5 MMOL/L (ref 3.5–5.2)

## 2022-05-22 ENCOUNTER — HEALTH MAINTENANCE LETTER (OUTPATIENT)
Age: 62
End: 2022-05-22

## 2022-05-28 NOTE — TELEPHONE ENCOUNTER
RECORDS RECEIVED FROM:    [employer] Date: 06.07.2022   NOTES STATUS DETAILS   OFFICE NOTE from referring provider     OFFICE NOTES from other specialists Care Everywhere 05.18.2022 Sha Vargas MBBS      04.04.2022 Joan Trammell, APRN, CNP     DISCHARGE SUMMARY from hospital Internal  03.15.2022 Radha Nina MD     MEDICATION LIST Care Everywhere / Internal    LIVER BIOSPY (IF APPLICABLE)      PATHOLOGY REPORTS      IMAGING     ENDOSCOPY (IF AVAILABLE) Care Everywhere 01.28.2021   COLONOSCOPY (IF AVAILABLE)     ULTRASOUND LIVER Internal 03.21.2022 US ABDOMEN LIMITED WITH DOPPLER    CT OF ABDOMEN Internal 03.15.2022 CT ABDOMEN PELVIS W CONTRAST   MRI OF LIVER     FIBROSCAN, US ELASTOGRAPHY, FIBROSIS SCAN, MR ELASTOGRAPHY     LABS     HEPATIC PANEL (LIVER PANEL) Internal 04.04.2022   BASIC METABOLIC PANEL Internal 04.04.2022   COMPLETE METABOLIC PANEL Care Everywhere 05.18.2022   COMPLETE BLOOD COUNT (CBC) Care Everywhere 05.18.2022   INTERNATIONAL NORMALIZED RATIO (INR) Internal 03.27.2022   HEPATITIS C ANTIBODY     HEPATITIS C VIRAL LOAD/PCR     HEPATITIS C GENOTYPE     HEPATITIS B SURFACE ANTIGEN     HEPATITIS B SURFACE ANTIBODY     HEPATITIS B DNA QUANT LEVEL     HEPATITIS B CORE ANTIBODY       Action 06.03.2022 RM   Action Taken Records in [facility]

## 2022-06-07 ENCOUNTER — REFERRAL (OUTPATIENT)
Dept: TRANSPLANT | Facility: CLINIC | Age: 62
End: 2022-06-07

## 2022-06-07 ENCOUNTER — OFFICE VISIT (OUTPATIENT)
Dept: GASTROENTEROLOGY | Facility: CLINIC | Age: 62
End: 2022-06-07
Attending: INTERNAL MEDICINE
Payer: COMMERCIAL

## 2022-06-07 ENCOUNTER — PRE VISIT (OUTPATIENT)
Dept: GASTROENTEROLOGY | Facility: CLINIC | Age: 62
End: 2022-06-07
Payer: COMMERCIAL

## 2022-06-07 VITALS
OXYGEN SATURATION: 97 % | WEIGHT: 205.3 LBS | TEMPERATURE: 97.6 F | HEART RATE: 78 BPM | DIASTOLIC BLOOD PRESSURE: 69 MMHG | BODY MASS INDEX: 34.16 KG/M2 | SYSTOLIC BLOOD PRESSURE: 122 MMHG

## 2022-06-07 DIAGNOSIS — K70.30 ALCOHOLIC CIRRHOSIS (H): ICD-10-CM

## 2022-06-07 DIAGNOSIS — K70.31 ALCOHOLIC CIRRHOSIS OF LIVER WITH ASCITES (H): ICD-10-CM

## 2022-06-07 DIAGNOSIS — B19.20 HCV (HEPATITIS C VIRUS): Primary | ICD-10-CM

## 2022-06-07 DIAGNOSIS — E55.9 VITAMIN D DEFICIENCY: ICD-10-CM

## 2022-06-07 DIAGNOSIS — K76.6 PORTAL HYPERTENSION (H): ICD-10-CM

## 2022-06-07 DIAGNOSIS — K70.31 ALCOHOLIC CIRRHOSIS OF LIVER WITH ASCITES (H): Primary | ICD-10-CM

## 2022-06-07 DIAGNOSIS — B19.20 HEPATITIS C: ICD-10-CM

## 2022-06-07 PROCEDURE — G0463 HOSPITAL OUTPT CLINIC VISIT: HCPCS

## 2022-06-07 PROCEDURE — 99205 OFFICE O/P NEW HI 60 MIN: CPT | Performed by: INTERNAL MEDICINE

## 2022-06-07 ASSESSMENT — PAIN SCALES - GENERAL: PAINLEVEL: MILD PAIN (3)
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
## 2022-06-07 NOTE — NURSING NOTE
Chief Complaint   Patient presents with     New Patient       /69   Pulse 78   Temp 97.6  F (36.4  C) (Oral)   Wt 93.1 kg (205 lb 4.8 oz)   SpO2 97%   BMI 34.16 kg/m      Gonzalo Zamora on 6/7/2022 at 8:15 AM
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
## 2022-06-07 NOTE — LETTER
6/28/2022    Marj Slater  [address]  [place] [address]          Dear Marj,    Thank you for your interest in the Transplant Center at [facility]. We look forward to being a part of your care team and assisting you through the transplant process.    As we discussed, your transplant coordinator is Lewis Carrizales Jr. (Liver).  You may call your coordinator at [phone].    Please complete the following.    1. Fill out and return the enclosed forms    Authorization for Electronic Communication    Authorization to Discuss Protected Health Information    Authorization for Release of Protected Health Information    Authorization for Care Everywhere Release of Information    2. Sign up for:    [employer], access to your electronic medical record (see enclosed pamphlet)    [email or web address], a transplant education website    You can use these tools to learn more about your transplant, communicate with your care team, and track your medical details      Sincerely,    Solid Organ Transplant  [facility]    cc: Referring Physician and PCP

## 2022-06-09 VITALS — HEIGHT: 64 IN | WEIGHT: 198 LBS | BODY MASS INDEX: 33.8 KG/M2

## 2022-06-09 NOTE — TELEPHONE ENCOUNTER
Patient was asked the following questions during liver intake call.     Referring Provider: Dr. Sha Vargas  Referring Diagnosis: Alcoholic Cirrhosis of the Liver / HCV  PCP: Joan Trammell      1)Do you know why you have liver disease: Yes       If Alcoholic Cirrhosis is present when was your last drink: October 2021       Have you ever been through treatment for alcohol: No  2) Presence of Ascites: No Paracentesis: No  3) Presence of Hepatic Encephalopathy: No Medications: No  4) History of GI Bleeding: No  5) Oxygen Use: None  6) EGD: Yes @ [facility]  7) Colonoscopy: Yes @ [facility]   8) MELD Score:  18  9) Labs available for review from PCP/GI: Yes  10)HCC Diagnosis: No                11)Insurance information: [employer]                Policy [place]: Self              Subscriber/Policy/ID Number: [id number]             Group Number:     Referral intake process completed.  Patient is aware that after financial approval is received, medical records will be requested.   Patient confirmed for a callback from transplant coordinator on 6/16/22.  Tentative evaluation date TBD.    Confirmed coordinator will discuss evaluation process in more detail at the time of their call.   Patient is aware of the need to arrange age appropriate cancer screening, vaccinations, and dental care.  Reminded patient to complete questionnaire, complete medical records release, and review packet prior to evaluation visit .  Assessed patient for special needs (ie--wheelchair, assistance, guardian, and ):  None  Patient instructed to call [phone] with questions.     Patient gave verbal consent during intake call to obtain medical records and documents outside of [facility]:  Yes     KAYLEIGH Cardona, LPN       Solid Organ Transplant

## 2022-06-15 ENCOUNTER — LAB (OUTPATIENT)
Dept: LAB | Facility: HOSPITAL | Age: 62
End: 2022-06-15
Payer: COMMERCIAL

## 2022-06-15 DIAGNOSIS — K70.31 ALCOHOLIC CIRRHOSIS OF LIVER WITH ASCITES (H): ICD-10-CM

## 2022-06-15 LAB
ALBUMIN SERPL-MCNC: 2.7 G/DL (ref 3.5–5)
ALP SERPL-CCNC: 146 U/L (ref 45–120)
ALT SERPL W P-5'-P-CCNC: 33 U/L (ref 0–45)
ANION GAP SERPL CALCULATED.3IONS-SCNC: 6 MMOL/L (ref 5–18)
AST SERPL W P-5'-P-CCNC: 49 U/L (ref 0–40)
BILIRUB SERPL-MCNC: 4.2 MG/DL (ref 0–1)
BUN SERPL-MCNC: 16 MG/DL (ref 8–22)
CALCIUM SERPL-MCNC: 8.8 MG/DL (ref 8.5–10.5)
CHLORIDE BLD-SCNC: 100 MMOL/L (ref 98–107)
CO2 SERPL-SCNC: 26 MMOL/L (ref 22–31)
CREAT SERPL-MCNC: 1.09 MG/DL (ref 0.6–1.1)
ERYTHROCYTE [DISTWIDTH] IN BLOOD BY AUTOMATED COUNT: 15.5 % (ref 10–15)
GFR SERPL CREATININE-BSD FRML MDRD: 57 ML/MIN/1.73M2
GLUCOSE BLD-MCNC: 108 MG/DL (ref 70–125)
HCT VFR BLD AUTO: 32.3 % (ref 35–47)
HGB BLD-MCNC: 10.7 G/DL (ref 11.7–15.7)
INR PPP: 1.69 (ref 0.85–1.15)
MCH RBC QN AUTO: 34.1 PG (ref 26.5–33)
MCHC RBC AUTO-ENTMCNC: 33.1 G/DL (ref 31.5–36.5)
MCV RBC AUTO: 103 FL (ref 78–100)
PLATELET # BLD AUTO: 122 10E3/UL (ref 150–450)
POTASSIUM BLD-SCNC: 3.9 MMOL/L (ref 3.5–5)
PROT SERPL-MCNC: 8.7 G/DL (ref 6–8)
RBC # BLD AUTO: 3.14 10E6/UL (ref 3.8–5.2)
SODIUM SERPL-SCNC: 132 MMOL/L (ref 136–145)
WBC # BLD AUTO: 4.5 10E3/UL (ref 4–11)

## 2022-06-15 PROCEDURE — 85027 COMPLETE CBC AUTOMATED: CPT

## 2022-06-15 PROCEDURE — 36415 COLL VENOUS BLD VENIPUNCTURE: CPT

## 2022-06-15 PROCEDURE — 85610 PROTHROMBIN TIME: CPT

## 2022-06-15 PROCEDURE — 80053 COMPREHEN METABOLIC PANEL: CPT

## 2022-06-15 PROCEDURE — 87522 HEPATITIS C REVRS TRNSCRPJ: CPT

## 2022-06-16 RX ORDER — FUROSEMIDE 20 MG
40 TABLET ORAL DAILY
Qty: 180 TABLET | Refills: 1 | Status: CANCELLED | OUTPATIENT
Start: 2022-06-16

## 2022-06-16 NOTE — TELEPHONE ENCOUNTER
Referring Provider: Dr. Sha Vargas  Referring Diagnosis: Alcoholic Cirrhosis of the Liver / HCV  PCP: Joan Trammell      Not working x 1 yr  NEEDS to apply for SSDI  Worked at [employer] x 12 yr    Sister Ashley is main support    Dx of liver dx: approx 15-20 yrs with HCV, not cirrhosis  - HCV SVR recently Mavyert    Sober date: 10/31/21 - had a bad fall and determined that was enough  - Been going to AA since end of November  - Tiffanie'd with [facility] to start there now     Ascites - yes  Taps - x 1  HE - no  GIB - no  EGD - [facility] recently   Colon - approx 8 yrs ago at [facility]    Head - no  Heart - no  Lungs - current smoker (3 cigs/day), was 1 ppd x 25-30 yrs  Kidneys - no   Cancer - no    PAP - about 2 yrs ago  mammo - 11/2021    Plan: patient has already seen Dr. Quezada on 6/7/22 in clinic, therefore, only needs to see txp sx, sw, and diet on a NON-eval day. However, see dates below she is not available on.   Look to do eval on 7/20 or 7/21, but 7/13 or 7/14 will work as well if better for patient     Vacation 6/17/22 - 7/10/22  Dental on 7/18  [facility] on 7/11 & 7/12

## 2022-06-18 LAB — HCV RNA SERPL NAA+PROBE-ACNC: NOT DETECTED IU/ML

## 2022-06-27 NOTE — TELEPHONE ENCOUNTER
Referring Provider: Dr. Sha Vargas  Referring Diagnosis: Alcoholic Cirrhosis of the Liver / HCV  PCP: Joan Trammell       Not working x 1 yr  NEEDS to apply for SSDI  Worked at [employer] x 12 yr     Sister Ashley is main support     Dx of liver dx: approx 15-20 yrs with HCV, not cirrhosis  - HCV SVR recently Mavyert     Sober date: 10/31/21 - had a bad fall and determined that was enough  - Been going to AA since end of November  - Tiffanie'd with [facility] to start there now      Ascites - yes  Taps - x 1  HE - no  GIB - no  EGD - [facility] recently   Colon - approx 8 yrs ago at [facility]     Head - no  Heart - no  Lungs - current smoker (3 cigs/day), was 1 ppd x 25-30 yrs  Kidneys - no   Cancer - no     PAP - about 2 yrs ago  mammo - 11/2021     Plan: patient has already seen Dr. Quezada on 6/7/22 in clinic, therefore, only needs to see txp sx, sw, and diet on a NON-eval day. However, see dates below she is not available on.   Look to do eval on 7/20 or 7/21, but 7/13 or 7/14 will work as well if better for patient      Vacation 6/17/22 - 7/10/22  Dental on 7/18  [facility] on 7/11 & 7/12

## 2022-07-08 ENCOUNTER — TELEPHONE (OUTPATIENT)
Dept: TRANSPLANT | Facility: CLINIC | Age: 62
End: 2022-07-08

## 2022-07-08 NOTE — TELEPHONE ENCOUNTER
Left message for patient to confirm scheduled Liver Transplant Evaluation appointments on 7/14/22, 7/21/22, 7/28/22, 8/25/22, 9/6/22, 9/28/22.  Asked patient to call back to confirm appointments dates and times.

## 2022-07-11 ENCOUNTER — TRANSFERRED RECORDS (OUTPATIENT)
Dept: HEALTH INFORMATION MANAGEMENT | Facility: CLINIC | Age: 62
End: 2022-07-11

## 2022-07-13 ENCOUNTER — HOSPITAL ENCOUNTER (OUTPATIENT)
Dept: GENERAL RADIOLOGY | Facility: HOSPITAL | Age: 62
Discharge: HOME | End: 2022-07-13
Attending: INTERNAL MEDICINE | Admitting: INTERNAL MEDICINE
Payer: COMMERCIAL

## 2022-07-13 DIAGNOSIS — B19.20 HCV (HEPATITIS C VIRUS): ICD-10-CM

## 2022-07-13 DIAGNOSIS — K70.31 ALCOHOLIC CIRRHOSIS OF LIVER WITH ASCITES (H): ICD-10-CM

## 2022-07-13 DIAGNOSIS — B19.20 HEPATITIS C: ICD-10-CM

## 2022-07-13 DIAGNOSIS — K76.6 PORTAL HYPERTENSION (H): ICD-10-CM

## 2022-07-13 DIAGNOSIS — K70.30 ALCOHOLIC CIRRHOSIS (H): ICD-10-CM

## 2022-07-13 PROCEDURE — 71046 X-RAY EXAM CHEST 2 VIEWS: CPT | Mod: FY

## 2022-07-21 ENCOUNTER — OFFICE VISIT (OUTPATIENT)
Dept: TRANSPLANT | Facility: CLINIC | Age: 62
End: 2022-07-21
Attending: INTERNAL MEDICINE
Payer: COMMERCIAL

## 2022-07-21 ENCOUNTER — LAB (OUTPATIENT)
Dept: LAB | Facility: CLINIC | Age: 62
End: 2022-07-21
Attending: INTERNAL MEDICINE
Payer: COMMERCIAL

## 2022-07-21 ENCOUNTER — ANCILLARY PROCEDURE (OUTPATIENT)
Dept: ULTRASOUND IMAGING | Facility: CLINIC | Age: 62
End: 2022-07-21
Attending: INTERNAL MEDICINE
Payer: COMMERCIAL

## 2022-07-21 VITALS
SYSTOLIC BLOOD PRESSURE: 134 MMHG | DIASTOLIC BLOOD PRESSURE: 71 MMHG | OXYGEN SATURATION: 97 % | HEIGHT: 65 IN | WEIGHT: 200.5 LBS | BODY MASS INDEX: 33.41 KG/M2 | HEART RATE: 84 BPM

## 2022-07-21 DIAGNOSIS — K76.6 PORTAL HYPERTENSION (H): ICD-10-CM

## 2022-07-21 DIAGNOSIS — K70.30 ALCOHOLIC CIRRHOSIS (H): ICD-10-CM

## 2022-07-21 DIAGNOSIS — K70.31 ALCOHOLIC CIRRHOSIS OF LIVER WITH ASCITES (H): ICD-10-CM

## 2022-07-21 DIAGNOSIS — B18.2 CHRONIC HEPATITIS C WITHOUT HEPATIC COMA (H): ICD-10-CM

## 2022-07-21 DIAGNOSIS — B19.20 HEPATITIS C: ICD-10-CM

## 2022-07-21 DIAGNOSIS — B19.20 HCV (HEPATITIS C VIRUS): ICD-10-CM

## 2022-07-21 LAB
ABO/RH(D): NORMAL
AFP SERPL-MCNC: 4.2 NG/ML
ALBUMIN SERPL-MCNC: 2.6 G/DL (ref 3.4–5)
ALBUMIN UR-MCNC: NEGATIVE MG/DL
ALP SERPL-CCNC: 181 U/L (ref 40–150)
ALT SERPL W P-5'-P-CCNC: 40 U/L (ref 0–50)
ANION GAP SERPL CALCULATED.3IONS-SCNC: 7 MMOL/L (ref 3–14)
ANTIBODY SCREEN: NEGATIVE
ANTIBODY TITER IGM SCREEN: POSITIVE
APPEARANCE UR: CLEAR
AST SERPL W P-5'-P-CCNC: 53 U/L (ref 0–45)
B IGG TITR SERPL: 32 {TITER}
B IGM TITR SERPL: 64 {TITER}
BILIRUB DIRECT SERPL-MCNC: 1.2 MG/DL (ref 0–0.2)
BILIRUB SERPL-MCNC: 3.2 MG/DL (ref 0.2–1.3)
BILIRUB UR QL STRIP: NEGATIVE
BUN SERPL-MCNC: 19 MG/DL (ref 7–30)
CALCIUM SERPL-MCNC: 8.6 MG/DL (ref 8.5–10.1)
CHLORIDE BLD-SCNC: 103 MMOL/L (ref 94–109)
CHOLEST SERPL-MCNC: 224 MG/DL
CO2 SERPL-SCNC: 26 MMOL/L (ref 20–32)
COLOR UR AUTO: YELLOW
CREAT SERPL-MCNC: 0.93 MG/DL (ref 0.52–1.04)
CREAT UR-MCNC: 122 MG/DL
DEPRECATED CALCIDIOL+CALCIFEROL SERPL-MC: 11 UG/L (ref 20–75)
ERYTHROCYTE [DISTWIDTH] IN BLOOD BY AUTOMATED COUNT: 15 % (ref 10–15)
FASTING STATUS PATIENT QL REPORTED: NO
FERRITIN SERPL-MCNC: 73 NG/ML (ref 8–252)
GFR SERPL CREATININE-BSD FRML MDRD: 69 ML/MIN/1.73M2
GLUCOSE BLD-MCNC: 131 MG/DL (ref 70–99)
GLUCOSE UR STRIP-MCNC: NEGATIVE MG/DL
HCG SERPL QL: NEGATIVE
HCT VFR BLD AUTO: 30.8 % (ref 35–47)
HDLC SERPL-MCNC: 66 MG/DL
HGB BLD-MCNC: 10.5 G/DL (ref 11.7–15.7)
HGB UR QL STRIP: NEGATIVE
HIV 1+2 AB+HIV1 P24 AG SERPL QL IA: NONREACTIVE
INR PPP: 1.7 (ref 0.85–1.15)
KETONES UR STRIP-MCNC: NEGATIVE MG/DL
LDLC SERPL CALC-MCNC: 143 MG/DL
LEUKOCYTE ESTERASE UR QL STRIP: NEGATIVE
MCH RBC QN AUTO: 33.4 PG (ref 26.5–33)
MCHC RBC AUTO-ENTMCNC: 34.1 G/DL (ref 31.5–36.5)
MCV RBC AUTO: 98 FL (ref 78–100)
NITRATE UR QL: NEGATIVE
NONHDLC SERPL-MCNC: 158 MG/DL
PH UR STRIP: 6 [PH] (ref 5–7)
PHOSPHATE SERPL-MCNC: 3.5 MG/DL (ref 2.5–4.5)
PLATELET # BLD AUTO: 119 10E3/UL (ref 150–450)
POTASSIUM BLD-SCNC: 4 MMOL/L (ref 3.4–5.3)
PROT SERPL-MCNC: 8.1 G/DL (ref 6.8–8.8)
PROT UR-MCNC: 0.07 G/L
PROT/CREAT 24H UR: 0.06 G/G CR (ref 0–0.2)
RBC # BLD AUTO: 3.14 10E6/UL (ref 3.8–5.2)
SODIUM SERPL-SCNC: 136 MMOL/L (ref 133–144)
SP GR UR STRIP: 1.01 (ref 1–1.03)
SPECIMEN EXPIRATION DATE: NORMAL
SPECIMEN EXPIRATION DATE: NORMAL
T PALLIDUM AB SER QL: NONREACTIVE
TRIGL SERPL-MCNC: 77 MG/DL
UROBILINOGEN UR STRIP-MCNC: NORMAL MG/DL
WBC # BLD AUTO: 6 10E3/UL (ref 4–11)

## 2022-07-21 PROCEDURE — 85610 PROTHROMBIN TIME: CPT | Performed by: PATHOLOGY

## 2022-07-21 PROCEDURE — 86886 COOMBS TEST INDIRECT TITER: CPT | Mod: 90 | Performed by: PATHOLOGY

## 2022-07-21 PROCEDURE — 99000 SPECIMEN HANDLING OFFICE-LAB: CPT | Performed by: PATHOLOGY

## 2022-07-21 PROCEDURE — 99203 OFFICE O/P NEW LOW 30 MIN: CPT | Performed by: TRANSPLANT SURGERY

## 2022-07-21 PROCEDURE — 82728 ASSAY OF FERRITIN: CPT | Performed by: PATHOLOGY

## 2022-07-21 PROCEDURE — 80061 LIPID PANEL: CPT | Performed by: PATHOLOGY

## 2022-07-21 PROCEDURE — 94375 RESPIRATORY FLOW VOLUME LOOP: CPT | Performed by: INTERNAL MEDICINE

## 2022-07-21 PROCEDURE — 84100 ASSAY OF PHOSPHORUS: CPT | Performed by: PATHOLOGY

## 2022-07-21 PROCEDURE — 36415 COLL VENOUS BLD VENIPUNCTURE: CPT | Performed by: PATHOLOGY

## 2022-07-21 PROCEDURE — 80053 COMPREHEN METABOLIC PANEL: CPT | Performed by: PATHOLOGY

## 2022-07-21 PROCEDURE — 86780 TREPONEMA PALLIDUM: CPT | Mod: 90 | Performed by: PATHOLOGY

## 2022-07-21 PROCEDURE — 84703 CHORIONIC GONADOTROPIN ASSAY: CPT | Performed by: PATHOLOGY

## 2022-07-21 PROCEDURE — 94729 DIFFUSING CAPACITY: CPT | Performed by: INTERNAL MEDICINE

## 2022-07-21 PROCEDURE — 80321 ALCOHOLS BIOMARKERS 1OR 2: CPT | Mod: 90 | Performed by: PATHOLOGY

## 2022-07-21 PROCEDURE — 80307 DRUG TEST PRSMV CHEM ANLYZR: CPT | Mod: 90 | Performed by: PATHOLOGY

## 2022-07-21 PROCEDURE — 93975 VASCULAR STUDY: CPT | Mod: GC | Performed by: STUDENT IN AN ORGANIZED HEALTH CARE EDUCATION/TRAINING PROGRAM

## 2022-07-21 PROCEDURE — 86481 TB AG RESPONSE T-CELL SUSP: CPT | Mod: 90 | Performed by: PATHOLOGY

## 2022-07-21 PROCEDURE — 94726 PLETHYSMOGRAPHY LUNG VOLUMES: CPT | Performed by: INTERNAL MEDICINE

## 2022-07-21 PROCEDURE — 82248 BILIRUBIN DIRECT: CPT | Performed by: PATHOLOGY

## 2022-07-21 PROCEDURE — 82105 ALPHA-FETOPROTEIN SERUM: CPT | Mod: 90 | Performed by: PATHOLOGY

## 2022-07-21 PROCEDURE — 86905 BLOOD TYPING RBC ANTIGENS: CPT | Mod: 90 | Performed by: PATHOLOGY

## 2022-07-21 PROCEDURE — 85027 COMPLETE CBC AUTOMATED: CPT | Performed by: PATHOLOGY

## 2022-07-21 PROCEDURE — 86901 BLOOD TYPING SEROLOGIC RH(D): CPT | Mod: 90 | Performed by: PATHOLOGY

## 2022-07-21 PROCEDURE — 86900 BLOOD TYPING SEROLOGIC ABO: CPT | Mod: 90 | Performed by: PATHOLOGY

## 2022-07-21 PROCEDURE — 86870 RBC ANTIBODY IDENTIFICATION: CPT | Mod: 90 | Performed by: PATHOLOGY

## 2022-07-21 PROCEDURE — 82306 VITAMIN D 25 HYDROXY: CPT | Mod: 90 | Performed by: PATHOLOGY

## 2022-07-21 PROCEDURE — 86850 RBC ANTIBODY SCREEN: CPT | Mod: 90 | Performed by: PATHOLOGY

## 2022-07-21 RX ORDER — FOLIC ACID 1 MG/1
TABLET ORAL
COMMUNITY
Start: 2022-07-06

## 2022-07-21 RX ORDER — MAGNESIUM OXIDE 400 MG/1
400 TABLET ORAL 2 TIMES DAILY
COMMUNITY
Start: 2022-04-04 | End: 2023-04-28

## 2022-07-21 RX ORDER — DOCUSATE SODIUM 100 MG/1
100 CAPSULE, LIQUID FILLED ORAL 2 TIMES DAILY
COMMUNITY
Start: 2022-04-04

## 2022-07-21 ASSESSMENT — PAIN SCALES - GENERAL: PAINLEVEL: NO PAIN (0)

## 2022-07-21 NOTE — NURSING NOTE
"Chief Complaint   Patient presents with     Transplant Evaluation     Liver     Blood pressure 134/71, pulse 84, height 1.657 m (5' 5.25\"), weight 90.9 kg (200 lb 8 oz), SpO2 97 %.    Karen Qiu, FAISAL    "
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
## 2022-07-22 LAB
DLCOUNC-%PRED-PRE: 78 %
DLCOUNC-PRE: 16.29 ML/MIN/MMHG
DLCOUNC-PRED: 20.67 ML/MIN/MMHG
ERV-%PRED-PRE: 92 %
ERV-PRE: 0.47 L
ERV-PRED: 0.51 L
ETHYL GLUCURONIDE UR QL SCN: NEGATIVE NG/ML
EXPTIME-PRE: 8.11 SEC
FEF2575-%PRED-PRE: 82 %
FEF2575-PRE: 1.8 L/SEC
FEF2575-PRED: 2.19 L/SEC
FEFMAX-%PRED-PRE: 80 %
FEFMAX-PRE: 5.04 L/SEC
FEFMAX-PRED: 6.28 L/SEC
FEV1-%PRED-PRE: 92 %
FEV1-PRE: 2.29 L
FEV1FEV6-PRE: 77 %
FEV1FEV6-PRED: 80 %
FEV1FVC-PRE: 75 %
FEV1FVC-PRED: 79 %
FEV1SVC-PRE: 75 %
FEV1SVC-PRED: 75 %
FIFMAX-PRE: 4.43 L/SEC
FRCPLETH-%PRED-PRE: 82 %
FRCPLETH-PRE: 2.28 L
FRCPLETH-PRED: 2.76 L
FVC-%PRED-PRE: 95 %
FVC-PRE: 3.05 L
FVC-PRED: 3.18 L
GAMMA INTERFERON BACKGROUND BLD IA-ACNC: 0.04 IU/ML
IC-%PRED-PRE: 91 %
IC-PRE: 2.57 L
IC-PRED: 2.82 L
M TB IFN-G BLD-IMP: NEGATIVE
M TB IFN-G CD4+ BCKGRND COR BLD-ACNC: 2.24 IU/ML
MITOGEN IGNF BCKGRD COR BLD-ACNC: 0.02 IU/ML
MITOGEN IGNF BCKGRD COR BLD-ACNC: 0.04 IU/ML
QUANTIFERON MITOGEN: 2.28 IU/ML
QUANTIFERON NIL TUBE: 0.04 IU/ML
QUANTIFERON TB1 TUBE: 0.06 IU/ML
QUANTIFERON TB2 TUBE: 0.08
RVPLETH-%PRED-PRE: 90 %
RVPLETH-PRE: 1.8 L
RVPLETH-PRED: 2 L
TLCPLETH-%PRED-PRE: 95 %
TLCPLETH-PRE: 4.85 L
TLCPLETH-PRED: 5.11 L
VA-%PRED-PRE: 88 %
VA-PRE: 4.42 L
VC-%PRED-PRE: 91 %
VC-PRE: 3.05 L
VC-PRED: 3.33 L

## 2022-07-22 RX ORDER — CHOLECALCIFEROL (VITAMIN D3) 50 MCG
1 TABLET ORAL DAILY
Qty: 90 TABLET | Refills: 3 | Status: SHIPPED | OUTPATIENT
Start: 2022-10-14 | End: 2023-04-25

## 2022-07-22 RX ORDER — ERGOCALCIFEROL 1.25 MG/1
50000 CAPSULE, LIQUID FILLED ORAL WEEKLY
Qty: 12 CAPSULE | Refills: 0 | OUTPATIENT
Start: 2022-07-22 | End: 2022-10-08

## 2022-07-23 LAB
ANTIBODY ID: NORMAL
ATRIAL RATE - MUSE: 71 BPM
DIASTOLIC BLOOD PRESSURE - MUSE: NORMAL MMHG
INTERPRETATION ECG - MUSE: NORMAL
P AXIS - MUSE: 26 DEGREES
PR INTERVAL - MUSE: 172 MS
QRS DURATION - MUSE: 76 MS
QT - MUSE: 428 MS
QTC - MUSE: 465 MS
R AXIS - MUSE: 20 DEGREES
SPECIMEN EXPIRATION DATE: NORMAL
SYSTOLIC BLOOD PRESSURE - MUSE: NORMAL MMHG
T AXIS - MUSE: 37 DEGREES
VENTRICULAR RATE- MUSE: 71 BPM

## 2022-07-24 LAB
M AG RBC QL: POSITIVE
PLPETH BLD-MCNC: 11 NG/ML
POPETH BLD-MCNC: 10 NG/ML
SPECIMEN EXPIRATION DATE: NORMAL
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
## 2022-07-26 ENCOUNTER — COMMITTEE REVIEW (OUTPATIENT)
Dept: TRANSPLANT | Facility: CLINIC | Age: 62
End: 2022-07-26

## 2022-07-26 DIAGNOSIS — K76.6 PORTAL HYPERTENSION (H): ICD-10-CM

## 2022-07-26 DIAGNOSIS — K70.31 ALCOHOLIC CIRRHOSIS OF LIVER WITH ASCITES (H): ICD-10-CM

## 2022-07-26 NOTE — COMMITTEE REVIEW
Abdominal Committee Review Note     Evaluation Date: 7/20/2022  Committee Review Date: 7/26/2022    Organ being evaluated for: Liver    Transplant Phase: Evaluation  Transplant Status: Active    Transplant Coordinator: Lewis Carrizales Jr.  Transplant Surgeon:   Jaime Olson    Referring Physician: Sha Vargas    Primary Diagnosis: Alcoholic Cirrhosis with Hepatitis C  Secondary Diagnosis:     Committee Review Members:  Clinic Karen Daniels, ADRYAN   Licensed Mental Health Chase Urbina, [facility]   Nutrition Virginia Goins, RD   Pharmacist John Delacruz, [facility]    - Clinical Morales Cesar, [facility]   Transplant Bonnie Mendez LPN, Alyssa Herrera, RN, Opal Del Rosario, ADRYAN, Yoli Smith MD, Vince Angelo MD, Jr Lewis Carrizales, ADRYAN, Stephanie Acuna MD, Xuan Lee, ADRYAN, Xuan Rodríguez, NP, Martine Ridley, APRN CNP, Shaan Jeter MD, Heriberto Salcedo, RN   Transplant Hepatology  Trent Sierra MD, Michelle Perdue MD, Meng Quezada MD   Transplant Surgery Jaime Olson MD, Elina Zamora PA-C, [facility] Amanda Juarez MD, Too Stafford MD, Lincoln Garcia MD       Transplant Eligibility: Cirrhosis with MELD, ETOH    Committee Review Decision: Needs Re-presentation    Relative Contraindications: Other, pending evaluation    Absolute Contraindications: None    Committee Chair Shaan Montaño MD verbally attested to the committee's decision.    Committee Discussion Details:     Re-discuss pending full txp eval:    Needs to stop smoking  CD evaluation and clearance from social work    winnie'd to see pulmonary end of Sept

## 2022-07-28 ENCOUNTER — HOSPITAL ENCOUNTER (OUTPATIENT)
Dept: CARDIOLOGY | Facility: CLINIC | Age: 62
Discharge: HOME | End: 2022-07-28
Attending: INTERNAL MEDICINE
Payer: COMMERCIAL

## 2022-07-28 VITALS — DIASTOLIC BLOOD PRESSURE: 56 MMHG | HEART RATE: 46 BPM | SYSTOLIC BLOOD PRESSURE: 104 MMHG

## 2022-07-28 DIAGNOSIS — K70.31 ALCOHOLIC CIRRHOSIS OF LIVER WITH ASCITES (H): ICD-10-CM

## 2022-07-28 DIAGNOSIS — K76.6 PORTAL HYPERTENSION (H): ICD-10-CM

## 2022-07-28 DIAGNOSIS — K70.30 ALCOHOLIC CIRRHOSIS (H): ICD-10-CM

## 2022-07-28 DIAGNOSIS — B19.20 HEPATITIS C: ICD-10-CM

## 2022-07-28 DIAGNOSIS — K70.31 ALCOHOLIC CIRRHOSIS OF LIVER WITH ASCITES (H): Primary | ICD-10-CM

## 2022-07-28 DIAGNOSIS — B19.20 HCV (HEPATITIS C VIRUS): ICD-10-CM

## 2022-07-28 DIAGNOSIS — R93.1 ABNORMAL CARDIAC CT ANGIOGRAPHY: ICD-10-CM

## 2022-07-28 LAB — LVEF ECHO: NORMAL

## 2022-07-28 PROCEDURE — 0503T CT FFR: CPT

## 2022-07-28 PROCEDURE — 250N000011 HC RX IP 250 OP 636: Performed by: INTERNAL MEDICINE

## 2022-07-28 PROCEDURE — 250N000013 HC RX MED GY IP 250 OP 250 PS 637: Performed by: INTERNAL MEDICINE

## 2022-07-28 PROCEDURE — 75574 CT ANGIO HRT W/3D IMAGE: CPT

## 2022-07-28 PROCEDURE — 0504T CT FFR: CPT | Performed by: INTERNAL MEDICINE

## 2022-07-28 PROCEDURE — 93306 TTE W/DOPPLER COMPLETE: CPT | Mod: 26 | Performed by: INTERNAL MEDICINE

## 2022-07-28 PROCEDURE — 75574 CT ANGIO HRT W/3D IMAGE: CPT | Mod: 26 | Performed by: INTERNAL MEDICINE

## 2022-07-28 PROCEDURE — 93306 TTE W/DOPPLER COMPLETE: CPT

## 2022-07-28 RX ORDER — IOPAMIDOL 755 MG/ML
500 INJECTION, SOLUTION INTRAVASCULAR ONCE
Status: COMPLETED | OUTPATIENT
Start: 2022-07-28 | End: 2022-07-28

## 2022-07-28 RX ORDER — METOPROLOL TARTRATE 1 MG/ML
5-15 INJECTION, SOLUTION INTRAVENOUS
Status: DISCONTINUED | OUTPATIENT
Start: 2022-07-28 | End: 2022-07-29 | Stop reason: HOSPADM

## 2022-07-28 RX ORDER — IVABRADINE 5 MG/1
5-15 TABLET, FILM COATED ORAL
Status: COMPLETED | OUTPATIENT
Start: 2022-07-28 | End: 2022-07-28

## 2022-07-28 RX ORDER — NITROGLYCERIN 0.4 MG/1
0.4 TABLET SUBLINGUAL
Status: DISCONTINUED | OUTPATIENT
Start: 2022-07-28 | End: 2022-07-29 | Stop reason: HOSPADM

## 2022-07-28 RX ORDER — ACYCLOVIR 200 MG/1
0-1 CAPSULE ORAL
Status: DISCONTINUED | OUTPATIENT
Start: 2022-07-28 | End: 2022-07-29 | Stop reason: HOSPADM

## 2022-07-28 RX ORDER — DILTIAZEM HCL 60 MG
120 TABLET ORAL
Status: DISCONTINUED | OUTPATIENT
Start: 2022-07-28 | End: 2022-07-29 | Stop reason: HOSPADM

## 2022-07-28 RX ORDER — DIPHENHYDRAMINE HYDROCHLORIDE 50 MG/ML
25-50 INJECTION INTRAMUSCULAR; INTRAVENOUS
Status: DISCONTINUED | OUTPATIENT
Start: 2022-07-28 | End: 2022-07-29 | Stop reason: HOSPADM

## 2022-07-28 RX ORDER — METOPROLOL TARTRATE 25 MG/1
25-100 TABLET, FILM COATED ORAL
Status: COMPLETED | OUTPATIENT
Start: 2022-07-28 | End: 2022-07-28

## 2022-07-28 RX ORDER — METHYLPREDNISOLONE SODIUM SUCCINATE 125 MG/2ML
125 INJECTION, POWDER, LYOPHILIZED, FOR SOLUTION INTRAMUSCULAR; INTRAVENOUS
Status: DISCONTINUED | OUTPATIENT
Start: 2022-07-28 | End: 2022-07-29 | Stop reason: HOSPADM

## 2022-07-28 RX ORDER — DIPHENHYDRAMINE HCL 25 MG
25 CAPSULE ORAL
Status: DISCONTINUED | OUTPATIENT
Start: 2022-07-28 | End: 2022-07-29 | Stop reason: HOSPADM

## 2022-07-28 RX ORDER — DILTIAZEM HYDROCHLORIDE 5 MG/ML
10-15 INJECTION INTRAVENOUS
Status: DISCONTINUED | OUTPATIENT
Start: 2022-07-28 | End: 2022-07-29 | Stop reason: HOSPADM

## 2022-07-28 RX ORDER — ONDANSETRON 2 MG/ML
4 INJECTION INTRAMUSCULAR; INTRAVENOUS
Status: DISCONTINUED | OUTPATIENT
Start: 2022-07-28 | End: 2022-07-29 | Stop reason: HOSPADM

## 2022-07-28 RX ADMIN — IOPAMIDOL 110 ML: 755 INJECTION, SOLUTION INTRAVENOUS at 10:40

## 2022-07-28 RX ADMIN — METOPROLOL TARTRATE 100 MG: 50 TABLET, FILM COATED ORAL at 08:30

## 2022-07-28 RX ADMIN — NITROGLYCERIN 0.4 MG: 0.4 TABLET SUBLINGUAL at 10:34

## 2022-07-28 RX ADMIN — IVABRADINE 10 MG: 5 TABLET, FILM COATED ORAL at 08:30

## 2022-08-03 NOTE — TELEPHONE ENCOUNTER
RECORDS RECEIVED FROM: internal    DATE RECEIVED: 9.28.22    NOTES STATUS DETAILS   OFFICE NOTE from referring provider internal  Meng Quezada   OFFICE NOTE from other specialist internal  clearance for possible liver txp    DISCHARGE SUMMARY from hospital     DISCHARGE REPORT from the ER     MEDICATION LIST internal     IMAGING  (NEED IMAGES AND REPORTS)     CT SCAN     CHEST XRAY (CXR) internal  7.13.22   TESTS     PULMONARY FUNCTION TESTING (PFT) internal  7/21/22

## 2022-08-22 ENCOUNTER — DOCUMENTATION ONLY (OUTPATIENT)
Dept: TRANSPLANT | Facility: CLINIC | Age: 62
End: 2022-08-22

## 2022-08-22 NOTE — PROGRESS NOTES
I received a copy of patient's health care directive and I forwarded it to Stacey Early for their review.    Marj also sent me her care giver agreement naming her sister Ashley and  Emanuel as here post transplant care givers.        NIRAV Caldera, [facility]  Liver Transplant   Phone [phone]  Pager [phone]

## 2022-08-25 ENCOUNTER — VIRTUAL VISIT (OUTPATIENT)
Dept: TRANSPLANT | Facility: CLINIC | Age: 62
End: 2022-08-25
Attending: INTERNAL MEDICINE
Payer: COMMERCIAL

## 2022-08-25 DIAGNOSIS — B19.20 HEPATITIS C: ICD-10-CM

## 2022-08-25 DIAGNOSIS — K70.31 ALCOHOLIC CIRRHOSIS OF LIVER WITH ASCITES (H): ICD-10-CM

## 2022-08-25 DIAGNOSIS — K70.30 ALCOHOLIC CIRRHOSIS (H): ICD-10-CM

## 2022-08-25 DIAGNOSIS — B19.20 HCV (HEPATITIS C VIRUS): ICD-10-CM

## 2022-08-25 DIAGNOSIS — K76.6 PORTAL HYPERTENSION (H): ICD-10-CM

## 2022-08-25 NOTE — PROGRESS NOTES
Pt evaluated via billable telephone visit d/t COVID-19 restrictions. Time spent: 15 min    [facility] Solid Organ Transplant  Outpatient MNT: Liver Transplant Evaluation    Current BMI: 33.1 (HT 65.25 in,  lbs/91 kg)  BMI is within recommendation of <45 for liver transplant     Time Spent: 15 minutes  Visit Type: Initial   Referring Physician: Wesley   Pt accompanied by: self     History of previous txp: none     Nutrition Assessment  Watching dietary sodium intake & calories- trying to lose weight. She reports the LS diet is going well.   Of note, she did have two teeth removed a few days ago, so some textures are limited now (prev was limited in the past d/t cracked teeth).     - Meal prep & grocery shopping: pt does; her sister's  helps with dinner   - Previous RD education: no    Vitamins, Supplements, Pertinent Meds: folic acid, magnesium  Herbal Medicines/Supplements: none   Protein supplement: none    Edema: none now    Weight hx:   - 185 when left the hospital (yet was deconditioned)  - reports prior typical weight was 225-230 lbs   - no reported muscle wasting    Wt Readings from Last 10 Encounters:   07/21/22 90.9 kg (200 lb 8 oz)   06/09/22 89.8 kg (198 lb)   06/07/22 93.1 kg (205 lb 4.8 oz)   03/28/22 90.8 kg (200 lb 1.6 oz)   10/31/21 100.2 kg (221 lb)     Diet Recall  Breakfast Cereal w/ banana or scrambled eggs w/ toast    Lunch Lean Cuisine, heart healthy chicken soup    Dinner Protein + veggie; spaghetti d/t having teeth removed   Snacks Sugar free popsicles, ?granola vs protein bars, Jello, occasional cheeto puffs, fruit     Beverages Coffee, fruit juice, milk w/ lunch, water, crystal light tea    Dining out Occasional pizza or Chinese - 1x/week or more      Physical Activity  Lower energy, but has improved   Still doing PT regimen - most days for 20 minutes   + walks daily for a few blocks, trying to increase stamina further as she has improved a lot, but not up to where she  wants to be    Previously was working 8 h/day on her feet, but no longer can do this     Nutrition Diagnosis  No nutrition diagnosis identified at this time     Nutrition Intervention  Nutrition education provided:  Discussed sodium intake (low sodium foods and drinks, seasoning food without salt and tips for low sodium diet).    Reviewed adequate protein intake. Encouraged receiving protein from both animal and plant based sources. Reviewed utilization of supplements, especially drinkable ones with recent tooth removal.     Reviewed post txp diet guidelines in brief (will review in further detail post txp):  (1) Review of proper food safety measures d/t immunosuppressant therapy post-op and increased risk for food-borne illness    (2) Avoid the following post txp d/t risk for rejection, unknown effects on the organs, and/or potential interactions with immunosuppressants:  - Herbal, Chinese, holistic, chiropractic, natural, alternative medicines and supplements  - Detoxes and cleanses  - Weight loss pills  - Protein powders or other products with extracts or herbs (ie green tea extract)    (3) Med regimen and possible side effects    Patient Understanding: Pt verbalized understanding of education provided.  Expected Engagement: Good  Follow-Up Plans: PRN     Nutrition Goals  No nutrition goals identified at this time    Virginia Goins, RD, LD, CCTD

## 2022-08-25 NOTE — LETTER
8/25/2022         RE: Marj Slater  [address]  [place] [address]        Dear Colleague,    Thank you for referring your patient, Marj Slater, to the [facility] TRANSPLANT [facility]INIC. Please see a copy of my visit note below.    Pt evaluated via billable telephone visit d/t COVID-19 restrictions. Time spent: 15 min    [facility] Solid Organ Transplant  Outpatient MNT: Liver Transplant Evaluation    Current BMI: 33.1 (HT 65.25 in,  lbs/91 kg)  BMI is within recommendation of <45 for liver transplant     Time Spent: 15 minutes  Visit Type: Initial   Referring Physician: Wesley   Pt accompanied by: self     History of previous txp: none     Nutrition Assessment  Watching dietary sodium intake & calories- trying to lose weight. She reports the  diet is going well.   Of note, she did have two teeth removed a few days ago, so some textures are limited now (prev was limited in the past d/t cracked teeth).     - Meal prep & grocery shopping: pt does; her sister's  helps with dinner   - Previous RD education: no    Vitamins, Supplements, Pertinent Meds: folic acid, magnesium  Herbal Medicines/Supplements: none   Protein supplement: none    Edema: none now    Weight hx:   - 185 when left the hospital (yet was deconditioned)  - reports prior typical weight was 225-230 lbs   - no reported muscle wasting    Wt Readings from Last 10 Encounters:   07/21/22 90.9 kg (200 lb 8 oz)   06/09/22 89.8 kg (198 lb)   06/07/22 93.1 kg (205 lb 4.8 oz)   03/28/22 90.8 kg (200 lb 1.6 oz)   10/31/21 100.2 kg (221 lb)     Diet Recall  Breakfast Cereal w/ banana or scrambled eggs w/ toast    Lunch Lean Cuisine, heart healthy chicken soup    Dinner Protein + veggie; spaghetti d/t having teeth removed   Snacks Sugar free popsicles, ?granola vs protein bars, Jello, occasional cheeto puffs, fruit     Beverages Coffee, fruit juice, milk w/ lunch, water, crystal light tea    Dining out Occasional pizza or  Chinese - 1x/week or more      Physical Activity  Lower energy, but has improved   Still doing PT regimen - most days for 20 minutes   + walks daily for a few blocks, trying to increase stamina further as she has improved a lot, but not up to where she wants to be    Previously was working 8 h/day on her feet, but no longer can do this     Nutrition Diagnosis  No nutrition diagnosis identified at this time     Nutrition Intervention  Nutrition education provided:  Discussed sodium intake (low sodium foods and drinks, seasoning food without salt and tips for low sodium diet).    Reviewed adequate protein intake. Encouraged receiving protein from both animal and plant based sources. Reviewed utilization of supplements, especially drinkable ones with recent tooth removal.     Reviewed post txp diet guidelines in brief (will review in further detail post txp):  (1) Review of proper food safety measures d/t immunosuppressant therapy post-op and increased risk for food-borne illness    (2) Avoid the following post txp d/t risk for rejection, unknown effects on the organs, and/or potential interactions with immunosuppressants:  - Herbal, Chinese, holistic, chiropractic, natural, alternative medicines and supplements  - Detoxes and cleanses  - Weight loss pills  - Protein powders or other products with extracts or herbs (ie green tea extract)    (3) Med regimen and possible side effects    Patient Understanding: Pt verbalized understanding of education provided.  Expected Engagement: Good  Follow-Up Plans: PRN     Nutrition Goals  No nutrition goals identified at this time    Virginia Goins, RD, LD, CCTD

## 2022-09-06 ENCOUNTER — VIRTUAL VISIT (OUTPATIENT)
Dept: CARDIOLOGY | Facility: CLINIC | Age: 62
End: 2022-09-06
Attending: INTERNAL MEDICINE
Payer: COMMERCIAL

## 2022-09-06 DIAGNOSIS — K76.6 PORTAL HYPERTENSION (H): ICD-10-CM

## 2022-09-06 DIAGNOSIS — K70.31 ALCOHOLIC CIRRHOSIS OF LIVER WITH ASCITES (H): ICD-10-CM

## 2022-09-06 DIAGNOSIS — I25.10 CORONARY ARTERY CALCIFICATION: Primary | ICD-10-CM

## 2022-09-06 DIAGNOSIS — I25.110 CORONARY ARTERY DISEASE INVOLVING NATIVE CORONARY ARTERY OF NATIVE HEART WITH UNSTABLE ANGINA PECTORIS (H): ICD-10-CM

## 2022-09-06 PROCEDURE — G0463 HOSPITAL OUTPT CLINIC VISIT: HCPCS | Mod: PN,RTG | Performed by: INTERNAL MEDICINE

## 2022-09-06 PROCEDURE — 99204 OFFICE O/P NEW MOD 45 MIN: CPT | Mod: GT | Performed by: INTERNAL MEDICINE

## 2022-09-06 NOTE — Clinical Note
9/6/2022      RE: Marj Slater  [address]  [place] [address]       Dear Colleague,    Thank you for the opportunity to participate in the care of your patient, Marj Slater, at the [facility] HEART CLINIC [facility]. Please see a copy of my visit note below.    No notes on file    Please do not hesitate to contact me if you have any questions/concerns.     Sincerely,     TAWNY Stoddard MD

## 2022-09-06 NOTE — PROGRESS NOTES
"Marj is a 61 year old who is being evaluated via a billable video visit.      How would you like to obtain your AVS? MyChart  If the video visit is dropped, the invitation should be resent by: Send to e-mail at: [email or web address]  Will anyone else be joining your video visit? No      Rick Kendall, Visit Facilitator/MA.      Video-Visit Details    Video Start Time: 12.24PM    Type of service:  Video Visit    Video End Time:12.37PM.    Originating Location (pt. Location): Home    Distant Location (provider location):  [facility]     Platform used for Video Visit: Isela Mcmahan is a 61 year old female here for liver transplant evaluation.  End-stage liver disease due to alcoholic cirrhosis.  HPI   Patient here for liver transplant evaluation.  End-stage liver disease due to alcoholic cirrhosis.  Patient used to work at [employer] currently retired.  Goes for regular walk and physiotherapy advised exercises daily.  No cardiac symptoms.  Patient's cardiac risk factors are current smoking, LDL of 145.  No hypertension or hyperlipidemia.  No premature coronary artery disease in family.  She has no prior cardiac history.    Review of Systems   Constitutional, HEENT, cardiovascular, pulmonary, gi and gu systems are negative, except as otherwise noted.      Objective    Vitals - Patient Reported  Weight (Patient Reported): 90.7 kg (200 lb)  Height (Patient Reported): 162.6 cm (5' 4\")  BMI (Based on Pt Reported Ht/Wt): 34.33  Pain Score: No Pain (0)        Physical Exam   GENERAL: Healthy, alert and no distress  EYES: Eyes grossly normal to inspection.  No discharge or erythema, or obvious scleral/conjunctival abnormalities.  RESP: No audible wheeze, cough, or visible cyanosis.  No visible retractions or increased work of breathing.    SKIN: Visible skin clear. No significant rash, abnormal pigmentation or lesions.  NEURO: Cranial nerves grossly intact.  Mentation and speech " appropriate for age.  PSYCH: Mentation appears normal, affect normal/bright, judgement and insight intact, normal speech and appearance well-groomed.    ASSESSMENT/PLAN:  Patient here for liver transplant evaluation.  End-stage liver disease due to alcoholic cirrhosis.  Currently patient is fairly active with no cardiac symptoms.  No prior cardiac history.  Cardiac risk factors are current smoking and LDL of 145.  Currently patient is not on any cardiac medication.  EKG reviewed normal sinus rhythm.  Normal EKG.  Patient's echocardiogram reviewed normal biventricular function.  No significant valvular abnormalities.  Normal PA pressure.  CT coronary angiogram reviewed.    CORONARY CALCIUM SCORE: The total Agatston calcium score is 559, Left  main: 61.2, left anterior descendin,  circumflex: 14.6, right  coronary artery: 303. This places the patient in the 97th percentile  when compared to age and gender matched control group.  Coronary angiographic showed no flow-limiting lesions with FFR.  As patient has no flow-limiting lesions and normal PA pressure she may proceed with transplant.  Discussed about risk factor modification especially quitting smoking and controlling lipids.  Also discussed diet exercise and weight loss.  Total visit duration 45 minutes.  This include video interview, chart review, review of EKGs echocardiograms coronary CT angiogram and documentation.

## 2022-09-06 NOTE — NURSING NOTE
Chief Complaint   Patient presents with     Consult     New consult for pre liver eval. Medications/allergies reviewed with pt. Pt states she is now taking a dulcolax every other day.      Patient denies any changes since check-in regarding medication and allergies and states all information entered during check-in remains accurate.    Rick Kendall, Visit Facilitator/MA.

## 2022-09-07 ENCOUNTER — OFFICE VISIT (OUTPATIENT)
Dept: GASTROENTEROLOGY | Facility: CLINIC | Age: 62
End: 2022-09-07
Attending: INTERNAL MEDICINE
Payer: COMMERCIAL

## 2022-09-07 ENCOUNTER — LAB (OUTPATIENT)
Dept: LAB | Facility: CLINIC | Age: 62
End: 2022-09-07
Payer: COMMERCIAL

## 2022-09-07 ENCOUNTER — ALLIED HEALTH/NURSE VISIT (OUTPATIENT)
Dept: TRANSPLANT | Facility: CLINIC | Age: 62
End: 2022-09-07
Attending: INTERNAL MEDICINE
Payer: COMMERCIAL

## 2022-09-07 VITALS
OXYGEN SATURATION: 100 % | WEIGHT: 201.4 LBS | TEMPERATURE: 98.2 F | SYSTOLIC BLOOD PRESSURE: 110 MMHG | BODY MASS INDEX: 33.26 KG/M2 | DIASTOLIC BLOOD PRESSURE: 65 MMHG | HEART RATE: 62 BPM

## 2022-09-07 DIAGNOSIS — K70.30 ALCOHOLIC CIRRHOSIS, UNSPECIFIED WHETHER ASCITES PRESENT (H): ICD-10-CM

## 2022-09-07 DIAGNOSIS — K76.6 PORTAL HYPERTENSION (H): ICD-10-CM

## 2022-09-07 DIAGNOSIS — B19.20 HEPATITIS C: ICD-10-CM

## 2022-09-07 DIAGNOSIS — B18.2 CHRONIC HEPATITIS C WITHOUT HEPATIC COMA (H): ICD-10-CM

## 2022-09-07 DIAGNOSIS — K70.31 ALCOHOLIC CIRRHOSIS OF LIVER WITH ASCITES (H): ICD-10-CM

## 2022-09-07 DIAGNOSIS — K70.30 ALCOHOLIC CIRRHOSIS (H): ICD-10-CM

## 2022-09-07 DIAGNOSIS — B19.20 HCV (HEPATITIS C VIRUS): ICD-10-CM

## 2022-09-07 LAB
ALBUMIN SERPL BCG-MCNC: 2.9 G/DL (ref 3.5–5.2)
ALP SERPL-CCNC: 132 U/L (ref 35–104)
ALT SERPL W P-5'-P-CCNC: 27 U/L (ref 10–35)
ANION GAP SERPL CALCULATED.3IONS-SCNC: 9 MMOL/L (ref 7–15)
AST SERPL W P-5'-P-CCNC: 47 U/L (ref 10–35)
BILIRUB DIRECT SERPL-MCNC: 1.1 MG/DL (ref 0–0.3)
BILIRUB SERPL-MCNC: 3.5 MG/DL
BUN SERPL-MCNC: 14 MG/DL (ref 8–23)
CALCIUM SERPL-MCNC: 8.7 MG/DL (ref 8.8–10.2)
CHLORIDE SERPL-SCNC: 102 MMOL/L (ref 98–107)
CREAT SERPL-MCNC: 0.9 MG/DL (ref 0.51–0.95)
DEPRECATED HCO3 PLAS-SCNC: 23 MMOL/L (ref 22–29)
ERYTHROCYTE [DISTWIDTH] IN BLOOD BY AUTOMATED COUNT: 15.2 % (ref 10–15)
GFR SERPL CREATININE-BSD FRML MDRD: 71 ML/MIN/1.73M2
GLUCOSE SERPL-MCNC: 101 MG/DL (ref 70–99)
HCT VFR BLD AUTO: 29.9 % (ref 35–47)
HGB BLD-MCNC: 10.1 G/DL (ref 11.7–15.7)
INR PPP: 1.58 (ref 0.85–1.15)
MCH RBC QN AUTO: 33.4 PG (ref 26.5–33)
MCHC RBC AUTO-ENTMCNC: 33.8 G/DL (ref 31.5–36.5)
MCV RBC AUTO: 99 FL (ref 78–100)
PLATELET # BLD AUTO: 105 10E3/UL (ref 150–450)
POTASSIUM SERPL-SCNC: 3.7 MMOL/L (ref 3.4–5.3)
PROT SERPL-MCNC: 7.6 G/DL (ref 6.4–8.3)
RBC # BLD AUTO: 3.02 10E6/UL (ref 3.8–5.2)
SODIUM SERPL-SCNC: 134 MMOL/L (ref 136–145)
WBC # BLD AUTO: 4.9 10E3/UL (ref 4–11)

## 2022-09-07 PROCEDURE — 82248 BILIRUBIN DIRECT: CPT | Performed by: PATHOLOGY

## 2022-09-07 PROCEDURE — 99214 OFFICE O/P EST MOD 30 MIN: CPT | Performed by: INTERNAL MEDICINE

## 2022-09-07 PROCEDURE — 80053 COMPREHEN METABOLIC PANEL: CPT | Performed by: PATHOLOGY

## 2022-09-07 PROCEDURE — 85610 PROTHROMBIN TIME: CPT | Performed by: PATHOLOGY

## 2022-09-07 PROCEDURE — 99000 SPECIMEN HANDLING OFFICE-LAB: CPT | Performed by: PATHOLOGY

## 2022-09-07 PROCEDURE — 85027 COMPLETE CBC AUTOMATED: CPT | Performed by: PATHOLOGY

## 2022-09-07 PROCEDURE — G0463 HOSPITAL OUTPT CLINIC VISIT: HCPCS

## 2022-09-07 PROCEDURE — 80321 ALCOHOLS BIOMARKERS 1OR 2: CPT | Mod: 90 | Performed by: PATHOLOGY

## 2022-09-07 PROCEDURE — 36415 COLL VENOUS BLD VENIPUNCTURE: CPT | Performed by: PATHOLOGY

## 2022-09-07 PROCEDURE — 99207 PR NO CHARGE COORDINATED CARE PS: CPT

## 2022-09-07 ASSESSMENT — PAIN SCALES - GENERAL: PAINLEVEL: NO PAIN (0)

## 2022-09-07 NOTE — LETTER
9/7/2022         RE: Marj Slater  [address]  [place] [address]        Dear Colleague,    Thank you for referring your patient, Marj Slater, to the [facility] HEPATOLOGY CLINIC [place]. Please see a copy of my visit note below.    [facility] Hepatology    Follow-up Visit    CHIEF COMPLAINT AND REASON FOR THE VISIT:  Hepatitis C and alcohol related cirrhosis.    CONSULTING HEALTHCARE PROVIDER:  Sha Vargas MD, Oncology/Hematology [facility].    SUBJECTIVE:  Ms. Majr Slater is a 61-year-old female with history of alcohol abuse and related chronic liver disease.  She also has a history of hepatitis C and she is coming to us because of her decompensated cirrhosis and evaluation for liver transplantation. For her hepatitis C, she was treated with Mayvret 8 weeks and she cleared the virus, achieving sustained virological response.  She also abstained from alcoholic beverages on 10/31/2021.      Previously, she had jaundice and abdominal distention.  She did have 1 paracentesis, which was done on 03/15/2022.  She has also issues with hepatic encephalopathy and has been on lactulose, which she is no longer taking and now she is only on rifaximin.     For today she has minimal edema, no abdominal distention.  She denies any significant confusion.  She claims to be slow in mentation but not lethargic, not confused.  She has also since I last saw her no gastrointestinal bleeding including melena, hematemesis or hematochezia.  She denies any shortness of breath.  Has some dyspnea on exertion, less than 2 blocks but no chest pain.  She is not jaundiced today and she is moving her bowels at least once a day with no blood in it.  She also did not have any infections and had her vaccination for COVID with the Pfizer brand and has done so far 4 doses.    Medical hx Surgical hx   Past Medical History:   Diagnosis Date     Chronic hepatitis C (H)      History of blood transfusion       History of pneumonectomy       No past surgical history on file.       Medications  Prior to Admission medications    Medication Sig Start Date End Date Taking? Authorizing Provider   docusate sodium (COLACE) 100 MG capsule Take 100 mg by mouth 2 times daily 4/4/22  Yes Reported, Patient   folic acid (FOLVITE) 1 MG tablet  7/6/22  Yes Reported, Patient   magnesium oxide (MAG-OX) 400 MG tablet Take 400 mg by mouth 2 times daily 4/4/22  Yes Reported, Patient   rifaximin (XIFAXAN) 550 MG TABS tablet Take 550 mg by mouth 4/4/22  Yes Reported, Patient   calcium carbonate-vitamin D (OSCAL W/D) 500-200 MG-UNIT tablet Take 1 tablet by mouth 2 times daily (with meals) Take one tab once in AM and once in PM with meals  Patient not taking: Reported on 9/7/2022 7/22/22   Meng Quezada MD   furosemide (LASIX) 40 MG tablet Take 1 tablet (40 mg) by mouth 2 times daily  Patient taking differently: Take 40 mg by mouth daily 20 mg during day. 3/28/22 7/21/22  Radha Nina MD   spironolactone (ALDACTONE) 100 MG tablet Take 1 tablet (100 mg) by mouth daily 3/29/22 7/21/22  Radha Nina MD   traZODone (DESYREL) 50 MG tablet Take 1 tablet (50 mg) by mouth nightly as needed for sleep 3/28/22 7/21/22  Radha Nina MD   vitamin D2 (ERGOCALCIFEROL) 41908 units (1250 mcg) capsule Take 1 capsule (50,000 Units) by mouth once a week for 12 doses  Patient not taking: No sig reported 7/22/22 10/8/22  Meng Quezada MD   vitamin D3 (CHOLECALCIFEROL) 50 mcg (2000 units) tablet Take 1 tablet (50 mcg) by mouth daily Take one tablet daily  Patient not taking: No sig reported 10/14/22   Meng Quezada MD       Allergies  No Known Allergies    Review of systems  A 10-point review of systems was negative    Examination  /65   Pulse 62   Temp 98.2  F (36.8  C)   Wt 91.4 kg (201 lb 6.4 oz)   SpO2 100%   BMI 33.26 kg/m    Body mass index is 33.26 kg/m .    HENRY Maldonado,  A+Ox3, normal color  Lym- no palpable LAD  CVS- RRR  RS- CTA  Abd- Obese. No shifting dullness.  Extr- hands normal, no JAVIER  Skin- no rash or jaundice  Neuro- no asterixis  Psych- normal mood    Laboratory  Lab Results   Component Value Date     09/07/2022    POTASSIUM 3.7 09/07/2022    POTASSIUM 4.0 07/21/2022    CHLORIDE 102 09/07/2022    CHLORIDE 103 07/21/2022    CO2 23 09/07/2022    CO2 26 07/21/2022    BUN 14.0 09/07/2022    BUN 19 07/21/2022    CR 0.90 09/07/2022       Lab Results   Component Value Date    BILITOTAL 3.5 09/07/2022    ALT 27 09/07/2022    AST 47 09/07/2022    ALKPHOS 132 09/07/2022       Lab Results   Component Value Date    ALBUMIN 2.9 09/07/2022    ALBUMIN 2.6 07/21/2022    PROTTOTAL 7.6 09/07/2022        Lab Results   Component Value Date    WBC 4.9 09/07/2022    HGB 10.1 09/07/2022    MCV 99 09/07/2022     09/07/2022       Lab Results   Component Value Date    INR 1.58 09/07/2022    INR 1.6 04/12/2022     MELD-Na score: 18 at 9/7/2022  8:05 AM  MELD score: 16 at 9/7/2022  8:05 AM  Calculated from:  Serum Creatinine: 0.90 mg/dL (Using min of 1 mg/dL) at 9/7/2022  8:05 AM  Serum Sodium: 134 mmol/L at 9/7/2022  8:05 AM  Total Bilirubin: 3.5 mg/dL at 9/7/2022  8:05 AM  INR(ratio): 1.58 at 9/7/2022  8:05 AM  Age: 61 years  Radiology    ASSESSMENT AND PLAN:  Cirrhosis of the liver.  Ms. Slater has decompensated cirrhosis of the liver which is thought to be a combination of hepatitis C and alcohol.  She had previously told us, as far as the hep C is concerned she thought that she may have got from blood transfusion in the 1980s.  Anyway, that hepatitis C was treated with Mavyret for 8 weeks and in fact, she has achieved a sustained virological response.      As far as her decompensated cirrhosis is concerned, historically, she had only 1 paracentesis performed in the past.  Her MELD score on her labs today is 15 and her MELD-sodium is 18.  She has been cleared by Cardiology.  She  otherwise appears to be very stable.  We did explain to Ms. Slater that she needs to finish the evaluation.  Today, she is also going to meet with our  and she is close to finishing her chemical dependency treatment.  She was seen by our surgeons and except for her continued tobacco use, they were not concerned about it.  We reiterated the importance of tobacco cessation which she is open to and she was followed by her sister.  She claims that she is now occasionally smoking only like maybe every other day, 1-2 pieces, but she will need to completely abstain from it.     Ms. Slater will need also in the meantime to continue doing surveillance for HCC with imaging.  She had endoscopy on 01/26/2021 which she did not have esophageal varices.  This needs to be repeated and we will order those.  As far as her hep C is concerned, this was treated and she has a sustained virological response.  No further workup needed.  Ms. Slater, for all her other medical issues and healthcare maintenance, will follow up with her primary care physician.    I spent, on this encounter of 09/07/2022, 40 minutes in chart reviewing, history taking, physical examination and documentation.  Some of the time was also used for coordination of care and counseling.      Meng Quezada MD  Hepatology  [facility]

## 2022-09-07 NOTE — NURSING NOTE
Chief Complaint   Patient presents with     RECHECK     Return visit, no new concerns.     Blood pressure 110/65, pulse 62, temperature 98.2  F (36.8  C), weight 91.4 kg (201 lb 6.4 oz), SpO2 100 %.    JUAN JOSEPH

## 2022-09-07 NOTE — PROGRESS NOTES
[facility] Hepatology    Follow-up Visit    CHIEF COMPLAINT AND REASON FOR THE VISIT:  Hepatitis C and alcohol related cirrhosis.    CONSULTING HEALTHCARE PROVIDER:  Sha Vargas MD, Oncology/Hematology [facility].    SUBJECTIVE:  Ms. Marj Slater is a 61-year-old female with history of alcohol abuse and related chronic liver disease.  She also has a history of hepatitis C and she is coming to us because of her decompensated cirrhosis and evaluation for liver transplantation. For her hepatitis C, she was treated with Mayvret 8 weeks and she cleared the virus, achieving sustained virological response.  She also abstained from alcoholic beverages on 10/31/2021.      Previously, she had jaundice and abdominal distention.  She did have 1 paracentesis, which was done on 03/15/2022.  She has also issues with hepatic encephalopathy and has been on lactulose, which she is no longer taking and now she is only on rifaximin.     For today she has minimal edema, no abdominal distention.  She denies any significant confusion.  She claims to be slow in mentation but not lethargic, not confused.  She has also since I last saw her no gastrointestinal bleeding including melena, hematemesis or hematochezia.  She denies any shortness of breath.  Has some dyspnea on exertion, less than 2 blocks but no chest pain.  She is not jaundiced today and she is moving her bowels at least once a day with no blood in it.  She also did not have any infections and had her vaccination for COVID with the Pfizer brand and has done so far 4 doses.    Medical hx Surgical hx   Past Medical History:   Diagnosis Date     Chronic hepatitis C (H)      History of blood transfusion      History of pneumonectomy       No past surgical history on file.       Medications  Prior to Admission medications    Medication Sig Start Date End Date Taking? Authorizing Provider   docusate sodium (COLACE) 100 MG capsule Take 100 mg by mouth 2 times  daily 4/4/22  Yes Reported, Patient   folic acid (FOLVITE) 1 MG tablet  7/6/22  Yes Reported, Patient   magnesium oxide (MAG-OX) 400 MG tablet Take 400 mg by mouth 2 times daily 4/4/22  Yes Reported, Patient   rifaximin (XIFAXAN) 550 MG TABS tablet Take 550 mg by mouth 4/4/22  Yes Reported, Patient   calcium carbonate-vitamin D (OSCAL W/D) 500-200 MG-UNIT tablet Take 1 tablet by mouth 2 times daily (with meals) Take one tab once in AM and once in PM with meals  Patient not taking: Reported on 9/7/2022 7/22/22   Meng Quezada MD   furosemide (LASIX) 40 MG tablet Take 1 tablet (40 mg) by mouth 2 times daily  Patient taking differently: Take 40 mg by mouth daily 20 mg during day. 3/28/22 7/21/22  Radha Nina MD   spironolactone (ALDACTONE) 100 MG tablet Take 1 tablet (100 mg) by mouth daily 3/29/22 7/21/22  Radha Nina MD   traZODone (DESYREL) 50 MG tablet Take 1 tablet (50 mg) by mouth nightly as needed for sleep 3/28/22 7/21/22  Radha Nina MD   vitamin D2 (ERGOCALCIFEROL) 23685 units (1250 mcg) capsule Take 1 capsule (50,000 Units) by mouth once a week for 12 doses  Patient not taking: No sig reported 7/22/22 10/8/22  Meng Quezada MD   vitamin D3 (CHOLECALCIFEROL) 50 mcg (2000 units) tablet Take 1 tablet (50 mcg) by mouth daily Take one tablet daily  Patient not taking: No sig reported 10/14/22   Meng Quezada MD       Allergies  No Known Allergies    Review of systems  A 10-point review of systems was negative    Examination  /65   Pulse 62   Temp 98.2  F (36.8  C)   Wt 91.4 kg (201 lb 6.4 oz)   SpO2 100%   BMI 33.26 kg/m    Body mass index is 33.26 kg/m .    Gen- well, NAD, A+Ox3, normal color  Lym- no palpable LAD  CVS- RRR  RS- CTA  Abd- Obese. No shifting dullness.  Extr- hands normal, no JAVIER  Skin- no rash or jaundice  Neuro- no asterixis  Psych- normal mood    Laboratory  Lab Results   Component Value Date     09/07/2022     POTASSIUM 3.7 09/07/2022    POTASSIUM 4.0 07/21/2022    CHLORIDE 102 09/07/2022    CHLORIDE 103 07/21/2022    CO2 23 09/07/2022    CO2 26 07/21/2022    BUN 14.0 09/07/2022    BUN 19 07/21/2022    CR 0.90 09/07/2022       Lab Results   Component Value Date    BILITOTAL 3.5 09/07/2022    ALT 27 09/07/2022    AST 47 09/07/2022    ALKPHOS 132 09/07/2022       Lab Results   Component Value Date    ALBUMIN 2.9 09/07/2022    ALBUMIN 2.6 07/21/2022    PROTTOTAL 7.6 09/07/2022        Lab Results   Component Value Date    WBC 4.9 09/07/2022    HGB 10.1 09/07/2022    MCV 99 09/07/2022     09/07/2022       Lab Results   Component Value Date    INR 1.58 09/07/2022    INR 1.6 04/12/2022     MELD-Na score: 18 at 9/7/2022  8:05 AM  MELD score: 16 at 9/7/2022  8:05 AM  Calculated from:  Serum Creatinine: 0.90 mg/dL (Using min of 1 mg/dL) at 9/7/2022  8:05 AM  Serum Sodium: 134 mmol/L at 9/7/2022  8:05 AM  Total Bilirubin: 3.5 mg/dL at 9/7/2022  8:05 AM  INR(ratio): 1.58 at 9/7/2022  8:05 AM  Age: 61 years  Radiology    ASSESSMENT AND PLAN:  Cirrhosis of the liver.  Ms. Slater has decompensated cirrhosis of the liver which is thought to be a combination of hepatitis C and alcohol.  She had previously told us, as far as the hep C is concerned she thought that she may have got from blood transfusion in the 1980s.  Anyway, that hepatitis C was treated with Mavyret for 8 weeks and in fact, she has achieved a sustained virological response.      As far as her decompensated cirrhosis is concerned, historically, she had only 1 paracentesis performed in the past.  Her MELD score on her labs today is 15 and her MELD-sodium is 18.  She has been cleared by Cardiology.  She otherwise appears to be very stable.  We did explain to Ms. Slater that she needs to finish the evaluation.  Today, she is also going to meet with our  and she is close to finishing her chemical dependency treatment.  She was seen by our surgeons and except  for her continued tobacco use, they were not concerned about it.  We reiterated the importance of tobacco cessation which she is open to and she was followed by her sister.  She claims that she is now occasionally smoking only like maybe every other day, 1-2 pieces, but she will need to completely abstain from it.     Ms. Slater will need also in the meantime to continue doing surveillance for HCC with imaging.  She had endoscopy on 01/26/2021 which she did not have esophageal varices.  This needs to be repeated and we will order those.  As far as her hep C is concerned, this was treated and she has a sustained virological response.  No further workup needed.  Ms. Slater, for all her other medical issues and healthcare maintenance, will follow up with her primary care physician.    I spent, on this encounter of 09/07/2022, 40 minutes in chart reviewing, history taking, physical examination and documentation.  Some of the time was also used for coordination of care and counseling.      Meng Quezada MD  Hepatology  [facility]

## 2022-09-08 NOTE — PROGRESS NOTES
Transplant Social Work Services Progress Note      Date of Initial Social Work Evaluation: 22  Collaborated with: Liver Transplant Team    Data: Marj is being evaluated for a liver transplant.  Intervention: I met with Marj and her sister Ashley in clinic for psychosocial follow up.  We discussed the followin) Chemical dependency treatment: Marj reports she will complete her outpatient program with [facility] next week.  I have advised her to request her counselor fax me a copy of her discharge summary.  Mraj has reduced her smoking but still needs to work towards quitting completely.  She understands our team's recommendation to quit.   2) Health Care Directive: Marj had mailed me her directive but it was invalid because she did not sign the document.  I provided her with a new directive and advised her to mail me a copy once it is complete.  3) Financial: Marj is receiving SNAP food benefits and she has applied for General Assistance through her county of residence.  She also applied for SSDI benefits a couple of months ago.  I provided education about Medicare eligibility by age and disability.  4) Mental Health: Marj's sister feels Marj would benefit from participating in our support group.  Marj declines any need for individual therapy or medication evaluation.  Assessment: PEth today is pending. Two other PEth tests are 10 or less from March and July of this year.  Marj is close to completing her outpatient chemical dependency treatment program.  Education provided by SW: Virtual liver transplant support group (P[facility] crowder)  Plan: Awaiting verification of Shannons outpatient chemical dependency treatment completion.  She would be an acceptable candidate for liver transplantation if her most recent PEth is negative and when she satisfactorily completes outpatient treatment.          NIRAV Caldera, [facility]  Liver Transplant   Phone  [phone]  Pager [phone]

## 2022-09-09 LAB
PLPETH BLD-MCNC: <10 NG/ML
POPETH BLD-MCNC: <10 NG/ML

## 2022-09-15 ENCOUNTER — DOCUMENTATION ONLY (OUTPATIENT)
Dept: TRANSPLANT | Facility: CLINIC | Age: 62
End: 2022-09-15

## 2022-09-15 DIAGNOSIS — K76.6 PORTAL HYPERTENSION (H): ICD-10-CM

## 2022-09-15 DIAGNOSIS — K70.31 ALCOHOLIC CIRRHOSIS OF LIVER WITH ASCITES (H): ICD-10-CM

## 2022-09-15 NOTE — PROGRESS NOTES
I received a fax from [facility] containing patient's discharge summary from chemical dependency treatment.  She completed a total of 33 hours, individual and group treatment.  Dates of service are 711/22-9/13/22.    JAMIA Service Discharge Summary Recommendations:  Continue to care for physical health conditions by regularly connecting with your medical provider.  Attend a minimum of one weekly sober support group meeting(s) once per week.    PEth test on 9/7/22 was negative.        NIRAV Caldera, [facility]  Liver Transplant   Phone [phone]  Pager [phone]

## 2022-09-25 ENCOUNTER — HEALTH MAINTENANCE LETTER (OUTPATIENT)
Age: 62
End: 2022-09-25

## 2022-09-28 ENCOUNTER — PRE VISIT (OUTPATIENT)
Dept: PULMONOLOGY | Facility: CLINIC | Age: 62
End: 2022-09-28

## 2022-09-28 ENCOUNTER — OFFICE VISIT (OUTPATIENT)
Dept: PULMONOLOGY | Facility: CLINIC | Age: 62
End: 2022-09-28
Attending: INTERNAL MEDICINE
Payer: COMMERCIAL

## 2022-09-28 VITALS
SYSTOLIC BLOOD PRESSURE: 129 MMHG | HEART RATE: 61 BPM | DIASTOLIC BLOOD PRESSURE: 72 MMHG | OXYGEN SATURATION: 99 % | WEIGHT: 201 LBS | BODY MASS INDEX: 33.19 KG/M2

## 2022-09-28 DIAGNOSIS — K76.6 PORTAL HYPERTENSION (H): ICD-10-CM

## 2022-09-28 DIAGNOSIS — Z72.0 CURRENT TOBACCO USE: Primary | ICD-10-CM

## 2022-09-28 PROCEDURE — G0463 HOSPITAL OUTPT CLINIC VISIT: HCPCS

## 2022-09-28 PROCEDURE — 99204 OFFICE O/P NEW MOD 45 MIN: CPT | Mod: GC | Performed by: INTERNAL MEDICINE

## 2022-09-28 ASSESSMENT — PAIN SCALES - GENERAL: PAINLEVEL: NO PAIN (0)
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
## 2022-09-28 NOTE — NURSING NOTE
Chief Complaint   Patient presents with     New Patient     New cirrhosis      Vitals were taken and medications were reconciled.     Zena Morales RMA  12:51 PM
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
## 2022-09-29 ENCOUNTER — DOCUMENTATION ONLY (OUTPATIENT)
Dept: TRANSPLANT | Facility: CLINIC | Age: 62
End: 2022-09-29

## 2022-09-29 ENCOUNTER — DOCUMENTATION ONLY (OUTPATIENT)
Dept: OTHER | Facility: CLINIC | Age: 62
End: 2022-09-29

## 2022-09-29 NOTE — PROGRESS NOTES
Health care directive received via email from patient.  I forwarded her directive to our [facility] department.        NIRAV Caldera, [facility]  Liver Transplant   Phone [phone]  Pager [phone]

## 2022-10-11 ENCOUNTER — COMMITTEE REVIEW (OUTPATIENT)
Dept: TRANSPLANT | Facility: CLINIC | Age: 62
End: 2022-10-11

## 2022-10-11 DIAGNOSIS — K74.60 CIRRHOSIS OF LIVER (H): Primary | ICD-10-CM

## 2022-10-11 DIAGNOSIS — R53.81 PHYSICAL DECONDITIONING: ICD-10-CM

## 2022-10-11 DIAGNOSIS — M62.81 GENERALIZED MUSCLE WEAKNESS: Primary | ICD-10-CM

## 2022-10-11 NOTE — COMMITTEE REVIEW
Abdominal Committee Review Note     Evaluation Date: 7/20/2022  Committee Review Date: 10/11/2022    Organ being evaluated for: Liver    Transplant Phase: Evaluation  Transplant Status: Active    Transplant Coordinator: Lewis Carrizales Jr.  Transplant Surgeon:   Jaime Olson    Referring Physician: Sha Vargas    Primary Diagnosis: Alcoholic Cirrhosis with Hepatitis C  Secondary Diagnosis:     Committee Review Members:  Licensed Mental Health Chase Urbina, [facility]   Nutrition Virginia Goins, TANI    - Clinical Morales Cesar, MSW, Sue Hale, [facility]   Transplant Sophia Porras, RN, Bonnie Mendez LPN, Milagros Maria, RN, Alyssa Herrera, ADRYAN, Opal Del Rosario, ADRYAN, Karen Daniels, ADRYAN, Jr Lewis Carrizales, ADRYAN, Xuan Lee, RN, Martine Ridley, APRN CNP, Heriberto Salcedo, ADRYAN   Transplant Hepatology  Yoli Smith MD, Stephanie Acuna MD, Michelle Perdue MD, Meng Quezada MD, Shaan Jeter MD, [facility] Amanda Juarez MD, Kobi Angelo MD   Transplant Surgery Kristopher Forbes MD, Vianca Damon MD, Lincoln Garcia MD       Transplant Eligibility: ETOH, Cirrhosis with MELD    Committee Review Decision: Approved    Relative Contraindications: None    Absolute Contraindications: None    Committee Chair Stephanie Acuna MD verbally attested to the committee's decision.    Committee Discussion Details:     Approved for listing     Approved for LRD

## 2022-10-24 ENCOUNTER — TELEPHONE (OUTPATIENT)
Dept: TRANSPLANT | Facility: CLINIC | Age: 62
End: 2022-10-24

## 2022-10-24 NOTE — TELEPHONE ENCOUNTER
Liver Transplant Evaluation/Teaching    TEACHING TOPICS: Evaluation Process, Evaluation Items, Diagnostic Studies, Consultation, Chemical Dependency Policy, CD Eval, Donor Source, Liver Allocation, MELD System, UNOS, Waiting List, Follow up while on transplant list, Follow up after transplantation, Infection and Rejection, Immunosuppression , Medication Teaching, Lab Recording after transplant, Laboratory Frequency after transplant , Consent for evaluation and One year survival rates  INSTRUCTIONAL MATERIAL USED/GIVEN: Liver Transplant Handbook, MELD Booklet, Donor Booklet, Web Sites Options, Verbal instructions, Multiple Listing Brochure , Consent for evaluation signed, One year survival rates and SRTR (Scientific Registry) Data  Person(s) involved in teaching: patient  Asks Questions: YES  Eager to Learn: YES  Cooperative: YES  Receptive (willing/able to accept information): YES  Reason for the appointment, diagnosis and treatment plan:YES  Knowledge of proper use of medications and conditions for which they are ordered (with special attention to potential side effects or drug interactions): YES  Which situations necessitate calling provider and whom to contact:YES  Other:   Teaching Concerns Addressed   Comments: Big 5: MD compliance, med compliance, lab compliance, eating right & exercise, no alcohol or non-prescribed meds/drugs before or after transplant.   Proper use and care of (medical equip, care aids, etc.): n/a  Nutritional needs and diet plan: YES  Pain management techniques: n/a  Wound Care: n/a  How and/when to access community resources: YES  Patient is aware of and agrees to required commitment to post-op care and long term follow-up: YES  Patient has name and phone number of transplant coordinator.   Time Spent face-to-face teachin minutes.  Lewis Carrizales Jr., BSN, RN  Liver Transplant Coordinator  [phone]

## 2022-10-25 ENCOUNTER — TELEPHONE (OUTPATIENT)
Dept: GASTROENTEROLOGY | Facility: CLINIC | Age: 62
End: 2022-10-25

## 2022-10-25 NOTE — TELEPHONE ENCOUNTER
Screening Questions  BLUE  KIND OF PREP RED  LOCATION [review exclusion criteria] GREEN  SEDATION TYPE        Y Are you active on mychart?       KAMAR LITTLE Ordering/Referring Provider?        HP MA What type of coverage do you have?      NO Have you had a positive covid test in the last 90 days?     34.3 1. BMI  [BMI 40+ - review exclusion criteria]    Y  2. Are you able to give consent for your medical care? [IF NO,RN REVIEW]        N  3. Are you taking any prescription pain medications on a routine schedule?        3a. EXTENDED PREP What kind of prescription?     N 4. Do you have any chemical dependencies such as alcohol, street drugs, or methadone?    N 5. Do you have any history of post-traumatic stress syndrome, severe anxiety or history of psychosis?      **If yes 3- 5 , please schedule with MAC sedation.**          IF YES TO ANY 6 - 10 - HOSPITAL SETTING ONLY.     N 6.   Do you need assistance transferring?     N 7.   Have you had a heart or lung transplant?    N 8.   Are you currently on dialysis?   N 9.   Do you use daily home oxygen?   N 10. Do you take nitroglycerin?   10a.  If yes, how often?     11. [FEMALES]  N Are you currently pregnant?    11a.  If yes, how many weeks? [ Greater than 12 weeks, OR NEEDED]    N 12. Do you have Pulmonary Hypertension? *NEED PAC APPT AT [facility]*     N 13. [review exclusion criteria]  Do you have any implantable devices in your body (pacemaker, defib, LVAD)?    N 14. In the past 6 months, have you had any heart related issues including cardiomyopathy or heart attack?     14a.  If yes, did it require cardiac stenting if so when?     N 15. Have you had a stroke or Transient ischemic attack (TIA - aka  mini stroke ) within 6 months?      N 16. Do you have mod to severe Obstructive Sleep Apnea?  [Hospital only - Ok at [facility]]    N 17. Do you have SEVERE AND UNCONTROLLED asthma? *NEED PAC APPT AT [facility]*     N 18. Are you currently taking any blood thinners?     18a.  "If yes, inform patient to \"follow up w/ ordering provider for bridging instructions.\"    N 19. Do you take the medication Phentermine?    19a. If yes, \"Hold for 7 days before procedure.  Please consult your prescribing provider if you have questions about holding this medication.\"     N  20. Do you have chronic kidney disease?      N  21. Do you have a diagnosis of diabetes?     N  22. On a regular basis do you go 3-5 days between bowel movements?      23. Preferred LOCAL Pharmacy for Pre Prescription    [ LIST ONLY ONE PHARMACY]        [facility] #[id number] - [facility] [address] AT [facility] [address]        - CLOSING REMINDERS -    Informed patient they will need an adult    Cannot take any type of public or medical transportation alone    Conscious Sedation- Needs  for 6 hours after the procedure       MAC/General-Needs  for 24 hours after procedure    Pre-Procedure Covid test to be completed [[facility] PCR Testing Required]    Confirmed Nurse will call to complete assessment       - SCHEDULING DETAILS -     PARADISE  Surgeon    1/5/2023  Date of Procedure  Lower Endoscopy [Colonoscopy]  Type of Procedure Scheduled   [facility] Location  [facility] PREP-If you answer yes to questions #8, #20, #21Which Colonoscopy Prep was Sent?     MAC Sedation Type     NO PAC / Pre-op Required         Additional comments:            "

## 2022-12-21 ENCOUNTER — TELEPHONE (OUTPATIENT)
Dept: GASTROENTEROLOGY | Facility: CLINIC | Age: 62
End: 2022-12-21

## 2022-12-21 DIAGNOSIS — K70.30 ALCOHOLIC CIRRHOSIS, UNSPECIFIED WHETHER ASCITES PRESENT (H): Primary | ICD-10-CM

## 2022-12-21 RX ORDER — BISACODYL 5 MG
TABLET, DELAYED RELEASE (ENTERIC COATED) ORAL
Qty: 4 TABLET | Refills: 0 | Status: SHIPPED | OUTPATIENT
Start: 2022-12-21 | End: 2023-01-05

## 2022-12-21 NOTE — TELEPHONE ENCOUNTER
Attempted to contact patient regarding upcoming colonoscopy  procedure on 1/5/23 for pre assessment questions. No answer.     Left message to return call to [phone] #4    Discuss Covid policy.     Pre op exam scheduled: N/A    Arrival time: 0645      [facility] [address] [place] [address], 5[address] Floor, [place] [address]    Sedation type: MAC    Anticoagulants: No    Electronic implanted devices? No    Diabetic? No    Indication for procedure: Cirrhosis of liver     Bowel prep recommendation: Standard Golytely     Prep instructions sent via Lynk. Bowel prep script sent to     [facility] #[id number] - [place], [place] - [address] AT [facility] [address]      JESS LEUNG RN

## 2022-12-21 NOTE — TELEPHONE ENCOUNTER
Pre assessment questions completed for upcoming colonoscopy  procedure scheduled on 1.5.23    COVID policy reviewed.     Reviewed procedural arrival time 0645 and facility location [facility]; [address] [place] [address], 5[address] Floor, [place] [address]    Designated  policy reviewed. Instructed to have someone stay 24 hours post procedure.     Anticoagulation/blood thinners? No    Electronic implanted devices? No    Diabetic? No    Reviewed standard golytely procedure prep instructions.     Patient verbalized understanding and had no questions or concerns at this time.    Sommer Rivers RN

## 2022-12-27 ENCOUNTER — OFFICE VISIT (OUTPATIENT)
Dept: GASTROENTEROLOGY | Facility: CLINIC | Age: 62
End: 2022-12-27
Attending: INTERNAL MEDICINE
Payer: COMMERCIAL

## 2022-12-27 ENCOUNTER — LAB (OUTPATIENT)
Dept: LAB | Facility: CLINIC | Age: 62
End: 2022-12-27
Attending: INTERNAL MEDICINE
Payer: COMMERCIAL

## 2022-12-27 ENCOUNTER — ALLIED HEALTH/NURSE VISIT (OUTPATIENT)
Dept: TRANSPLANT | Facility: CLINIC | Age: 62
End: 2022-12-27
Attending: INTERNAL MEDICINE
Payer: COMMERCIAL

## 2022-12-27 VITALS
TEMPERATURE: 98.5 F | HEART RATE: 64 BPM | WEIGHT: 208.8 LBS | BODY MASS INDEX: 34.48 KG/M2 | SYSTOLIC BLOOD PRESSURE: 118 MMHG | DIASTOLIC BLOOD PRESSURE: 56 MMHG | OXYGEN SATURATION: 99 %

## 2022-12-27 DIAGNOSIS — K76.6 PORTAL HYPERTENSION (H): ICD-10-CM

## 2022-12-27 DIAGNOSIS — K70.31 ALCOHOLIC CIRRHOSIS OF LIVER WITH ASCITES (H): ICD-10-CM

## 2022-12-27 LAB
ALBUMIN SERPL BCG-MCNC: 3.2 G/DL (ref 3.5–5.2)
ALP SERPL-CCNC: 141 U/L (ref 35–104)
ALT SERPL W P-5'-P-CCNC: 31 U/L (ref 10–35)
ANION GAP SERPL CALCULATED.3IONS-SCNC: 6 MMOL/L (ref 7–15)
AST SERPL W P-5'-P-CCNC: 48 U/L (ref 10–35)
BILIRUB DIRECT SERPL-MCNC: 0.9 MG/DL (ref 0–0.3)
BILIRUB SERPL-MCNC: 2.6 MG/DL
BUN SERPL-MCNC: 16.5 MG/DL (ref 8–23)
CALCIUM SERPL-MCNC: 9.5 MG/DL (ref 8.8–10.2)
CHLORIDE SERPL-SCNC: 104 MMOL/L (ref 98–107)
CREAT SERPL-MCNC: 0.96 MG/DL (ref 0.51–0.95)
DEPRECATED CALCIDIOL+CALCIFEROL SERPL-MC: 7 UG/L (ref 20–75)
DEPRECATED HCO3 PLAS-SCNC: 25 MMOL/L (ref 22–29)
ERYTHROCYTE [DISTWIDTH] IN BLOOD BY AUTOMATED COUNT: 16 % (ref 10–15)
FERRITIN SERPL-MCNC: 137 NG/ML (ref 11–328)
GFR SERPL CREATININE-BSD FRML MDRD: 66 ML/MIN/1.73M2
GLUCOSE SERPL-MCNC: 151 MG/DL (ref 70–99)
HCT VFR BLD AUTO: 34 % (ref 35–47)
HGB BLD-MCNC: 11.2 G/DL (ref 11.7–15.7)
INR PPP: 1.51 (ref 0.85–1.15)
IRON BINDING CAPACITY (ROCHE): 280 UG/DL (ref 240–430)
IRON SATN MFR SERPL: 67 % (ref 15–46)
IRON SERPL-MCNC: 188 UG/DL (ref 37–145)
MCH RBC QN AUTO: 32.9 PG (ref 26.5–33)
MCHC RBC AUTO-ENTMCNC: 32.9 G/DL (ref 31.5–36.5)
MCV RBC AUTO: 100 FL (ref 78–100)
PLATELET # BLD AUTO: 106 10E3/UL (ref 150–450)
POTASSIUM SERPL-SCNC: 4.2 MMOL/L (ref 3.4–5.3)
PROT SERPL-MCNC: 7.9 G/DL (ref 6.4–8.3)
RBC # BLD AUTO: 3.4 10E6/UL (ref 3.8–5.2)
SODIUM SERPL-SCNC: 135 MMOL/L (ref 136–145)
TRANSFERRIN SERPL-MCNC: 235 MG/DL (ref 200–360)
WBC # BLD AUTO: 4.1 10E3/UL (ref 4–11)

## 2022-12-27 PROCEDURE — 82306 VITAMIN D 25 HYDROXY: CPT | Mod: 90 | Performed by: PATHOLOGY

## 2022-12-27 PROCEDURE — 84466 ASSAY OF TRANSFERRIN: CPT | Mod: 90 | Performed by: PATHOLOGY

## 2022-12-27 PROCEDURE — 99000 SPECIMEN HANDLING OFFICE-LAB: CPT | Performed by: PATHOLOGY

## 2022-12-27 PROCEDURE — 85027 COMPLETE CBC AUTOMATED: CPT | Performed by: PATHOLOGY

## 2022-12-27 PROCEDURE — 99214 OFFICE O/P EST MOD 30 MIN: CPT | Performed by: INTERNAL MEDICINE

## 2022-12-27 PROCEDURE — 82728 ASSAY OF FERRITIN: CPT | Mod: 90 | Performed by: PATHOLOGY

## 2022-12-27 PROCEDURE — G0463 HOSPITAL OUTPT CLINIC VISIT: HCPCS | Performed by: INTERNAL MEDICINE

## 2022-12-27 PROCEDURE — 83540 ASSAY OF IRON: CPT | Performed by: PATHOLOGY

## 2022-12-27 PROCEDURE — 80321 ALCOHOLS BIOMARKERS 1OR 2: CPT | Mod: 90 | Performed by: PATHOLOGY

## 2022-12-27 PROCEDURE — 80053 COMPREHEN METABOLIC PANEL: CPT | Performed by: PATHOLOGY

## 2022-12-27 PROCEDURE — 82248 BILIRUBIN DIRECT: CPT | Performed by: PATHOLOGY

## 2022-12-27 PROCEDURE — 36415 COLL VENOUS BLD VENIPUNCTURE: CPT | Performed by: PATHOLOGY

## 2022-12-27 PROCEDURE — 85610 PROTHROMBIN TIME: CPT | Performed by: PATHOLOGY

## 2022-12-27 ASSESSMENT — PAIN SCALES - GENERAL: PAINLEVEL: NO PAIN (0)

## 2022-12-27 NOTE — LETTER
12/27/2022         RE: Marj Slater  [address]  [place] [address]        Dear Colleague,    Thank you for referring your patient, Marj Slater, to the [facility] HEPATOLOGY [facility] [place]. Please see a copy of my visit note below.    [facility] Hepatology    Follow-up Visit    CHIEF COMPLAINT AND REASON FOR THE VISIT:  Hepatitis C and alcohol-related cirrhosis.    SUBJECTIVE:  Mrs. Marj Slater is a 62-year-old female with alcohol abuse and related chronic liver disease.  She has also a history of hepatitis C and she came to us with decompensated cirrhosis and evaluation also for liver transplantation.     Mrs. Slater for her hepatitis C was treated with Mavyret 8 weeks and she cleared the virus and achieved a sustained virological response.  She also abstained from alcoholic beverages on 10/31/2021 and improved since.  Historically, she had jaundice, abdominal distention and edema.  She did have 1 paracentesis done on 03/15/2022.  She also had problems with hepatic encephalopathy and has been on lactulose, which she has discontinued and currently is on rifaximin.    Today, she is relatively doing quite well, does not have any significant edema or abdominal distention.  She has no signs of encephalopathy, including lethargy or confusion.  She has also no gastrointestinal bleeding like melena, hematemesis or hematochezia.  Today, she states that she occasionally has some nausea, but no significant abdominal pain.  She is moving her bowels at least once a day with no blood in it and her weight has remained stable.  Her MELD score is 15.    Medical hx Surgical hx   Past Medical History:   Diagnosis Date     Chronic hepatitis C (H)      History of blood transfusion      History of pneumonectomy       No past surgical history on file.       Medications  Prior to Admission medications    Medication Sig Start Date End Date Taking? Authorizing Provider   bisacodyl (DULCOLAX) 5 MG EC tablet  Take 2 tablets at 3 pm the day before your procedure. If your procedure is before 11 am, take 2 additional tablets at 11 pm. If your procedure is after 11 am, take 2 additional tablets at 6 am. For additional instructions refer to your colonoscopy prep instructions. 12/21/22  Yes Michelle Perdue MD   docusate sodium (COLACE) 100 MG capsule Take 100 mg by mouth 2 times daily 4/4/22  Yes Reported, Patient   folic acid (FOLVITE) 1 MG tablet  7/6/22  Yes Reported, Patient   magnesium oxide (MAG-OX) 400 MG tablet Take 400 mg by mouth 2 times daily 4/4/22  Yes Reported, Patient   rifaximin (XIFAXAN) 550 MG TABS tablet Take 550 mg by mouth 4/4/22  Yes Reported, Patient   calcium carbonate-vitamin D (OSCAL W/D) 500-200 MG-UNIT tablet Take 1 tablet by mouth 2 times daily (with meals) Take one tab once in AM and once in PM with meals  Patient not taking: Reported on 9/7/2022 7/22/22   Meng Quezada MD   furosemide (LASIX) 40 MG tablet Take 1 tablet (40 mg) by mouth 2 times daily  Patient taking differently: Take 40 mg by mouth daily 20 mg during day. 3/28/22 7/21/22  Radha Nnia MD   nicotine (NICORETTE) 2 MG gum Place 1 each (2 mg) inside cheek every hour as needed for smoking cessation  Patient not taking: Reported on 12/27/2022 9/28/22   Trent Rush MD   polyethylene glycol (GOLYTELY) 236 g suspension The night before the exam at 6 pm drink an 8-ounce glass every 15 minutes until the jug is half empty. If you arrive before 11 AM: Drink the other half of the Golytely jug at 11 PM night before procedure. If you arrive after 11 AM: Drink the other half of the Golytely jug at 6 AM day of procedure. For additional instructions refer to your colonoscopy prep instructions.  Patient not taking: Reported on 12/27/2022 12/21/22   Michelle Perdue MD   spironolactone (ALDACTONE) 100 MG tablet Take 1 tablet (100 mg) by mouth daily 3/29/22 7/21/22  Radha Nina MD   traZODone (DESYREL) 50 MG tablet Take  1 tablet (50 mg) by mouth nightly as needed for sleep 3/28/22 7/21/22  Radha Nina MD   vitamin D3 (CHOLECALCIFEROL) 50 mcg (2000 units) tablet Take 1 tablet (50 mcg) by mouth daily Take one tablet daily  Patient not taking: Reported on 9/6/2022 10/14/22   Meng Quezada MD       Allergies  No Known Allergies    Review of systems  A 10-point review of systems was negative    Examination  /56   Pulse 64   Temp 98.5  F (36.9  C)   Wt 94.7 kg (208 lb 12.8 oz)   SpO2 99%   BMI 34.48 kg/m    Body mass index is 34.48 kg/m .    Gen- well, NAD, A+Ox3, normal color  Lym- no palpable LAD  CVS- RRR  RS- CTA  Abd- Obese.  Extr- hands normal, no JAVIER  Skin- no rash or jaundice  Neuro- no asterixis  Psych- normal mood    Laboratory  Lab Results   Component Value Date     12/27/2022    POTASSIUM 4.2 12/27/2022    POTASSIUM 4.0 07/21/2022    CHLORIDE 104 12/27/2022    CHLORIDE 103 07/21/2022    CO2 25 12/27/2022    CO2 26 07/21/2022    BUN 16.5 12/27/2022    BUN 19 07/21/2022    CR 0.96 12/27/2022       Lab Results   Component Value Date    BILITOTAL 2.6 12/27/2022    ALT 31 12/27/2022    AST 48 12/27/2022    ALKPHOS 141 12/27/2022       Lab Results   Component Value Date    ALBUMIN 3.2 12/27/2022    ALBUMIN 2.6 07/21/2022    PROTTOTAL 7.9 12/27/2022        Lab Results   Component Value Date    WBC 4.1 12/27/2022    HGB 11.2 12/27/2022     12/27/2022     12/27/2022       Lab Results   Component Value Date    INR 1.51 12/27/2022    INR 1.6 04/12/2022     MELD-Na score: 17 at 12/27/2022 10:40 AM  MELD score: 15 at 12/27/2022 10:40 AM  Calculated from:  Serum Creatinine: 0.96 mg/dL (Using min of 1 mg/dL) at 12/27/2022 10:40 AM  Serum Sodium: 135 mmol/L at 12/27/2022 10:40 AM  Total Bilirubin: 2.6 mg/dL at 12/27/2022 10:40 AM  INR(ratio): 1.51 at 12/27/2022 10:40 AM  Age: 62 years    Radiology    ASSESSMENT AND PLAN:  Mrs. Slater has cirrhosis of the liver with decompensation.  This  was most likely related with her hepatitis C and alcohol.  Her hepatitis C was treated and she thinks that she might have received the hep C from a blood transfusion in the 1980's.  She has that treated with Mavyret with a sustained virological response.      For her decompensation, she has only 1 paracentesis.  Her MELD score went down to the 15 range and she is doing quite well.  She was cleared by Cardiology and is very stable.  She did finish the evaluation, it appears.  She was approved for listing, so she is listed.  She will anyway be seen here every 3 months, at which time we will do both surveillance for HCC and also we will monitor her for worsening.  Mrs. Slater will follow up with her primary care physician as far as healthcare maintenance is concerned, and I will see her here in 3 months.    I spent 30 minutes on this encounter of 12/27/2022 in chart reviewing, history taking, physical examination, and documentation.  I spent some of the time also in coordination of care and consult.    Meng Quezada MD  Hepatology  [facility]

## 2022-12-27 NOTE — PROGRESS NOTES
Fried Frailty Test    Not Frail (2/5 points)- reduced , low activity   Saw pt for virtual txp visit 8/25/22. At this time she reported some low energy. Doing PT most days for 20 minutes + walking a few blocks, trying to build up stamina     [facility] FraiLT-- Pre frail     Pertinent Comments on Frailty Test Components:  Weight: appears stable for at least 9 months; reports ongoing occasional fluid retention   Wt Readings from Last 10 Encounters:   12/27/22 94.7 kg (208 lb 12.8 oz)   09/28/22 91.2 kg (201 lb)   09/07/22 91.4 kg (201 lb 6.4 oz)   07/21/22 90.9 kg (200 lb 8 oz)   06/09/22 89.8 kg (198 lb)   06/07/22 93.1 kg (205 lb 4.8 oz)   03/28/22 90.8 kg (200 lb 1.6 oz)   10/31/21 100.2 kg (221 lb)     Reported Exhaustion/Energy Levels: she reports some days are more fatiguing than others; does ADLs independently     Activity: no routine activity; reports stopping PT since I talked with her this summer, but feels it may be worth restarting     Additional Comments: we reviewed  diet guidelines & getting protein at each mealtime, considering a protein supplement, etc

## 2022-12-27 NOTE — PROGRESS NOTES
[facility] Hepatology    Follow-up Visit    CHIEF COMPLAINT AND REASON FOR THE VISIT:  Hepatitis C and alcohol-related cirrhosis.    SUBJECTIVE:  Mrs. Marj Slater is a 62-year-old female with alcohol abuse and related chronic liver disease.  She has also a history of hepatitis C and she came to us with decompensated cirrhosis and evaluation also for liver transplantation.     Mrs. Slater for her hepatitis C was treated with Mavyret 8 weeks and she cleared the virus and achieved a sustained virological response.  She also abstained from alcoholic beverages on 10/31/2021 and improved since.  Historically, she had jaundice, abdominal distention and edema.  She did have 1 paracentesis done on 03/15/2022.  She also had problems with hepatic encephalopathy and has been on lactulose, which she has discontinued and currently is on rifaximin.    Today, she is relatively doing quite well, does not have any significant edema or abdominal distention.  She has no signs of encephalopathy, including lethargy or confusion.  She has also no gastrointestinal bleeding like melena, hematemesis or hematochezia.  Today, she states that she occasionally has some nausea, but no significant abdominal pain.  She is moving her bowels at least once a day with no blood in it and her weight has remained stable.  Her MELD score is 15.    Medical hx Surgical hx   Past Medical History:   Diagnosis Date     Chronic hepatitis C (H)      History of blood transfusion      History of pneumonectomy       No past surgical history on file.       Medications  Prior to Admission medications    Medication Sig Start Date End Date Taking? Authorizing Provider   bisacodyl (DULCOLAX) 5 MG EC tablet Take 2 tablets at 3 pm the day before your procedure. If your procedure is before 11 am, take 2 additional tablets at 11 pm. If your procedure is after 11 am, take 2 additional tablets at 6 am. For additional instructions refer to your colonoscopy prep  instructions. 12/21/22  Yes Michelle Perdue MD   docusate sodium (COLACE) 100 MG capsule Take 100 mg by mouth 2 times daily 4/4/22  Yes Reported, Patient   folic acid (FOLVITE) 1 MG tablet  7/6/22  Yes Reported, Patient   magnesium oxide (MAG-OX) 400 MG tablet Take 400 mg by mouth 2 times daily 4/4/22  Yes Reported, Patient   rifaximin (XIFAXAN) 550 MG TABS tablet Take 550 mg by mouth 4/4/22  Yes Reported, Patient   calcium carbonate-vitamin D (OSCAL W/D) 500-200 MG-UNIT tablet Take 1 tablet by mouth 2 times daily (with meals) Take one tab once in AM and once in PM with meals  Patient not taking: Reported on 9/7/2022 7/22/22   Meng Quezada MD   furosemide (LASIX) 40 MG tablet Take 1 tablet (40 mg) by mouth 2 times daily  Patient taking differently: Take 40 mg by mouth daily 20 mg during day. 3/28/22 7/21/22  Radha Nina MD   nicotine (NICORETTE) 2 MG gum Place 1 each (2 mg) inside cheek every hour as needed for smoking cessation  Patient not taking: Reported on 12/27/2022 9/28/22   Trent Rush MD   polyethylene glycol (GOLYTELY) 236 g suspension The night before the exam at 6 pm drink an 8-ounce glass every 15 minutes until the jug is half empty. If you arrive before 11 AM: Drink the other half of the Golytely jug at 11 PM night before procedure. If you arrive after 11 AM: Drink the other half of the Golytely jug at 6 AM day of procedure. For additional instructions refer to your colonoscopy prep instructions.  Patient not taking: Reported on 12/27/2022 12/21/22   Michelle Perdue MD   spironolactone (ALDACTONE) 100 MG tablet Take 1 tablet (100 mg) by mouth daily 3/29/22 7/21/22  Radha Nina MD   traZODone (DESYREL) 50 MG tablet Take 1 tablet (50 mg) by mouth nightly as needed for sleep 3/28/22 7/21/22  Radha Nina MD   vitamin D3 (CHOLECALCIFEROL) 50 mcg (2000 units) tablet Take 1 tablet (50 mcg) by mouth daily Take one tablet daily  Patient not taking: Reported on  9/6/2022 10/14/22   Meng Quezada MD       Allergies  No Known Allergies    Review of systems  A 10-point review of systems was negative    Examination  /56   Pulse 64   Temp 98.5  F (36.9  C)   Wt 94.7 kg (208 lb 12.8 oz)   SpO2 99%   BMI 34.48 kg/m    Body mass index is 34.48 kg/m .    Gen- well, NAD, A+Ox3, normal color  Lym- no palpable LAD  CVS- RRR  RS- CTA  Abd- Obese.  Extr- hands normal, no JAVIER  Skin- no rash or jaundice  Neuro- no asterixis  Psych- normal mood    Laboratory  Lab Results   Component Value Date     12/27/2022    POTASSIUM 4.2 12/27/2022    POTASSIUM 4.0 07/21/2022    CHLORIDE 104 12/27/2022    CHLORIDE 103 07/21/2022    CO2 25 12/27/2022    CO2 26 07/21/2022    BUN 16.5 12/27/2022    BUN 19 07/21/2022    CR 0.96 12/27/2022       Lab Results   Component Value Date    BILITOTAL 2.6 12/27/2022    ALT 31 12/27/2022    AST 48 12/27/2022    ALKPHOS 141 12/27/2022       Lab Results   Component Value Date    ALBUMIN 3.2 12/27/2022    ALBUMIN 2.6 07/21/2022    PROTTOTAL 7.9 12/27/2022        Lab Results   Component Value Date    WBC 4.1 12/27/2022    HGB 11.2 12/27/2022     12/27/2022     12/27/2022       Lab Results   Component Value Date    INR 1.51 12/27/2022    INR 1.6 04/12/2022     MELD-Na score: 17 at 12/27/2022 10:40 AM  MELD score: 15 at 12/27/2022 10:40 AM  Calculated from:  Serum Creatinine: 0.96 mg/dL (Using min of 1 mg/dL) at 12/27/2022 10:40 AM  Serum Sodium: 135 mmol/L at 12/27/2022 10:40 AM  Total Bilirubin: 2.6 mg/dL at 12/27/2022 10:40 AM  INR(ratio): 1.51 at 12/27/2022 10:40 AM  Age: 62 years    Radiology    ASSESSMENT AND PLAN:  Mrs. Slater has cirrhosis of the liver with decompensation.  This was most likely related with her hepatitis C and alcohol.  Her hepatitis C was treated and she thinks that she might have received the hep C from a blood transfusion in the 1980's.  She has that treated with Mavyret with a sustained virological  response.      For her decompensation, she has only 1 paracentesis.  Her MELD score went down to the 15 range and she is doing quite well.  She was cleared by Cardiology and is very stable.  She did finish the evaluation, it appears.  She was approved for listing, so she is listed.  She will anyway be seen here every 3 months, at which time we will do both surveillance for HCC and also we will monitor her for worsening.  Mrs. Slater will follow up with her primary care physician as far as healthcare maintenance is concerned, and I will see her here in 3 months.    I spent 30 minutes on this encounter of 12/27/2022 in chart reviewing, history taking, physical examination, and documentation.  I spent some of the time also in coordination of care and consult.    Meng Quezada MD  Hepatology  [facility]

## 2022-12-27 NOTE — NURSING NOTE
Chief Complaint   Patient presents with     RECHECK     Return visit.     Blood pressure 118/56, pulse 64, temperature 98.5  F (36.9  C), weight 94.7 kg (208 lb 12.8 oz), SpO2 99 %.    JUAN JOSEPH

## 2022-12-28 NOTE — PROGRESS NOTES
Transplant Social Work Services Progress Note        Date of Initial Social Work Evaluation: 7/14/22  Collaborated with: Liver Transplant Team     Data: Marj is active on the transplant waitlist.   Intervention: Blade met with Mraj in clinic for a supportive check in. Marj's sister accompanied her to the appointment but waited in the lobby during this visit. We reviewed the following information previously dicussed with primary SW Morales Butlerhoracio on 9/7/22 and obtained updates:    1) Chemical dependency treatment: Marj reports she will complete her outpatient program with [facility] next week.  I have advised her to request her counselor fax me a copy of her discharge summary.Marj has reduced her smoking but still needs to work towards quitting completely. She understands our team's recommendation to quit.     Update: Blade has received Marj's discharge summary and a copy of this has been uploaded to the EHR. Rola endorses continued sobriety and shares this has been going well. She has been attending her once weekly sober support group, when the weather permits. Blade inquired about virtual options should she not feel safe leaving her home; she reports she does not enjoy attending meetings this way. Marj reports she continues to be supported by her 3 temporary sponsors, as well.       2) Health Care Directive: Marj had mailed me her directive but it was invalid because she did not sign the document.  I provided her with a new directive and advised her to mail me a copy once it is complete.    Update: This document has been uploaded to the EHR. Marj had no ongoing questions or concerns about this.     3) Financial: Marj is receiving SNAP food benefits and she has applied for General Assistance through her county of residence.  She also applied for SSDI benefits a couple of months ago.  I provided education about Medicare eligibility by age and disability.    Update:  "Marj reports her inital SSDI application but was denied. She is working with a disability  and has appealed this denial. Marj reports she receives general assistance through the [facility] and receives $200/month. She uses these funds on gas and insurance. Marj did not have new financial concerns but shares she is hopeful that her SSDI will be approved.     4) Mental Health: Marj's sister feels Marj would benefit from participating in our support group.  Marj declines any need for individual therapy or medication evaluation.    Update: Marj has been attending the weekly support group and shares she this has been beneficial to her mental health. She reports she is doing okay overall, but is feeling \"sick and tired of being sick and tired\". Marj reports she is looking forward to feeling like she used to, prior to her illness. She reports she continues to be supported by her sister and brother in law, as well as a few close friends. She denied needing additional supports at this time.     Marj had no additional questions or concerns for this sw and was thankful for the check in.     Assessment: Marj appears motivated and eager to move towards transplant. She continues to follow the teams recommendations.     Education provided by SW: reviewed post transplant expectations and caregiver support  Plan: Primary Sw to continue to follow throughout Marj's transplant journey.        Mya Monge, AYANNA  SOT/BMT/CF Float      Covering for:    NIRAV Caldera, [facility]  Liver Transplant   Phone [phone]  Pager [phone]   "

## 2022-12-30 LAB
PLPETH BLD-MCNC: <10 NG/ML
POPETH BLD-MCNC: <10 NG/ML

## 2023-01-04 ENCOUNTER — ANESTHESIA EVENT (OUTPATIENT)
Dept: SURGERY | Facility: AMBULATORY SURGERY CENTER | Age: 63
End: 2023-01-04
Payer: COMMERCIAL

## 2023-01-05 ENCOUNTER — ANESTHESIA (OUTPATIENT)
Dept: SURGERY | Facility: AMBULATORY SURGERY CENTER | Age: 63
End: 2023-01-05
Payer: COMMERCIAL

## 2023-01-05 ENCOUNTER — HOSPITAL ENCOUNTER (OUTPATIENT)
Facility: AMBULATORY SURGERY CENTER | Age: 63
Discharge: HOME | End: 2023-01-05
Attending: STUDENT IN AN ORGANIZED HEALTH CARE EDUCATION/TRAINING PROGRAM
Payer: COMMERCIAL

## 2023-01-05 VITALS
HEIGHT: 65 IN | OXYGEN SATURATION: 98 % | DIASTOLIC BLOOD PRESSURE: 59 MMHG | BODY MASS INDEX: 33.99 KG/M2 | WEIGHT: 204 LBS | HEART RATE: 70 BPM | RESPIRATION RATE: 16 BRPM | SYSTOLIC BLOOD PRESSURE: 117 MMHG | TEMPERATURE: 97.8 F

## 2023-01-05 VITALS — HEART RATE: 75 BPM

## 2023-01-05 LAB — COLONOSCOPY: NORMAL

## 2023-01-05 PROCEDURE — 88305 TISSUE EXAM BY PATHOLOGIST: CPT | Mod: TC | Performed by: STUDENT IN AN ORGANIZED HEALTH CARE EDUCATION/TRAINING PROGRAM

## 2023-01-05 PROCEDURE — 45385 COLONOSCOPY W/LESION REMOVAL: CPT | Mod: 33

## 2023-01-05 PROCEDURE — 45380 COLONOSCOPY AND BIOPSY: CPT | Mod: 59,33

## 2023-01-05 PROCEDURE — 88305 TISSUE EXAM BY PATHOLOGIST: CPT | Mod: 26 | Performed by: STUDENT IN AN ORGANIZED HEALTH CARE EDUCATION/TRAINING PROGRAM

## 2023-01-05 RX ORDER — NALOXONE HYDROCHLORIDE 0.4 MG/ML
0.4 INJECTION, SOLUTION INTRAMUSCULAR; INTRAVENOUS; SUBCUTANEOUS
Status: DISCONTINUED | OUTPATIENT
Start: 2023-01-05 | End: 2023-01-06 | Stop reason: HOSPADM

## 2023-01-05 RX ORDER — ONDANSETRON 2 MG/ML
4 INJECTION INTRAMUSCULAR; INTRAVENOUS
Status: DISCONTINUED | OUTPATIENT
Start: 2023-01-05 | End: 2023-01-05 | Stop reason: HOSPADM

## 2023-01-05 RX ORDER — PROPOFOL 10 MG/ML
INJECTION, EMULSION INTRAVENOUS PRN
Status: DISCONTINUED | OUTPATIENT
Start: 2023-01-05 | End: 2023-01-05

## 2023-01-05 RX ORDER — LIDOCAINE 40 MG/G
CREAM TOPICAL
Status: DISCONTINUED | OUTPATIENT
Start: 2023-01-05 | End: 2023-01-05 | Stop reason: HOSPADM

## 2023-01-05 RX ORDER — ONDANSETRON 2 MG/ML
4 INJECTION INTRAMUSCULAR; INTRAVENOUS EVERY 6 HOURS PRN
Status: DISCONTINUED | OUTPATIENT
Start: 2023-01-05 | End: 2023-01-06 | Stop reason: HOSPADM

## 2023-01-05 RX ORDER — PROCHLORPERAZINE MALEATE 10 MG
10 TABLET ORAL EVERY 6 HOURS PRN
Status: DISCONTINUED | OUTPATIENT
Start: 2023-01-05 | End: 2023-01-06 | Stop reason: HOSPADM

## 2023-01-05 RX ORDER — PROPOFOL 10 MG/ML
INJECTION, EMULSION INTRAVENOUS CONTINUOUS PRN
Status: DISCONTINUED | OUTPATIENT
Start: 2023-01-05 | End: 2023-01-05

## 2023-01-05 RX ORDER — FLUMAZENIL 0.1 MG/ML
0.2 INJECTION, SOLUTION INTRAVENOUS
Status: ACTIVE | OUTPATIENT
Start: 2023-01-05 | End: 2023-01-05

## 2023-01-05 RX ORDER — SODIUM CHLORIDE, SODIUM LACTATE, POTASSIUM CHLORIDE, CALCIUM CHLORIDE 600; 310; 30; 20 MG/100ML; MG/100ML; MG/100ML; MG/100ML
INJECTION, SOLUTION INTRAVENOUS CONTINUOUS
Status: DISCONTINUED | OUTPATIENT
Start: 2023-01-05 | End: 2023-01-05 | Stop reason: HOSPADM

## 2023-01-05 RX ORDER — ONDANSETRON 4 MG/1
4 TABLET, ORALLY DISINTEGRATING ORAL EVERY 6 HOURS PRN
Status: DISCONTINUED | OUTPATIENT
Start: 2023-01-05 | End: 2023-01-06 | Stop reason: HOSPADM

## 2023-01-05 RX ORDER — NALOXONE HYDROCHLORIDE 0.4 MG/ML
0.2 INJECTION, SOLUTION INTRAMUSCULAR; INTRAVENOUS; SUBCUTANEOUS
Status: DISCONTINUED | OUTPATIENT
Start: 2023-01-05 | End: 2023-01-06 | Stop reason: HOSPADM

## 2023-01-05 RX ADMIN — SODIUM CHLORIDE, SODIUM LACTATE, POTASSIUM CHLORIDE, CALCIUM CHLORIDE: 600; 310; 30; 20 INJECTION, SOLUTION INTRAVENOUS at 07:27

## 2023-01-05 RX ADMIN — PROPOFOL 50 MG: 10 INJECTION, EMULSION INTRAVENOUS at 08:05

## 2023-01-05 RX ADMIN — PROPOFOL 200 MCG/KG/MIN: 10 INJECTION, EMULSION INTRAVENOUS at 08:03

## 2023-01-05 RX ADMIN — PROPOFOL 50 MG: 10 INJECTION, EMULSION INTRAVENOUS at 08:03

## 2023-01-05 RX ADMIN — PROPOFOL 40 MG: 10 INJECTION, EMULSION INTRAVENOUS at 08:29

## 2023-01-05 ASSESSMENT — ENCOUNTER SYMPTOMS: ORTHOPNEA: 0

## 2023-01-05 ASSESSMENT — LIFESTYLE VARIABLES: TOBACCO_USE: 0

## 2023-01-05 ASSESSMENT — COPD QUESTIONNAIRES: COPD: 0

## 2023-01-05 NOTE — ANESTHESIA POSTPROCEDURE EVALUATION
Patient: Marj Slater    Procedure: Procedure(s):  COLONOSCOPY, WITH POLYPECTOMY       Anesthesia Type:  MAC    Note:  Disposition: Outpatient   Postop Pain Control: Uneventful            Sign Out: Well controlled pain   PONV: No   Neuro/Psych: Uneventful            Sign Out: Acceptable/Baseline neuro status   Airway/Respiratory: Uneventful            Sign Out: Acceptable/Baseline resp. status   CV/Hemodynamics: Uneventful            Sign Out: Acceptable CV status; No obvious hypovolemia; No obvious fluid overload   Other NRE:    DID A NON-ROUTINE EVENT OCCUR?            Last vitals:  Vitals Value Taken Time   /59 01/05/23 0924   Temp 36.6  C (97.8  F) 01/05/23 0924   Pulse     Resp 16 01/05/23 0924   SpO2 98 % 01/05/23 0924       Electronically Signed By: Yessi Cuellar MD  January 5, 2023  12:47 PM

## 2023-01-05 NOTE — H&P
Marj BARCENAS [facility]  [id number]  female  62 year old      Reason for procedure/surgery: screening colonoscopy, colon 2009 with HP polyps    Patient Active Problem List   Diagnosis     Alcoholic cirrhosis of liver with ascites (H)     History of pneumonectomy     Prolapsed internal hemorrhoids     Abnormal cervical Papanicolaou smear       Past Surgical History:  History reviewed. No pertinent surgical history.    Past Medical History:   Past Medical History:   Diagnosis Date     Chronic hepatitis C (H)      History of blood transfusion      History of pneumonectomy        Social History:   Social History     Tobacco Use     Smoking status: Former     Packs/day: 0.25     Types: Cigarettes     Smokeless tobacco: Never     Tobacco comments:     2 cigs a week   Substance Use Topics     Alcohol use: Not Currently     Comment: Last ETOH use was 10/21       Family History:   Family History   Problem Relation Age of Onset     Breast Cancer Mother      Coronary Artery Disease Father        Allergies: No Known Allergies    Active Medications:   Current Outpatient Medications   Medication Sig Dispense Refill     bisacodyl (DULCOLAX) 5 MG EC tablet Take 2 tablets at 3 pm the day before your procedure. If your procedure is before 11 am, take 2 additional tablets at 11 pm. If your procedure is after 11 am, take 2 additional tablets at 6 am. For additional instructions refer to your colonoscopy prep instructions. 4 tablet 0     docusate sodium (COLACE) 100 MG capsule Take 100 mg by mouth 2 times daily       folic acid (FOLVITE) 1 MG tablet        magnesium oxide (MAG-OX) 400 MG tablet Take 400 mg by mouth 2 times daily       polyethylene glycol (GOLYTELY) 236 g suspension The night before the exam at 6 pm drink an 8-ounce glass every 15 minutes until the jug is half empty. If you arrive before 11 AM: Drink the other half of the [employer] jug at 11 PM night before procedure. If you arrive after 11 AM: Drink the other half of the  "Kell jug at 6 AM day of procedure. For additional instructions refer to your colonoscopy prep instructions. 4000 mL 0     rifaximin (XIFAXAN) 550 MG TABS tablet Take 550 mg by mouth       calcium carbonate-vitamin D (OSCAL W/D) 500-200 MG-UNIT tablet Take 1 tablet by mouth 2 times daily (with meals) Take one tab once in AM and once in PM with meals (Patient not taking: Reported on 9/7/2022) 180 tablet 4     furosemide (LASIX) 40 MG tablet Take 1 tablet (40 mg) by mouth 2 times daily (Patient taking differently: Take 40 mg by mouth daily 20 mg during day.) 60 tablet 0     nicotine (NICORETTE) 2 MG gum Place 1 each (2 mg) inside cheek every hour as needed for smoking cessation (Patient not taking: Reported on 12/27/2022) 40 each 11     spironolactone (ALDACTONE) 100 MG tablet Take 1 tablet (100 mg) by mouth daily 30 tablet 0     traZODone (DESYREL) 50 MG tablet Take 1 tablet (50 mg) by mouth nightly as needed for sleep 30 tablet 0     vitamin D3 (CHOLECALCIFEROL) 50 mcg (2000 units) tablet Take 1 tablet (50 mcg) by mouth daily Take one tablet daily (Patient not taking: Reported on 9/6/2022) 90 tablet 3       Systemic Review:   CONSTITUTIONAL: NEGATIVE for fever, chills, change in weight  ENT/MOUTH: NEGATIVE for ear, mouth and throat problems  RESP: NEGATIVE for significant cough or SOB  CV: NEGATIVE for chest pain, palpitations or peripheral edema    Physical Examination:   Vital Signs: /57 (BP Location: Right arm)   Pulse 70   Temp 97.3  F (36.3  C) (Temporal)   Resp 18   Ht 1.651 m (5' 5\")   Wt 92.5 kg (204 lb)   SpO2 98%   BMI 33.95 kg/m    GENERAL: healthy, alert and no distress  NECK: no adenopathy, no asymmetry, masses, or scars  RESP: lungs clear to auscultation - no rales, rhonchi or wheezes  CV: regular rate and rhythm, normal S1 S2, no S3 or S4, no murmur, click or rub, no peripheral edema and peripheral pulses strong  ABDOMEN: soft, nontender, no hepatosplenomegaly, no masses and bowel " sounds normal  MS: no gross musculoskeletal defects noted, no edema      Plan: Appropriate to proceed as scheduled.      Michelle Perdue MD  1/5/2023    PCP:  Shruti [facility]

## 2023-01-05 NOTE — LETTER
January 6, 2023      Marj BARCENAS Bryon  [address]  [place] [address]        Dear ,    We are writing to inform you of your test results.    The polyps removed during your recent colonoscopy were found to be adenomas - these are considered to be a precancerous polyp.  Please note there was NO cancer in the polyp.     Given the finding of this type of polyp, you are at higher risk for growing precancerous polyps in the future. I recommend that you undergo a repeat colonoscopy in 3-5 years. However, a sooner examination might be necessary if you start developing any symptoms such as rectal bleeding, change in bowel habits, anemia, etc.  Please discuss this with your primary physician at the time of your next office appointment.  Your primary physician will schedule this repeat colonoscopy at the appropriate time.    Please share this information with your family members so they can discuss with their primary physician their need for colorectal cancer screening.      It has been a pleasure to participate in your care.  Please call our clinic if you have any questions or concerns.          Resulted Orders   Surgical Pathology Exam   Result Value Ref Range    Case Report       Surgical Pathology Report                         Case: [id number]                                  Authorizing Provider:  Michelle Perdue MD          Collected:           01/05/2023 08:20 AM          Ordering Location:     [facility] OR  Received:            01/05/2023 08:51 AM                                 [place]                                                                  Pathologist:           Karla Rojo MD                                                          Specimens:   A) - Other, Ascending Colon Polyps x2                                                               B) - Other, Descending Colon Polyps x2                                                     Final Diagnosis       A. ASCENDING COLON  "POLYPS X2, POLYPECTOMY:  - Tubular adenoma(s); negative for high-grade dysplasia     B. DESCENDING COLON POLYPS X2, POLYPECTOMY:  - Tubular adenoma(s); negative for high-grade dysplasia         Clinical Information        Cirrhosis of liver       Gross Description       A(1). Other, Ascending Colon Polyps x2:  The specimen is received in formalin with proper patient identification, labeled \"ascending colon polyps x2\".  The specimen consists of 7 irregular tan pieces of soft tissue ranging from 0.2-0.4 cm in greatest dimension which are entirely submitted in cassette A1.  B(2). Other, Descending Colon Polyps x2:  The specimen is received in formalin with proper patient identification, labeled \"descending colon polyps x2\".  The specimen consists of 2 irregular tan pieces of soft tissue 0.2 cm and 0.3 cm in greatest dimension which are entirely submitted in cassette B1.      Microscopic Description       Microscopic examination has been performed.         Performing Labs       The technical component of this testing was completed at [facility]      Case Images         If you have any questions or concerns, please call the clinic at the number listed above.       Sincerely,      Michelle Perdue MD          "

## 2023-01-05 NOTE — ANESTHESIA PREPROCEDURE EVALUATION
"Anesthesia Pre-Procedure Evaluation    Patient: Marj Slater   MRN: [id number] : 1960        Procedure : Procedure(s):  COLONOSCOPY          Past Medical History:   Diagnosis Date     Chronic hepatitis C (H)      History of blood transfusion      History of pneumonectomy       History reviewed. No pertinent surgical history.   No Known Allergies   Social History     Tobacco Use     Smoking status: Former     Packs/day: 0.25     Types: Cigarettes     Smokeless tobacco: Never     Tobacco comments:     2 cigs a week   Substance Use Topics     Alcohol use: Not Currently     Comment: Last ETOH use was 10/21      Wt Readings from Last 1 Encounters:   23 92.5 kg (204 lb)        Anesthesia Evaluation   Pt has had prior anesthetic. Type: General.    No history of anesthetic complications       ROS/MED HX  ENT/Pulmonary:    (-) tobacco use, asthma, COPD and recent URI   Neurologic:       Cardiovascular:     (+) -----Previous cardiac testing   Echo: Date: 2022 Results:  \"Interpretation Summary     Left ventricular systolic function is normal.  The visual ejection fraction is 60-65%.  The right ventricle is normal in structure, function and size.  No hemodynamically significant valvular abnormalities on 2D or color flow  Imaging.\"  Stress Test: Date: Results:    ECG Reviewed: Date: Results:    Cath: Date: Results:   (-) JARQUIN, orthopnea/PND and syncope   METS/Exercise Tolerance: >4 METS    Hematologic:       Musculoskeletal:       GI/Hepatic: Comment: 3/2022 paracentesis. Since then has not had significant ascites or needed paracentesis.   Hx of hepatic encephalopathy in 2022, no recurrence.    (+) liver disease,  (-) GERD   Renal/Genitourinary:       Endo:       Psychiatric/Substance Use:       Infectious Disease:       Malignancy:       Other:          CT FFR 2022  \"Impression. No flow limiting lesions identified on CT fractional flow  reserve. In particular the mid right coronary artery lesion which " "was  the lesion in question is not flow-limiting based upon this study.\"    Physical Exam    Airway        Mallampati: II   TM distance: > 3 FB   Neck ROM: full   Mouth opening: > 3 cm    Respiratory Devices and Support         Dental     Comment: Multiple missing teeth and dental work. Patient denies anything loose.    (+) missing      Cardiovascular          Rhythm and rate: regular and normal     Pulmonary           breath sounds clear to auscultation           OUTSIDE LABS:  CBC:   Lab Results   Component Value Date    WBC 4.1 12/27/2022    WBC 4.9 09/07/2022    HGB 11.2 (L) 12/27/2022    HGB 10.1 (L) 09/07/2022    HCT 34.0 (L) 12/27/2022    HCT 29.9 (L) 09/07/2022     (L) 12/27/2022     (L) 09/07/2022     BMP:   Lab Results   Component Value Date     (L) 12/27/2022     (L) 09/07/2022    POTASSIUM 4.2 12/27/2022    POTASSIUM 3.7 09/07/2022    CHLORIDE 104 12/27/2022    CHLORIDE 102 09/07/2022    CO2 25 12/27/2022    CO2 23 09/07/2022    BUN 16.5 12/27/2022    BUN 14.0 09/07/2022    CR 0.96 (H) 12/27/2022    CR 0.90 09/07/2022     (H) 12/27/2022     (H) 09/07/2022     COAGS:   Lab Results   Component Value Date    PTT 42 (H) 10/31/2021    INR 1.51 (H) 12/27/2022     POC:   Lab Results   Component Value Date    HCGS Negative 07/21/2022     HEPATIC:   Lab Results   Component Value Date    ALBUMIN 3.2 (L) 12/27/2022    PROTTOTAL 7.9 12/27/2022    ALT 31 12/27/2022    AST 48 (H) 12/27/2022    GGT 23 03/15/2022    ALKPHOS 141 (H) 12/27/2022    BILITOTAL 2.6 (H) 12/27/2022    MAYNOR 41 (H) 03/22/2022     OTHER:   Lab Results   Component Value Date    DAWIT 9.5 12/27/2022    PHOS 3.5 07/21/2022    MAG 1.7 (L) 03/28/2022       Anesthesia Plan    ASA Status:  3   NPO Status:  NPO Appropriate    Anesthesia Type: MAC.     - Reason for MAC: straight local not clinically adequate              Consents    Anesthesia Plan(s) and associated risks, benefits, and realistic alternatives " discussed. Questions answered and patient/representative(s) expressed understanding.    - Discussed:     - Discussed with:  Patient         Postoperative Care            Comments:    Other Comments: Discussed plan for MAC, including possibility of awareness, risk of aspiration pneumonia, risk of hypoxia/low oxygen/airway obstruction requiring additional airway support/devices. Discussed alternatives. Discussed backup plan of general anesthesia and risks of general anesthesia, including sore throat/hoarse voice, abrasions/damage to lips/tongue/teeth, nausea, rare complications (including medication reactions, cardiac, pulmonary, recall). Ensured understanding, invited questions and all questions were answered.       H&P reviewed: Unable to attach H&P to encounter due to EHR limitations. H&P Update: appropriate H&P reviewed, patient examined. No interval changes since H&P (within 30 days).         Yessi Cuellar MD

## 2023-01-06 LAB
PATH REPORT.COMMENTS IMP SPEC: NORMAL
PATH REPORT.COMMENTS IMP SPEC: NORMAL
PATH REPORT.FINAL DX SPEC: NORMAL
PATH REPORT.GROSS SPEC: NORMAL
PATH REPORT.MICROSCOPIC SPEC OTHER STN: NORMAL
PATH REPORT.RELEVANT HX SPEC: NORMAL
PHOTO IMAGE: NORMAL

## 2023-01-23 DIAGNOSIS — K70.31 ALCOHOLIC CIRRHOSIS OF LIVER WITH ASCITES (H): ICD-10-CM

## 2023-01-23 DIAGNOSIS — K74.60 CIRRHOSIS OF LIVER WITH ASCITES, UNSPECIFIED HEPATIC CIRRHOSIS TYPE (H): Primary | ICD-10-CM

## 2023-01-23 DIAGNOSIS — R18.8 CIRRHOSIS OF LIVER WITH ASCITES, UNSPECIFIED HEPATIC CIRRHOSIS TYPE (H): Primary | ICD-10-CM

## 2023-01-23 RX ORDER — MEPERIDINE HYDROCHLORIDE 25 MG/ML
25 INJECTION INTRAMUSCULAR; INTRAVENOUS; SUBCUTANEOUS EVERY 30 MIN PRN
Status: CANCELLED | OUTPATIENT
Start: 2023-01-23

## 2023-01-23 RX ORDER — LIDOCAINE HYDROCHLORIDE 10 MG/ML
20 INJECTION, SOLUTION EPIDURAL; INFILTRATION; INTRACAUDAL; PERINEURAL ONCE
Status: CANCELLED | OUTPATIENT
Start: 2023-01-23 | End: 2023-01-23

## 2023-01-23 RX ORDER — ALBUMIN (HUMAN) 12.5 G/50ML
12.5 SOLUTION INTRAVENOUS
Status: CANCELLED | OUTPATIENT
Start: 2023-01-23

## 2023-01-23 RX ORDER — ALBUTEROL SULFATE 90 UG/1
1-2 AEROSOL, METERED RESPIRATORY (INHALATION)
Status: CANCELLED
Start: 2023-01-23

## 2023-01-23 RX ORDER — DIPHENHYDRAMINE HYDROCHLORIDE 50 MG/ML
50 INJECTION INTRAMUSCULAR; INTRAVENOUS
Status: CANCELLED
Start: 2023-01-23

## 2023-01-23 RX ORDER — ALBUTEROL SULFATE 0.83 MG/ML
2.5 SOLUTION RESPIRATORY (INHALATION)
Status: CANCELLED | OUTPATIENT
Start: 2023-01-23

## 2023-01-23 RX ORDER — METHYLPREDNISOLONE SODIUM SUCCINATE 125 MG/2ML
125 INJECTION, POWDER, LYOPHILIZED, FOR SOLUTION INTRAMUSCULAR; INTRAVENOUS
Status: CANCELLED
Start: 2023-01-23

## 2023-01-23 RX ORDER — EPINEPHRINE 1 MG/ML
0.3 INJECTION, SOLUTION, CONCENTRATE INTRAVENOUS EVERY 5 MIN PRN
Status: CANCELLED | OUTPATIENT
Start: 2023-01-23

## 2023-01-27 ENCOUNTER — HOSPITAL ENCOUNTER (OUTPATIENT)
Dept: ULTRASOUND IMAGING | Facility: HOSPITAL | Age: 63
Discharge: HOME | End: 2023-01-27
Attending: INTERNAL MEDICINE | Admitting: INTERNAL MEDICINE
Payer: COMMERCIAL

## 2023-01-27 DIAGNOSIS — K70.31 ALCOHOLIC CIRRHOSIS OF LIVER WITH ASCITES (H): ICD-10-CM

## 2023-01-27 DIAGNOSIS — K74.60 CIRRHOSIS OF LIVER WITH ASCITES, UNSPECIFIED HEPATIC CIRRHOSIS TYPE (H): ICD-10-CM

## 2023-01-27 DIAGNOSIS — R18.8 CIRRHOSIS OF LIVER WITH ASCITES, UNSPECIFIED HEPATIC CIRRHOSIS TYPE (H): ICD-10-CM

## 2023-01-27 PROCEDURE — 76705 ECHO EXAM OF ABDOMEN: CPT

## 2023-02-21 ENCOUNTER — HOSPITAL ENCOUNTER (OUTPATIENT)
Dept: PHYSICAL THERAPY | Facility: REHABILITATION | Age: 63
Discharge: HOME | End: 2023-02-21
Payer: COMMERCIAL

## 2023-02-21 DIAGNOSIS — M62.81 GENERALIZED MUSCLE WEAKNESS: ICD-10-CM

## 2023-02-21 DIAGNOSIS — R53.81 PHYSICAL DECONDITIONING: ICD-10-CM

## 2023-02-21 PROCEDURE — 97110 THERAPEUTIC EXERCISES: CPT | Mod: GP

## 2023-02-21 PROCEDURE — 97162 PT EVAL MOD COMPLEX 30 MIN: CPT | Mod: GP

## 2023-02-21 ASSESSMENT — 6 MINUTE WALK TEST (6MWT): TOTAL DISTANCE WALKED (FT): 765

## 2023-02-23 NOTE — PROGRESS NOTES
[facility]                                                                                   OUTPATIENT PHYSICAL THERAPY FUNCTIONAL EVALUATION  PLAN OF TREATMENT FOR OUTPATIENT REHABILITATION  (COMPLETE FOR INITIAL CLAIMS ONLY)  Patient's Last Name, First Name, M.I.  YOB: 1960  BryonMarj BARCENAS     Provider's Name   [facility]   Medical Record No.  [id number]     Start of Care Date:  02/21/23   Onset Date:  10/11/22   Type:     _X__PT   ____OT  ____SLP Medical Diagnosis:   M62.81 (ICD-10-CM) - Generalized muscle weakness  R53.81 (ICD-10-CM) - Physical deconditioning     PT Diagnosis:  (P) Chronic fatigue secondary to chronic liver disease and global weakness Visits from SOC:  1                              __________________________________________________________________________________  Plan of Treatment/Functional Goals:  balance training, neuromuscular re-education, strengthening, manual therapy           GOALS  1  (P) Patient will be independent with HEP in order to modulate symptoms and improve strength.  (P) 03/14/23    2  (P) Patient will perform 5x sit to  12 seconds or less in order to demonstrate decreased falls risk.  (P) 03/28/23    3  (P) Patient will tolerate walking 1 flight of stairs with step through patter in order to demonstrate improved function.  (P) 05/02/23    4  (P) Patient will tolerate doing dished with no increase in low back pain in order to demonstrate improved impairments.  (P) 05/02/23                                                Therapy Frequency:      Predicted Duration of Therapy Intervention:  (P) 1-2x/wk for 10 weeks    Mj Barnhart, PT                                    I CERTIFY THE NEED FOR THESE SERVICES FURNISHED UNDER        THIS PLAN OF TREATMENT AND WHILE UNDER MY CARE     (Physician co-signature of this  document indicates review and certification of the therapy plan).              Certification Date From:    2/21/2023   Certification Date To:   5/2/2023    Referring Provider:  Trent Rush MD    Initial Assessment  See Epic Evaluation- Start of Care Date: 02/21/23 02/21/23 0900   Quick Adds   Type of Visit Initial OP PT Evaluation   General Information   Start of Care Date 02/21/23   Referring Physician Ternt Rush MD   Orders Evaluate and Treat as Indicated   Order Date 10/11/22   Medical Diagnosis M62.81 (ICD-10-CM) - Generalized muscle weakness  R53.81 (ICD-10-CM) - Physical deconditioning   Onset of illness/injury or Date of Surgery 10/11/22   Precautions/Limitations no known precautions/limitations   Surgical/Medical history reviewed Yes   Pertinent history of current problem (include personal factors and/or comorbidities that impact the POC) Patient reports chief complaint of balance and chronic fatigue. Personal medical history includes chronic liver disease and fractured left ankle 2 years ago. Liver disease secondary to chronic EtOH use.  Hospital stay one year ago for cirrhosis of the liver. Aggravating factors include walking, bending over, and lifting/carry. Easing factors include rest. Impairments include fatigue, back and leg pain, and global upper extremity and lower extremity weakness. Functional limitations include doing the dishes, doing laundry, and walking at the grocery store.   Prior level of function comment Independent   Previous/Current Treatment Physical Therapy   Improvement after PT Mild   Current Community Support Family/friend caregiver   Living environment [facility]   Home/Community Accessibility Comments One flight of stairs   Assistive Devices Comments Shower chair   Patient/Family Goals Statement Improve fatigue   Fall Risk Screen   Fall screen completed by PT   Have you fallen 2 or more times in the past year? No   Have you fallen and had an injury in the  past year? No   Abuse Screen (yes response referral indicated)   Feels Unsafe at Home or Work/School no   Does Anyone Try to Keep You From Having Contact with Others or Doing Things Outside Your Home? no   Physical Signs of Abuse Present no   Pain   Patient currently in pain No   Pain comments Pain increases with activity and fatigue   Cognitive Status Examination   Orientation orientation to person, place and time   Posture   Posture Kyphosis   Strength   Manual Muscle Testing Quick Adds MMT: Hip;MMT: Shoulder;MMT: Knee;MMT: Elbow/Forearm   MMT: Shoulder, Rehab Eval   Shoulder Flexion - Left Side (4/5) good, left   Shoulder Extension - Left Side (4/5) good, left   Shoulder ABduction - Left Side (4/5) good, left   Shoulder Flexion - Right Side (4-/5) good minus, right   Shoulder Extension - Right Side (4-/5) good minus, right   Shoulder ABduction - Right Side (4-/5) good minus, right   MMT: Elbow/Forearm, Rehab Eval   Elbow Flexion - Left Side (4/5) good, left   Elbow Extension - Left Side (4/5) good, left   Elbow Flexion - Right Side (4/5) good, right   Elbow Extension - Right Side (4/5) good, right   MMT: Hip, Rehab Eval   Hip Flexion - Left Side (3+/5) fair plus, left   Hip Extension - Left Side (3+/5) fair plus, left   Hip ABduction - Left Side (4-/5) good minus, left   Hip ADduction - Left Side (4-/5) good minus, left   Hip Flexion - Right Side (4-/5) good minus, right   Hip Extension - Right Side (3+/5) fair plus, right   Hip ABduction - Right Side (4-/5) good minus, right   Hip ADduction - Right Side (4-/5) good minus, right   MMT: Knee, Rehab Eval   Knee Flexion - Left Side (4/5) good, left   Knee Extension - Left Side (4/5) good, left   Knee Flexion - Right Side (4/5) good, right   Knee Extension - Right Side (4/5) good, right   Gait   Gait Comments Slow careful gait   Gait Special Tests   Gait Special Tests SIX MINUTE WALK TEST   Gait Special Tests Six Minute Walk Test   Feet 765 Feet   Comments fatigue at  4 minutes   Balance Special Tests   Balance Special Tests Other   Balance special tests other 5x sit to stand: 18.46   Planned Therapy Interventions   Planned Therapy Interventions balance training;neuromuscular re-education;strengthening;manual therapy   Clinical Impression   Criteria for Skilled Therapeutic Interventions Met yes, treatment indicated   PT Diagnosis Chronic fatigue secondary to chronic liver disease and global weakness   Clinical Presentation Stable/Uncomplicated   Clinical Decision Making (Complexity) Moderate complexity   Predicted Duration of Therapy Intervention (days/wks) 1-2x/wk for 10 weeks   Risk & Benefits of therapy have been explained Yes   Patient, Family & other staff in agreement with plan of care Yes   Education Assessment   Preferred Learning Style Listening;Demonstration   Barriers to Learning No barriers   GOALS   PT Eval Goals 2;1;3;4   Goal 1   Goal Identifier 1   Goal Description Patient will be independent with HEP in order to modulate symptoms and improve strength.   Target Date 03/14/23   Goal 2   Goal Identifier 2   Goal Description Patient will perform 5x sit to  12 seconds or less in order to demonstrate decreased falls risk.   Target Date 03/28/23   Goal 3   Goal Identifier 3   Goal Description Patient will tolerate walking 1 flight of stairs with step through patter in order to demonstrate improved function.   Target Date 05/02/23   Goal 4   Goal Identifier 4   Goal Description Patient will tolerate doing dished with no increase in low back pain in order to demonstrate improved impairments.   Target Date 05/02/23   Total Evaluation Time   PT Eval, Moderate Complexity Minutes (33691) 25         Mj Barnhart, PT on 2/23/2023 at 12:18 PM      Signed with certification dates addended.   Mj Barnhart, PT on 3/10/2023 at 4:15 PM

## 2023-03-08 ENCOUNTER — HOSPITAL ENCOUNTER (OUTPATIENT)
Dept: PHYSICAL THERAPY | Facility: REHABILITATION | Age: 63
Discharge: HOME | End: 2023-03-08
Payer: COMMERCIAL

## 2023-03-08 DIAGNOSIS — M62.81 GENERALIZED MUSCLE WEAKNESS: Primary | ICD-10-CM

## 2023-03-08 DIAGNOSIS — R53.81 PHYSICAL DECONDITIONING: ICD-10-CM

## 2023-03-08 PROCEDURE — 97110 THERAPEUTIC EXERCISES: CPT | Mod: GP | Performed by: PHYSICAL THERAPIST

## 2023-03-08 PROCEDURE — 97750 PHYSICAL PERFORMANCE TEST: CPT | Mod: GP | Performed by: PHYSICAL THERAPIST

## 2023-03-08 PROCEDURE — 97112 NEUROMUSCULAR REEDUCATION: CPT | Mod: GP | Performed by: PHYSICAL THERAPIST

## 2023-03-10 NOTE — ADDENDUM NOTE
Encounter addended by: Mj Barnhart, PT on: 3/10/2023 4:15 PM   Actions taken: Clinical Note Signed, Flowsheet accepted

## 2023-03-24 ENCOUNTER — HOSPITAL ENCOUNTER (OUTPATIENT)
Dept: PHYSICAL THERAPY | Facility: REHABILITATION | Age: 63
Discharge: HOME | End: 2023-03-24
Payer: COMMERCIAL

## 2023-03-24 DIAGNOSIS — M62.81 GENERALIZED MUSCLE WEAKNESS: Primary | ICD-10-CM

## 2023-03-24 DIAGNOSIS — R53.81 PHYSICAL DECONDITIONING: ICD-10-CM

## 2023-03-24 PROCEDURE — 97110 THERAPEUTIC EXERCISES: CPT | Mod: GP

## 2023-03-24 PROCEDURE — 97112 NEUROMUSCULAR REEDUCATION: CPT | Mod: GP

## 2023-03-31 ENCOUNTER — HOSPITAL ENCOUNTER (OUTPATIENT)
Dept: PHYSICAL THERAPY | Facility: REHABILITATION | Age: 63
Discharge: HOME | End: 2023-03-31
Payer: COMMERCIAL

## 2023-03-31 DIAGNOSIS — R53.81 PHYSICAL DECONDITIONING: ICD-10-CM

## 2023-03-31 DIAGNOSIS — M62.81 GENERALIZED MUSCLE WEAKNESS: Primary | ICD-10-CM

## 2023-03-31 PROCEDURE — 97110 THERAPEUTIC EXERCISES: CPT | Mod: GP

## 2023-03-31 PROCEDURE — 97112 NEUROMUSCULAR REEDUCATION: CPT | Mod: GP

## 2023-04-12 ENCOUNTER — HOSPITAL ENCOUNTER (OUTPATIENT)
Dept: PHYSICAL THERAPY | Facility: REHABILITATION | Age: 63
Discharge: HOME | End: 2023-04-12
Payer: COMMERCIAL

## 2023-04-12 ENCOUNTER — TELEPHONE (OUTPATIENT)
Dept: TRANSPLANT | Facility: CLINIC | Age: 63
End: 2023-04-12

## 2023-04-12 PROCEDURE — 97112 NEUROMUSCULAR REEDUCATION: CPT | Mod: GP

## 2023-04-12 PROCEDURE — 97110 THERAPEUTIC EXERCISES: CPT | Mod: GP

## 2023-04-12 NOTE — PROGRESS NOTES
Transplant Social Work Services Phone Call      Data: I received a voice message from Anna requesting a call back re: insurance questions.  Intervention: I attempted to reach Anna but was unable.  I left her a voice message and provided my availability.  Assessment: incomplete  Education provided by AYANNA: incomplete  Plan: Awaiting a return call from Anna.      NIRAV Caldera, [facility]  Liver Transplant   Phone [phone]  Pager [phone]

## 2023-04-17 ENCOUNTER — TELEPHONE (OUTPATIENT)
Dept: TRANSPLANT | Facility: CLINIC | Age: 63
End: 2023-04-17
Payer: COMMERCIAL

## 2023-04-17 DIAGNOSIS — K74.60 CIRRHOSIS OF LIVER (H): Primary | ICD-10-CM

## 2023-04-17 NOTE — TELEPHONE ENCOUNTER
Transplant Social Work Services Phone Call      Data/Intervention: I received a return call from Anna.  She received a letter from Social Security reporting she meets the medication requirements for disability benefits.  Anna asked when/how she would apply for Medicare.    Assessment: Anna reports her date of disability was determined to be March of 2021.  She will qualify 2 years after she begins receiving disability payments.  She will also be eligible due to age (63) in September of this year.  I encouraged her to consult with [facility] Linkage Line once she know her Medicare eligibility month.  Education provided by : [place]'s Senior LinkAge Line  at [phone], Medicare eligibility by age and disability  Plan: I will remain available for the psychosocial needs of this patient.      NIRAV Caldera, [facility]  Liver Transplant   Phone [phone]  Pager [phone]

## 2023-04-19 ENCOUNTER — HOSPITAL ENCOUNTER (OUTPATIENT)
Dept: PHYSICAL THERAPY | Facility: REHABILITATION | Age: 63
Discharge: HOME | End: 2023-04-19
Payer: COMMERCIAL

## 2023-04-19 PROCEDURE — 97110 THERAPEUTIC EXERCISES: CPT | Mod: GP

## 2023-04-19 PROCEDURE — 97112 NEUROMUSCULAR REEDUCATION: CPT | Mod: GP

## 2023-04-25 ENCOUNTER — LAB (OUTPATIENT)
Dept: LAB | Facility: CLINIC | Age: 63
End: 2023-04-25
Payer: COMMERCIAL

## 2023-04-25 ENCOUNTER — OFFICE VISIT (OUTPATIENT)
Dept: GASTROENTEROLOGY | Facility: CLINIC | Age: 63
End: 2023-04-25
Attending: INTERNAL MEDICINE
Payer: COMMERCIAL

## 2023-04-25 VITALS
WEIGHT: 213.6 LBS | OXYGEN SATURATION: 100 % | SYSTOLIC BLOOD PRESSURE: 112 MMHG | BODY MASS INDEX: 35.54 KG/M2 | TEMPERATURE: 98.2 F | HEART RATE: 62 BPM | DIASTOLIC BLOOD PRESSURE: 71 MMHG

## 2023-04-25 DIAGNOSIS — K70.31 ALCOHOLIC CIRRHOSIS OF LIVER WITH ASCITES (H): ICD-10-CM

## 2023-04-25 DIAGNOSIS — K74.60 CIRRHOSIS OF LIVER (H): ICD-10-CM

## 2023-04-25 DIAGNOSIS — K70.31 ALCOHOLIC CIRRHOSIS OF LIVER WITH ASCITES (H): Primary | ICD-10-CM

## 2023-04-25 LAB
ALBUMIN SERPL BCG-MCNC: 3.2 G/DL (ref 3.5–5.2)
ALP SERPL-CCNC: 144 U/L (ref 35–104)
ALT SERPL W P-5'-P-CCNC: 25 U/L (ref 10–35)
ANION GAP SERPL CALCULATED.3IONS-SCNC: 6 MMOL/L (ref 7–15)
AST SERPL W P-5'-P-CCNC: 48 U/L (ref 10–35)
BILIRUB DIRECT SERPL-MCNC: 0.84 MG/DL (ref 0–0.3)
BILIRUB SERPL-MCNC: 2.7 MG/DL
BUN SERPL-MCNC: 11.7 MG/DL (ref 8–23)
CALCIUM SERPL-MCNC: 9.5 MG/DL (ref 8.8–10.2)
CHLORIDE SERPL-SCNC: 104 MMOL/L (ref 98–107)
CREAT SERPL-MCNC: 0.98 MG/DL (ref 0.51–0.95)
DEPRECATED HCO3 PLAS-SCNC: 26 MMOL/L (ref 22–29)
ERYTHROCYTE [DISTWIDTH] IN BLOOD BY AUTOMATED COUNT: 14.4 % (ref 10–15)
GFR SERPL CREATININE-BSD FRML MDRD: 64 ML/MIN/1.73M2
GLUCOSE SERPL-MCNC: 131 MG/DL (ref 70–99)
HCT VFR BLD AUTO: 34.5 % (ref 35–47)
HGB BLD-MCNC: 11.4 G/DL (ref 11.7–15.7)
INR PPP: 1.58 (ref 0.85–1.15)
MCH RBC QN AUTO: 32.7 PG (ref 26.5–33)
MCHC RBC AUTO-ENTMCNC: 33 G/DL (ref 31.5–36.5)
MCV RBC AUTO: 99 FL (ref 78–100)
PLATELET # BLD AUTO: 105 10E3/UL (ref 150–450)
POTASSIUM SERPL-SCNC: 4.6 MMOL/L (ref 3.4–5.3)
PROT SERPL-MCNC: 8.1 G/DL (ref 6.4–8.3)
RBC # BLD AUTO: 3.49 10E6/UL (ref 3.8–5.2)
SODIUM SERPL-SCNC: 136 MMOL/L (ref 136–145)
TSH SERPL DL<=0.005 MIU/L-ACNC: 2.4 UIU/ML (ref 0.3–4.2)
WBC # BLD AUTO: 4.4 10E3/UL (ref 4–11)

## 2023-04-25 PROCEDURE — 80321 ALCOHOLS BIOMARKERS 1OR 2: CPT | Mod: 90 | Performed by: PATHOLOGY

## 2023-04-25 PROCEDURE — 99000 SPECIMEN HANDLING OFFICE-LAB: CPT | Performed by: PATHOLOGY

## 2023-04-25 PROCEDURE — 85610 PROTHROMBIN TIME: CPT | Performed by: PATHOLOGY

## 2023-04-25 PROCEDURE — G0463 HOSPITAL OUTPT CLINIC VISIT: HCPCS | Performed by: INTERNAL MEDICINE

## 2023-04-25 PROCEDURE — 82248 BILIRUBIN DIRECT: CPT | Performed by: PATHOLOGY

## 2023-04-25 PROCEDURE — 80053 COMPREHEN METABOLIC PANEL: CPT | Performed by: PATHOLOGY

## 2023-04-25 PROCEDURE — 99214 OFFICE O/P EST MOD 30 MIN: CPT | Performed by: INTERNAL MEDICINE

## 2023-04-25 PROCEDURE — 85027 COMPLETE CBC AUTOMATED: CPT | Performed by: PATHOLOGY

## 2023-04-25 PROCEDURE — 36415 COLL VENOUS BLD VENIPUNCTURE: CPT | Performed by: PATHOLOGY

## 2023-04-25 PROCEDURE — 84443 ASSAY THYROID STIM HORMONE: CPT | Performed by: PATHOLOGY

## 2023-04-25 ASSESSMENT — PAIN SCALES - GENERAL: PAINLEVEL: NO PAIN (0)

## 2023-04-25 NOTE — NURSING NOTE
Chief Complaint   Patient presents with     RECHECK     Return visit.     Blood pressure 112/71, pulse 62, temperature 98.2  F (36.8  C), weight 96.9 kg (213 lb 9.6 oz), SpO2 100 %.    JUAN JOSEPH

## 2023-04-25 NOTE — PROGRESS NOTES
[facility] Hepatology    Follow-up Visit    CHIEF COMPLAINT AND REASON FOR THE VISIT:  Hepatitis C and alcohol-related cirrhosis.    SUBJECTIVE:  Ms. Marj Slater is a 62-year-old female with a history of alcohol abuse and related chronic liver disease. She also had a history of hepatitis C.  She had decompensation and, in fact, had fluid retention at that time.     For her hepatitis C, she got treated with Mayvret 8 weeks and she cleared and achieved a sustained virological response.  Ms. Slater also has abstained from any alcoholic beverages since 10/31/2021 and she clinically improved.  The fluid retention resolved and she also improved as far as her mental status is concerned and is currently on rifaximin and lactulose.  Her last upper endoscopy did not show esophageal varices.  Ms. Slater was evaluated here for liver transplantation.  In fact, she finished the full evaluation, but her MELD score went down.      Today she tells me that she has no jaundice, no fluid retention, including edema or ascites.  She denies also any gastrointestinal bleeding like melena, hematemesis or hematochezia.  She had also denied any abdominal pain, nausea, vomiting.  She is moving her bowels at least once a day.  She gained some weight and she claims that she went up around 5 pounds in the last 4 months.  As far as the COVID vaccination is concerned, she has done 4 doses so far.    Medical hx Surgical hx   Past Medical History:   Diagnosis Date     Chronic hepatitis C (H)      History of blood transfusion      History of pneumonectomy       Past Surgical History:   Procedure Laterality Date     COLONOSCOPY N/A 1/5/2023    Procedure: COLONOSCOPY, WITH POLYPECTOMY;  Surgeon: Michelle Predue MD;  Location: [facility] OR          Medications  Prior to Admission medications    Medication Sig Start Date End Date Taking? Authorizing Provider   docusate sodium (COLACE) 100 MG capsule Take 100 mg by mouth 2 times daily 4/4/22  Yes Reported,  Patient   folic acid (FOLVITE) 1 MG tablet  7/6/22  Yes Reported, Patient   magnesium oxide (MAG-OX) 400 MG tablet Take 400 mg by mouth 2 times daily 4/4/22  Yes Reported, Patient   rifaximin (XIFAXAN) 550 MG TABS tablet Take 550 mg by mouth 4/4/22  Yes Reported, Patient   furosemide (LASIX) 40 MG tablet Take 1 tablet (40 mg) by mouth 2 times daily  Patient taking differently: Take 40 mg by mouth daily 20 mg during day. 3/28/22 7/21/22  Radha Nina MD   spironolactone (ALDACTONE) 100 MG tablet Take 1 tablet (100 mg) by mouth daily 3/29/22 7/21/22  Radha Nina MD   traZODone (DESYREL) 50 MG tablet Take 1 tablet (50 mg) by mouth nightly as needed for sleep 3/28/22 7/21/22  Radha Nina MD       Allergies  No Known Allergies    Review of systems  A 10-point review of systems was negative    Examination  /71   Pulse 62   Temp 98.2  F (36.8  C)   Wt 96.9 kg (213 lb 9.6 oz)   SpO2 100%   BMI 35.54 kg/m    Body mass index is 35.54 kg/m .    Gen- well, NAD, A+Ox3, normal color  Lym- no palpable LAD  CVS- RRR  RS- CTA  Abd- Not distended. No shifting dullness.  Extr- hands normal, no JAVIER  Skin- no rash or jaundice  Neuro- no asterixis  Psych- normal mood    Laboratory  Lab Results   Component Value Date     04/25/2023    POTASSIUM 4.6 04/25/2023    POTASSIUM 4.0 07/21/2022    CHLORIDE 104 04/25/2023    CHLORIDE 103 07/21/2022    CO2 26 04/25/2023    CO2 26 07/21/2022    BUN 11.7 04/25/2023    BUN 19 07/21/2022    CR 0.98 04/25/2023       Lab Results   Component Value Date    BILITOTAL 2.7 04/25/2023    ALT 25 04/25/2023    AST 48 04/25/2023    ALKPHOS 144 04/25/2023       Lab Results   Component Value Date    ALBUMIN 3.2 04/25/2023    ALBUMIN 2.6 07/21/2022    PROTTOTAL 8.1 04/25/2023        Lab Results   Component Value Date    WBC 4.4 04/25/2023    HGB 11.4 04/25/2023    MCV 99 04/25/2023     04/25/2023       Lab Results   Component Value Date    INR 1.58 04/25/2023    INR 1.6  04/12/2022     MELD-Na score: 16 at 4/25/2023 11:55 AM  MELD score: 15 at 4/25/2023 11:55 AM  Calculated from:  Serum Creatinine: 0.98 mg/dL (Using min of 1 mg/dL) at 4/25/2023 11:55 AM  Serum Sodium: 136 mmol/L at 4/25/2023 11:55 AM  Total Bilirubin: 2.7 mg/dL at 4/25/2023 11:55 AM  INR(ratio): 1.58 at 4/25/2023 11:55 AM  Age: 62 years    Radiology    ASSESSMENT AND PLAN:  Cirrhosis of the liver.  Mrs. Slater has cirrhosis of the liver related with hep C and alcohol.  She initially came with decompensation with fluid retention and hepatic encephalopathy.  She did well after that.  Her MELD score dropped and her fluid retention resolved.  She has also been on lactulose and rifaximin and her hepatic encephalopathy is also well controlled.      As far as her hepatitis C is concerned, she got Mavyret 8 weeks and she achieved a sustained virological response.  Since her MELD score dropped to 15 last time and now she has finished the evaluation for liver transplantation, we might re-discuss her condition and see if we can put her on-hold as her MELD score is very low, unless the patient is interested in going forward with living donor liver transplantation.  She will be seen here in 3 months and she will see one of us here.  Mrs. Slater will also need to be on surveillance for HCC and she will have that done.  Mrs. Slater will also follow up with her primary care physician regarding other healthcare maintenance issues.  She will be seen here in 3 months.    I spent 30 minutes on the encounter of 04/25/2023 in chart reviewing, history taking, physical examination, and documentation.  I spent some time in coordination of care and counseling.    Meng Quezada MD  Hepatology  [facility]

## 2023-04-25 NOTE — LETTER
4/25/2023         RE: Marj Slater  [address] [facility] [address]        Dear Colleague,    Thank you for referring your patient, Marj Slater, to the [facility] [place]. Please see a copy of my visit note below.    [facility]    Follow-up Visit    CHIEF COMPLAINT AND REASON FOR THE VISIT:  Hepatitis C and alcohol-related cirrhosis.    SUBJECTIVE:  Ms. Marj Slater is a 62-year-old female with a history of alcohol abuse and related chronic liver disease. She also had a history of hepatitis C.  She had decompensation and, in fact, had fluid retention at that time.     For her hepatitis C, she got treated with Mayvret 8 weeks and she cleared and achieved a sustained virological response.  Ms. Slater also has abstained from any alcoholic beverages since 10/31/2021 and she clinically improved.  The fluid retention resolved and she also improved as far as her mental status is concerned and is currently on rifaximin and lactulose.  Her last upper endoscopy did not show esophageal varices.  Ms. Slater was evaluated here for liver transplantation.  In fact, she finished the full evaluation, but her MELD score went down.      Today she tells me that she has no jaundice, no fluid retention, including edema or ascites.  She denies also any gastrointestinal bleeding like melena, hematemesis or hematochezia.  She had also denied any abdominal pain, nausea, vomiting.  She is moving her bowels at least once a day.  She gained some weight and she claims that she went up around 5 pounds in the last 4 months.  As far as the COVID vaccination is concerned, she has done 4 doses so far.    Medical hx Surgical hx   Past Medical History:   Diagnosis Date    Chronic hepatitis C (H)     History of blood transfusion     History of pneumonectomy       Past Surgical History:   Procedure Laterality Date    COLONOSCOPY N/A 1/5/2023    Procedure: COLONOSCOPY, WITH POLYPECTOMY;   Surgeon: Michelle Perdue MD;  Location: [facility] OR          Medications  Prior to Admission medications    Medication Sig Start Date End Date Taking? Authorizing Provider   docusate sodium (COLACE) 100 MG capsule Take 100 mg by mouth 2 times daily 4/4/22  Yes Reported, Patient   folic acid (FOLVITE) 1 MG tablet  7/6/22  Yes Reported, Patient   magnesium oxide (MAG-OX) 400 MG tablet Take 400 mg by mouth 2 times daily 4/4/22  Yes Reported, Patient   rifaximin (XIFAXAN) 550 MG TABS tablet Take 550 mg by mouth 4/4/22  Yes Reported, Patient   furosemide (LASIX) 40 MG tablet Take 1 tablet (40 mg) by mouth 2 times daily  Patient taking differently: Take 40 mg by mouth daily 20 mg during day. 3/28/22 7/21/22  Radha Nina MD   spironolactone (ALDACTONE) 100 MG tablet Take 1 tablet (100 mg) by mouth daily 3/29/22 7/21/22  Radha Nina MD   traZODone (DESYREL) 50 MG tablet Take 1 tablet (50 mg) by mouth nightly as needed for sleep 3/28/22 7/21/22  Radha Nina MD       Allergies  No Known Allergies    Review of systems  A 10-point review of systems was negative    Examination  /71   Pulse 62   Temp 98.2  F (36.8  C)   Wt 96.9 kg (213 lb 9.6 oz)   SpO2 100%   BMI 35.54 kg/m    Body mass index is 35.54 kg/m .    Gen- well, NAD, A+Ox3, normal color  Lym- no palpable LAD  CVS- RRR  RS- CTA  Abd- Not distended. No shifting dullness.  Extr- hands normal, no JAVIER  Skin- no rash or jaundice  Neuro- no asterixis  Psych- normal mood    Laboratory  Lab Results   Component Value Date     04/25/2023    POTASSIUM 4.6 04/25/2023    POTASSIUM 4.0 07/21/2022    CHLORIDE 104 04/25/2023    CHLORIDE 103 07/21/2022    CO2 26 04/25/2023    CO2 26 07/21/2022    BUN 11.7 04/25/2023    BUN 19 07/21/2022    CR 0.98 04/25/2023       Lab Results   Component Value Date    BILITOTAL 2.7 04/25/2023    ALT 25 04/25/2023    AST 48 04/25/2023    ALKPHOS 144 04/25/2023       Lab Results   Component Value Date    ALBUMIN 3.2  04/25/2023    ALBUMIN 2.6 07/21/2022    PROTTOTAL 8.1 04/25/2023        Lab Results   Component Value Date    WBC 4.4 04/25/2023    HGB 11.4 04/25/2023    MCV 99 04/25/2023     04/25/2023       Lab Results   Component Value Date    INR 1.58 04/25/2023    INR 1.6 04/12/2022     MELD-Na score: 16 at 4/25/2023 11:55 AM  MELD score: 15 at 4/25/2023 11:55 AM  Calculated from:  Serum Creatinine: 0.98 mg/dL (Using min of 1 mg/dL) at 4/25/2023 11:55 AM  Serum Sodium: 136 mmol/L at 4/25/2023 11:55 AM  Total Bilirubin: 2.7 mg/dL at 4/25/2023 11:55 AM  INR(ratio): 1.58 at 4/25/2023 11:55 AM  Age: 62 years    Radiology    ASSESSMENT AND PLAN:  Cirrhosis of the liver.  Mrs. Slater has cirrhosis of the liver related with hep C and alcohol.  She initially came with decompensation with fluid retention and hepatic encephalopathy.  She did well after that.  Her MELD score dropped and her fluid retention resolved.  She has also been on lactulose and rifaximin and her hepatic encephalopathy is also well controlled.      As far as her hepatitis C is concerned, she got Mavyret 8 weeks and she achieved a sustained virological response.  Since her MELD score dropped to 15 last time and now she has finished the evaluation for liver transplantation, we might re-discuss her condition and see if we can put her on-hold as her MELD score is very low, unless the patient is interested in going forward with living donor liver transplantation.  She will be seen here in 3 months and she will see one of us here.  Mrs. Slater will also need to be on surveillance for HCC and she will have that done.  Mrs. Slater will also follow up with her primary care physician regarding other healthcare maintenance issues.  She will be seen here in 3 months.    I spent 30 minutes on the encounter of 04/25/2023 in chart reviewing, history taking, physical examination, and documentation.  I spent some time in coordination of care and counseling.    Meng Huber  MD Damien  Hepatology  [facility]

## 2023-04-27 NOTE — ADDENDUM NOTE
Encounter addended by: Mj Barnhart, PT on: 4/27/2023 12:22 PM   Actions taken: Charge Capture section accepted

## 2023-04-28 ENCOUNTER — TELEPHONE (OUTPATIENT)
Dept: TRANSPLANT | Facility: CLINIC | Age: 63
End: 2023-04-28
Payer: COMMERCIAL

## 2023-04-28 DIAGNOSIS — K70.31 ALCOHOLIC CIRRHOSIS OF LIVER WITH ASCITES (H): Primary | ICD-10-CM

## 2023-04-28 LAB
PLPETH BLD-MCNC: <10 NG/ML
POPETH BLD-MCNC: <10 NG/ML

## 2023-04-28 RX ORDER — MAGNESIUM OXIDE 400 MG/1
400 TABLET ORAL 2 TIMES DAILY
Qty: 90 TABLET | Refills: 3 | Status: SHIPPED | OUTPATIENT
Start: 2023-04-28 | End: 2024-07-03

## 2023-04-28 NOTE — PROGRESS NOTES
April 28, 2023 1:39 PM - Heriberto Salcedo RN:     Order entered for magnesium oxide refill to [facility]. [employer] message sent to patient.

## 2023-04-28 NOTE — TELEPHONE ENCOUNTER
Patient Call: Medication Refill  Route to LPN  Instruct the patient to first contact their pharmacy. If they have called their pharmacy and require further assistance, route to LPN.    [facility] Name: [facility]  Pharmacy Location: [place]  Name of Medication: Mag Oxide  When will the patient be out of this medication?: Less than 3 days (Route high priority)

## 2023-05-22 RX ORDER — MULTIPLE VITAMINS W/ MINERALS TAB 9MG-400MCG
1 TAB ORAL DAILY
Qty: 90 TABLET | Refills: 3 | Status: SHIPPED | OUTPATIENT
Start: 2023-05-22 | End: 2024-05-13

## 2023-05-22 NOTE — TELEPHONE ENCOUNTER
Spoke with patient who reported doing well and feeling well.    Patient is gaining, weight although she does not think it is too much muscles. Does not sound like fluid either    Reviewed deferring patient for listing given she is doing well, has low-jessika MELD with ABO A.     She does not have anyone she can think of to discuss being a possible live donor, and she thinks/agrees she does not need a transplant now.    She has my direct number, and I instructed her to call with any changes for the worse. Reminded her to stay away from alcohol.    Reviewed next follow up appt on 10/30 with Dr. Leventhal.     Will close eval at this time

## 2023-05-30 ENCOUNTER — COMMITTEE REVIEW (OUTPATIENT)
Dept: TRANSPLANT | Facility: CLINIC | Age: 63
End: 2023-05-30

## 2023-05-30 NOTE — COMMITTEE REVIEW
Abdominal Committee Review Note     Evaluation Date: 7/20/2022  Committee Review Date: 5/30/2023    Organ being evaluated for: Liver    Transplant Phase: Evaluation  Transplant Status: Active    Transplant Coordinator: Lewis Carrizales Jr.  Transplant Surgeon:   Jaime Olson    Referring Physician: Sha Vargas    Primary Diagnosis: Alcoholic Cirrhosis with Hepatitis C  Secondary Diagnosis:     Committee Review Members:  Nutrition Virginia Goins, RD   Pharmacist Hailey Fitch, [facility]    - Clinical Morales Cesar, NIRAV, Sue Hale, [facility]   Transplant Sophia Porras, RN, Bonnie Mendez LPN, Milagros Maria, RN, Alyssa Herrera, ADRYAN, Jr Lewis Carrizales, ADRAYN, Martine Ridley, APRN CNP, Heriberto Salcedo, ADRYAN, Leesa Light, ADRYAN   Transplant Hepatology  Yoli Smith MD, Trent Sierra MD, Stephanie Acuna MD, Michelle Perdue MD, Meng Quezada MD, Shaan Jeter MD, Thomas M. Leventhal, MD, Mikey Ellison MD   Transplant Surgery Too Stafford MD       Transplant Eligibility: Cirrhosis with MELD, ETOH, HCV    Committee Review Decision: Declined    Relative Contraindications: Other, patient choice and too well at this time    Absolute Contraindications: None    Committee Chair Leventhal, Thomas Michael, MD verbally attested to the committee's decision.    Committee Discussion Details:      Close - patient choice & doing too well

## 2023-05-30 NOTE — LETTER
May 30, 2023    Marj Ramireznn  [address] [facility] [address]    Dear Ms. Slater,   The purpose of this letter is to let you know that on 5/30/23 the [facility] Multi-Disciplinary Selection Team reviewed the results of your transplant evaluation. Based on the results of your evaluation and the selection criteria used by our program, the decision was made to not list you on the liver transplant list. This is because per our conversation you do not feel that you need a transplant at this time given your improvement, and our team agrees with your decision to hold off on transplant at this time.   Important things you should know:  If you would like to discuss the decision, or if your medical status changes you may schedule a return visits with your doctor by calling [phone] and asking to speak to your transplant coordinator.  We recommend that you continue to follow up with your primary care doctor in order to manage your health concerns.  Please remember your next liver appt is with Dr. Leventhal (Dr. Quezada is retiring) on 10/30/23 starting at 10:30 am.   We want you to know that our program has physician and surgeon coverage 24 hours a day, 365 days a year.  Attached is a letter from [employer] that describes the services and information offered to patients by [employer] and the Organ Procurement and Transplantation Network (OPTN).  Thank you for allowing us to participate in your care.  We wish you well.  Sincerely,    Lewis Carrizales Jr., BSN, RN  Liver Transplant Coordinator  [phone]    Enclosures: OS Letter  cc: Care Team            The Organ Procurement and Transplantation Network  Toll-free patient services line:     Your resource for organ transplant information    If you have a question regarding your own medical care, you always should call your transplant hospital first. However, for general organ transplant-related information, you can call the Organ  Procurement and Transplantation Network (OPTN) toll-free patient services line at [phone]- [phone]. Anyone, including potential transplant candidates, candidates, recipients, family members, friends, living donors, and donor family members, can call this number to:      Talk about organ donation, living donation, the transplant process, the donation process, and transplant policies.  Get a free patient information kit with helpful booklets, waiting list and transplant information, and a list of all transplant hospitals.  Ask questions about the OPTN website (https://optn.transplant.hrsa.gov/), the [employer] for Organ Sharing s ([employer]) website (https://unos.org/), or the [employer] website for living donors and transplant recipients. (https://www.transplantliving.org/).  Learn how the OPTN can help you.  Talk about any concerns that you may have with a transplant hospital.    The [facility] transplant system, the OPTN, is managed under federal contract by the [employer]aring ([employer]), which is a non-profit charitable organization. The OPTN helps create and define organ sharing policies that make the best use of donated organs. This process continuously evaluating new advances and discoveries so policies can be adapted to best serve patients waiting for transplants. To do so, the OPTN works closely with transplant professionals, transplant patients, transplant candidates, donor families, living donors, and the public. All transplant programs and organ procurement organizations throughout the country are OPTN members and are obligated to follow the policies the OPTN creates for allocating organs.    The OPTN also is responsible for:    Providing educational material for patients, the public, and professionals.  Raising awareness of the need for donated organs and tissue.  Coordinating organ procurement, matching, and placement.  Collecting information about every organ transplant and donation that occurs in  the [place].    Remember, you should contact your transplant hospital directly if you have questions or concerns about your own medical care including medical records, work-up progress, and test results.    We are not your transplant hospital, and our staff will not be able to answer questions about your case, so please keep your transplant hospital s phone number handy.    However, while you research your transplant needs and learn as much as you can about transplantation and donation, we welcome your call to our toll-free patient services line at [phone].          Updated 4/1/2019

## 2023-06-04 ENCOUNTER — HEALTH MAINTENANCE LETTER (OUTPATIENT)
Age: 63
End: 2023-06-04

## 2023-10-25 ENCOUNTER — TELEPHONE (OUTPATIENT)
Dept: GASTROENTEROLOGY | Facility: CLINIC | Age: 63
End: 2023-10-25
Payer: MEDICARE

## 2023-10-25 DIAGNOSIS — K70.31 ALCOHOLIC CIRRHOSIS OF LIVER WITH ASCITES (H): Primary | ICD-10-CM

## 2023-10-25 NOTE — TELEPHONE ENCOUNTER
Was the patient contacted by phone and reminded of the upcoming visit? Yes    Was the patient instructed to bring a current list of all medications to the appointment or instructed to bring in all medication bottles? Yes, patient verbalized understanding    Is it anticipated the patient will need additional appointments? Yes, imaging. Informed Marj that she needs to fast for 8 hours prior to ultrasound, she verbally understood and agreed.     Were the additional appointments scheduled? Yes      Was the patient instructed to arrive prior to the appointment time to have ordered labs drawn? Yes, patient verbalized understanding    Were the needed lab orders placed? Yes    Was lab appointment made 1 hour or more prior to visit? Yes    Enedelia Chino CMA  10/25/2023 3:22 PM

## 2023-10-30 ENCOUNTER — LAB (OUTPATIENT)
Dept: LAB | Facility: CLINIC | Age: 63
End: 2023-10-30
Attending: INTERNAL MEDICINE
Payer: MEDICARE

## 2023-10-30 ENCOUNTER — ANCILLARY PROCEDURE (OUTPATIENT)
Dept: ULTRASOUND IMAGING | Facility: CLINIC | Age: 63
End: 2023-10-30
Attending: INTERNAL MEDICINE
Payer: MEDICARE

## 2023-10-30 ENCOUNTER — OFFICE VISIT (OUTPATIENT)
Dept: GASTROENTEROLOGY | Facility: CLINIC | Age: 63
End: 2023-10-30
Attending: INTERNAL MEDICINE
Payer: MEDICARE

## 2023-10-30 VITALS
DIASTOLIC BLOOD PRESSURE: 63 MMHG | SYSTOLIC BLOOD PRESSURE: 118 MMHG | HEIGHT: 64 IN | HEART RATE: 72 BPM | WEIGHT: 223 LBS | BODY MASS INDEX: 38.07 KG/M2

## 2023-10-30 DIAGNOSIS — E44.1 MILD PROTEIN-CALORIE MALNUTRITION (H): ICD-10-CM

## 2023-10-30 DIAGNOSIS — F10.21 ALCOHOL DEPENDENCE, IN REMISSION (H): ICD-10-CM

## 2023-10-30 DIAGNOSIS — K70.31 ALCOHOLIC CIRRHOSIS OF LIVER WITH ASCITES (H): ICD-10-CM

## 2023-10-30 DIAGNOSIS — I85.10 SECONDARY ESOPHAGEAL VARICES WITHOUT BLEEDING (H): ICD-10-CM

## 2023-10-30 DIAGNOSIS — Z12.9 SCREENING FOR CANCER: ICD-10-CM

## 2023-10-30 DIAGNOSIS — K70.31 ALCOHOLIC CIRRHOSIS OF LIVER WITH ASCITES (H): Primary | ICD-10-CM

## 2023-10-30 DIAGNOSIS — F33.9 RECURRENT MAJOR DEPRESSIVE DISORDER, REMISSION STATUS UNSPECIFIED (H): ICD-10-CM

## 2023-10-30 PROBLEM — D59.11: Status: ACTIVE | Noted: 2023-10-30

## 2023-10-30 LAB
AFP SERPL-MCNC: 3.4 NG/ML
ALBUMIN SERPL BCG-MCNC: 3.2 G/DL (ref 3.5–5.2)
ALP SERPL-CCNC: 101 U/L (ref 35–104)
ALT SERPL W P-5'-P-CCNC: 29 U/L (ref 0–50)
ANION GAP SERPL CALCULATED.3IONS-SCNC: 9 MMOL/L (ref 7–15)
AST SERPL W P-5'-P-CCNC: 44 U/L (ref 0–45)
BILIRUB DIRECT SERPL-MCNC: 0.68 MG/DL (ref 0–0.3)
BILIRUB SERPL-MCNC: 2.2 MG/DL
BUN SERPL-MCNC: 13.1 MG/DL (ref 8–23)
CALCIUM SERPL-MCNC: 9 MG/DL (ref 8.8–10.2)
CHLORIDE SERPL-SCNC: 104 MMOL/L (ref 98–107)
CREAT SERPL-MCNC: 0.93 MG/DL (ref 0.51–0.95)
DEPRECATED HCO3 PLAS-SCNC: 24 MMOL/L (ref 22–29)
EGFRCR SERPLBLD CKD-EPI 2021: 68 ML/MIN/1.73M2
ERYTHROCYTE [DISTWIDTH] IN BLOOD BY AUTOMATED COUNT: 14.5 % (ref 10–15)
GLUCOSE SERPL-MCNC: 108 MG/DL (ref 70–99)
HCT VFR BLD AUTO: 36.8 % (ref 35–47)
HGB BLD-MCNC: 12.7 G/DL (ref 11.7–15.7)
INR PPP: 1.63 (ref 0.85–1.15)
MCH RBC QN AUTO: 32.6 PG (ref 26.5–33)
MCHC RBC AUTO-ENTMCNC: 34.5 G/DL (ref 31.5–36.5)
MCV RBC AUTO: 94 FL (ref 78–100)
PLATELET # BLD AUTO: 145 10E3/UL (ref 150–450)
POTASSIUM SERPL-SCNC: 4.1 MMOL/L (ref 3.4–5.3)
PROT SERPL-MCNC: 7.8 G/DL (ref 6.4–8.3)
RBC # BLD AUTO: 3.9 10E6/UL (ref 3.8–5.2)
SODIUM SERPL-SCNC: 137 MMOL/L (ref 135–145)
WBC # BLD AUTO: 4.7 10E3/UL (ref 4–11)

## 2023-10-30 PROCEDURE — 99215 OFFICE O/P EST HI 40 MIN: CPT | Performed by: INTERNAL MEDICINE

## 2023-10-30 PROCEDURE — 36415 COLL VENOUS BLD VENIPUNCTURE: CPT | Performed by: PATHOLOGY

## 2023-10-30 PROCEDURE — 82105 ALPHA-FETOPROTEIN SERUM: CPT | Performed by: INTERNAL MEDICINE

## 2023-10-30 PROCEDURE — 99000 SPECIMEN HANDLING OFFICE-LAB: CPT | Performed by: PATHOLOGY

## 2023-10-30 PROCEDURE — 76700 US EXAM ABDOM COMPLETE: CPT | Mod: GC | Performed by: RADIOLOGY

## 2023-10-30 PROCEDURE — G0480 DRUG TEST DEF 1-7 CLASSES: HCPCS | Mod: 90 | Performed by: PATHOLOGY

## 2023-10-30 PROCEDURE — 82248 BILIRUBIN DIRECT: CPT | Performed by: PATHOLOGY

## 2023-10-30 PROCEDURE — 80053 COMPREHEN METABOLIC PANEL: CPT | Performed by: PATHOLOGY

## 2023-10-30 PROCEDURE — 85027 COMPLETE CBC AUTOMATED: CPT | Performed by: PATHOLOGY

## 2023-10-30 PROCEDURE — 85610 PROTHROMBIN TIME: CPT | Performed by: PATHOLOGY

## 2023-10-30 PROCEDURE — G0463 HOSPITAL OUTPT CLINIC VISIT: HCPCS | Performed by: INTERNAL MEDICINE

## 2023-10-30 RX ORDER — SPIRONOLACTONE 100 MG/1
100 TABLET, FILM COATED ORAL DAILY
Qty: 90 TABLET | Refills: 3 | Status: SHIPPED | OUTPATIENT
Start: 2023-10-30

## 2023-10-30 ASSESSMENT — PAIN SCALES - GENERAL: PAINLEVEL: NO PAIN (0)

## 2023-10-30 NOTE — PATIENT INSTRUCTIONS
"-Please start on Miralax (polyethylene glycol) and take once daily.  Take 17g with 8oz of water.  Target is one bowel movement daily    - You can hold the Xifaxan while you start on the miralax    - If you develop the symptoms of confusion or being in a \"fog or haze\" restart the Xifaxan and let our clinic know.    - Stop the lasix    - continue on the spironolactone    - BE ACTIVE!    If you need a prescription for rifaximin, this is where you want to contact    www.[employer]  Phone: [phone]  Fax: [phone]    [facility]   Ph: [phone]   Fax: [phone]   Email: info@[employer]         Cirrhosis Education    We recommend you go to the website: https://cirrhosiscare.ca to review valuable information about your liver disease, good dietary choices, exercise options, and symptom management strategies    Nutrition  - For patients with cirrhosis, it is very important to eat the right types and amounts of foods.  We recommend a diet low in carbohydrates/sugars and high in fresh fruits/vegetables, with the right amount of protein.  We typically recommend 1.5gm/kg/day of protein, or  grams of protein every day.  - In regard to protein intake, you can focus on: All meats, cooked fish and seafood, vegetable-based protein meat products, tofu, eggs, legumes, dairy products (including milk and yogurt and cheese), unsalted nuts, etc...  - You should eat at least three meals a day and three to four snacks between meals  - Bedtime snacks are especially important (preferrably something with some protein)    - Patients with malnutrition and/or loss of muscular mass can improve their nutrition and muscular mass by drinking two cans of dietary supplements daily, particularly at bedtime.  These would include: Ensure, Boost, [place] Instant Milk, Glucerna, or powdered whey protein (or similar supplements)  - Please avoid eating raw seafood, especially " shellfish, because of risk of serious illness  - We recommend all patients with cirrhosis limit their daily sodium intake to less than 2,000mg      General Liver Health  - Avoid the use of all non-steroid anti-inflammatory medicines (ibuprofen, Aleve, naproxen, aspirin, etc.) as this can cause serious injury to your kidneys in the setting of liver disease  - If you see a doctor and they prescribe you a new medication, please contact our clinic to let us know what changes are being made  - If you become acutely ill and present to an ER or are admitted to a hospital, please let us know as soon as you are able.  - Patients with chronic liver disease be vaccinated against hepatitis A and B.  Please discuss with your primary provider the need for these vaccines  - As patients with liver disease are at an increased risk of developing osteoporosis, we recommend that you have a DEXA scan with appropriate follow up and treatment.   - We recommend screening for Vitamin D deficiency (at least yearly) and aggressive replacement/supplementation as warranted.    Sleep Troubles:  - Sleep issues are very common in patients with chronic liver disease  - Recommend strict avoidance of medications such as narcotics, sedatives and sleep aids.    - Low dose benadryl or melatonin can be used for insomnia, if needed.    They offer rifaximin 550 mg tabs quantity 100 for $82.95 plus $9.99 for shipping. The patient has to set up an account with them using a credit card, once set up they give the patient the fax number to send the prescription.

## 2023-10-30 NOTE — NURSING NOTE
"Chief Complaint   Patient presents with    RECHECK     Follow up with cirrhosis     /63   Pulse 72   Ht 1.626 m (5' 4\")   Wt 101.2 kg (223 lb)   BMI 38.28 kg/m    Bisi Villarreal CMA on 10/30/2023 at 11:29 AM    "

## 2023-10-30 NOTE — LETTER
10/30/2023         RE: Marj Slater  [address] [facility] [place] [address]        Dear Colleague,    Thank you for referring your patient, Marj Slater, to the [facility] [place]. Please see a copy of my visit note below.    Date of Service: 10/30/2023     Subjective:            Marj Slater is a 63 year old female presenting for evaluation of liver disease    History of Present Illness   Marj Slater is a 63 year old female with past medical history of Alcohol use disorder in sustained remission, chronic hepatitis C s/p treatment with SVR with cirrhosis complicated by volume overload and HE who presents to establish care with a new provider.    Since April 2023 no major changes in health status     Notes that majority of days she lacks motivation to get out of home, get to gym/be more active.  She was previously treated with Zoloft and did see a therapist when she initially had decompensation in 2021, but not on current medications now engaged in mental health care at this time     She has not been engaged in a recovery program for some time, but states she would like to go back to in-person meetings    Reports that she has a bowel movement every other day, and if she goes longer she can correlate a change in her mental status.  She has a prescription for rifaximin, but due to enrollment in Medicare, out of pocket costs are > $1500/month    Past Medical History:  Past Medical History:   Diagnosis Date     Chronic hepatitis C (H)      History of blood transfusion      History of pneumonectomy        Surgical History:  Past Surgical History:   Procedure Laterality Date     COLONOSCOPY N/A 1/5/2023    Procedure: COLONOSCOPY, WITH POLYPECTOMY;  Surgeon: Michelle Perdue MD;  Location: [facility] OR       Social History:  Social History     Tobacco Use     Smoking status: Former     Packs/day: .25     Types: Cigarettes     Smokeless tobacco: Never     Tobacco comments:     2 cigs a  "week   Vaping Use     Vaping Use: Never used   Substance Use Topics     Alcohol use: Not Currently     Comment: Last ETOH use was 10/21     Drug use: Never       Family History:  Family History   Problem Relation Age of Onset     Breast Cancer Mother      Coronary Artery Disease Father        Medications:  Current Outpatient Medications   Medication     folic acid (FOLVITE) 1 MG tablet     magnesium oxide (MAG-OX) 400 MG tablet     multivitamin w/minerals (THERA-VIT-M) tablet     rifaximin (XIFAXAN) 550 MG TABS tablet     spironolactone (ALDACTONE) 100 MG tablet     traZODone (DESYREL) 50 MG tablet     docusate sodium (COLACE) 100 MG capsule     No current facility-administered medications for this visit.       Review of Systems  A complete 10 point review of systems was asked and answered in the negative unless specifically commented upon in the HPI    Objective:         Vitals:    10/30/23 1128   BP: 118/63   Pulse: 72   Weight: 101.2 kg (223 lb)   Height: 1.626 m (5' 4\")     Body mass index is 38.28 kg/m .     Physical Exam  Constitutional: Well-developed, well-nourished, in no apparent distress.    HEENT: Normocephalic. no scleral icterus.   Cardiac:  Regular rate  Respiratory: Normal Respiratory excursion  GI:  Abdomen soft, non distended, non-tender.   Skin:  Skin is warm and dry. no jaundice.  Musculoskeletal:  ROM intact, normal muscle bulk    Psychiatric: Normal mood and affect. Behavior is normal.  Neuro:  no asterixis     Labs and Diagnostic tests:  Lab Results   Component Value Date     10/30/2023    POTASSIUM 4.1 10/30/2023    POTASSIUM 4.0 07/21/2022    CHLORIDE 104 10/30/2023    CHLORIDE 103 07/21/2022    CO2 24 10/30/2023    CO2 26 07/21/2022    BUN 13.1 10/30/2023    BUN 19 07/21/2022    CR 0.93 10/30/2023     Lab Results   Component Value Date    BILITOTAL 2.2 10/30/2023    ALT 29 10/30/2023    AST 44 10/30/2023    ALKPHOS 101 10/30/2023     Lab Results   Component Value Date    ALBUMIN 3.2 " 10/30/2023    ALBUMIN 2.6 07/21/2022    PROTTOTAL 7.8 10/30/2023      Lab Results   Component Value Date    WBC 4.7 10/30/2023    HGB 12.7 10/30/2023    MCV 94 10/30/2023     10/30/2023     Lab Results   Component Value Date    INR 1.63 10/30/2023    INR 1.6 04/12/2022       MELD 3.0: 16 at 10/30/2023 10:48 AM  MELD-Na: 15 at 10/30/2023 10:48 AM  Calculated from:  Serum Creatinine: 0.93 mg/dL (Using min of 1 mg/dL) at 10/30/2023 10:48 AM  Serum Sodium: 137 mmol/L at 10/30/2023 10:48 AM  Total Bilirubin: 2.2 mg/dL at 10/30/2023 10:48 AM  Serum Albumin: 3.2 g/dL at 10/30/2023 10:48 AM  INR(ratio): 1.63 at 10/30/2023 10:48 AM  Age at listing (hypothetical): 63 years  Sex: Female at 10/30/2023 10:48 AM      Imaging:  Due today      Procedures:  EGD:   1/26/2021  - portal hypertensive gastropathy  - no varices    Colonoscopy:   1/5/2023  Findings:       Hemorrhoids were found on perianal exam.        Two sessile polyps were found in the ascending colon. The polyps were 2        to 3 mm in size. These polyps were removed with a jumbo cold forceps.        Resection and retrieval were complete. To prevent bleeding after the        polypectomy, two hemostatic clips were successfully placed. There was no        bleeding at the end of the procedure.        A 4 mm polyp was found in the descending colon. The polyp was sessile.        The polyp was removed with a cold snare. Resection and retrieval were        complete.        A 2 mm polyp was found in the descending colon. The polyp was sessile.        The polyp was removed with a jumbo cold forceps. Resection and retrieval        were complete.        Inflammed Internal hemorrhoids were found during retroflexion. The        hemorrhoids were large and Grade II (internal hemorrhoids that prolapse        but reduce spontaneously).     Assessment and Plan:    Cirrhosis:    - secondary to HCV and Alcohol  - sober > 2 years  - Treated HCV in 2021; SVR  - Has encephalopathy,  no significant issues with uncontrolled volume overload  - Repeat labs in 6 months    Liver Transplant Candidacy:   - Was evaluated in 2023 and declined due to clinical improvement  - Will re-consider as necessary    Alcohol Use Disorder  - Severe, in sustained remission  - Encouraged to re-engage with recovery program and mental health specialist    Hepatocellular Cancer Screening:   - Due for [facility] US today  - Recommend screening for HCC every 6 months with either abdominal ultrasound or by alternating abdominal ultrasound with EITHER a triple/quad phase CT Liver with IV contrast OR a Quad phase MRI Liver with IV contrast.  AFP levels should be checked every 6 months at time of imaging screen.    Ascites/Volume Overload:  - Continue on spironolactone 100mg daily  - stop lasix for now  - Continue to follow a sodium restricted (<2g sodium diet)     Hepatic Encephalopathy:  - start on miralax with goal 1-2 BMs daily  - Will refill Rx for rifaximin; can be filled if necessary if miralax doesn't work    Esophageal Varices:   - Last EGD 1/2021; will repeat in early 2024    -If evidence of medium/large esophageal varices, we recommend esophageal varix band ligation, and  if band ligation is performed, please continue to repeat every 4-8 weeks until eraditation of varices.  - If band ligation is deferred, we strongly recommend the initiation of a non-selective beta-blocker carvedilol and titration of this medication to a heart reate of 55BPM    Kidney Health:  - no overt issues    Immobility/Frailty:   - We discussed the imperative need to remain physically active and to participate in active exercise to maintain muscle mass and mobility    Nutrition:  As with most patients with chronic liver disease, there is a significant degree of protein malnutrition.    - Dicussed need to change dietary habits to improve overall protein balance.  Discussed the importance of eating between 1.2-1.5g/kg/day lean protein like eggs, fish,  poultry, nuts, legumes, and dairy - in addition to a diet rich in fresh fruits and vegetables.    - Continue to follow a sodium restricted (<2g sodium diet) and discussed the need to minimize the intake of carbohydrates and sugars, to avoid obesity.   - Strongly encourage protein supplements 2-3 times daily (Boost, Ensure, [place] Instant Milk, whey protein powder) to meet protein and caloric intake.  - Discussed important need to have a bedtime snack with protein to minimize risk of muscle breakdown    Routine Health Care in Patient with Chronic Liver Disease:  - We recommend screening for hepatitis A and B, please vaccinate if not immune  - All patients with liver disease, particularly those with cholestatic liver disease, are at an increased risk for osteoporosis.  We strongly recommend screening for Vitamin D deficiency at least twice yearly with aggressive supplementation/replacement as indicated.    - We also recommend a screening DEXA scan to evaluate for osteoporosis.  If present, should treat with calcium, Vitamin D supplementation, and recommend consideration of bisphosphonate therapy.  Also recommend follow up DEXA scans to evaluate for improvement of bone density on therapy.  - All patients with liver disease should avoid the use of Non-steroidal Anti-Inflammatory (NSAID) medications as they can cause significant injury to the kidneys in this population    Follow Up:  6 months     Thank you very much for the opportunity to participate in the care of this patient.  If you have any further questions, please don't hesitate to contact our office.    Thomas M. Leventhal, M.D.  Associate Professor of Medicine  Advanced & Transplant Hepatology  [facility]    I spent 40 minutes on the date of the encounter doing chart review, history and exam, documentation and further activities as noted above.     Again, thank you for allowing me to participate in the care of your patient.         Sincerely,        Thomas M. Leventhal, MD

## 2023-10-30 NOTE — PROGRESS NOTES
Date of Service: 10/30/2023     Subjective:            Marj Slater is a 63 year old female presenting for evaluation of liver disease    History of Present Illness   Marj Slater is a 63 year old female with past medical history of Alcohol use disorder in sustained remission, chronic hepatitis C s/p treatment with SVR with cirrhosis complicated by volume overload and HE who presents to establish care with a new provider.    Since April 2023 no major changes in health status     Notes that majority of days she lacks motivation to get out of home, get to gym/be more active.  She was previously treated with Zoloft and did see a therapist when she initially had decompensation in 2021, but not on current medications now engaged in mental health care at this time     She has not been engaged in a recovery program for some time, but states she would like to go back to in-person meetings    Reports that she has a bowel movement every other day, and if she goes longer she can correlate a change in her mental status.  She has a prescription for rifaximin, but due to enrollment in Medicare, out of pocket costs are > $1500/month    Past Medical History:  Past Medical History:   Diagnosis Date    Chronic hepatitis C (H)     History of blood transfusion     History of pneumonectomy        Surgical History:  Past Surgical History:   Procedure Laterality Date    COLONOSCOPY N/A 1/5/2023    Procedure: COLONOSCOPY, WITH POLYPECTOMY;  Surgeon: Michelle Perdue MD;  [place] [facility] [place]       Social History:  Social History     Tobacco Use    Smoking status: Former     Packs/day: .25     Types: Cigarettes    Smokeless tobacco: Never    Tobacco comments:     2 cigs a week   Vaping Use    Vaping Use: Never used   Substance Use Topics    Alcohol use: Not Currently     Comment: Last ETOH use was 10/21    Drug use: Never       Family History:  Family History   Problem Relation Age of Onset    Breast Cancer Mother     Coronary Artery  "Disease Father        Medications:  Current Outpatient Medications   Medication    folic acid (FOLVITE) 1 MG tablet    magnesium oxide (MAG-OX) 400 MG tablet    multivitamin w/minerals (THERA-VIT-M) tablet    rifaximin (XIFAXAN) 550 MG TABS tablet    spironolactone (ALDACTONE) 100 MG tablet    traZODone (DESYREL) 50 MG tablet    docusate sodium (COLACE) 100 MG capsule     No current facility-administered medications for this visit.       Review of Systems  A complete 10 point review of systems was asked and answered in the negative unless specifically commented upon in the HPI    Objective:         Vitals:    10/30/23 1128   BP: 118/63   Pulse: 72   Weight: 101.2 kg (223 lb)   Height: 1.626 m (5' 4\")     Body mass index is 38.28 kg/m .     Physical Exam  Constitutional: Well-developed, well-nourished, in no apparent distress.    HEENT: Normocephalic. no scleral icterus.   Cardiac:  Regular rate  Respiratory: Normal Respiratory excursion  GI:  Abdomen soft, non distended, non-tender.   Skin:  Skin is warm and dry. no jaundice.  Musculoskeletal:  ROM intact, normal muscle bulk    Psychiatric: Normal mood and affect. Behavior is normal.  Neuro:  no asterixis     Labs and Diagnostic tests:  Lab Results   Component Value Date     10/30/2023    POTASSIUM 4.1 10/30/2023    POTASSIUM 4.0 07/21/2022    CHLORIDE 104 10/30/2023    CHLORIDE 103 07/21/2022    CO2 24 10/30/2023    CO2 26 07/21/2022    BUN 13.1 10/30/2023    BUN 19 07/21/2022    CR 0.93 10/30/2023     Lab Results   Component Value Date    BILITOTAL 2.2 10/30/2023    ALT 29 10/30/2023    AST 44 10/30/2023    ALKPHOS 101 10/30/2023     Lab Results   Component Value Date    ALBUMIN 3.2 10/30/2023    ALBUMIN 2.6 07/21/2022    PROTTOTAL 7.8 10/30/2023      Lab Results   Component Value Date    WBC 4.7 10/30/2023    HGB 12.7 10/30/2023    MCV 94 10/30/2023     10/30/2023     Lab Results   Component Value Date    INR 1.63 10/30/2023    INR 1.6 04/12/2022 "       MELD 3.0: 16 at 10/30/2023 10:48 AM  MELD-Na: 15 at 10/30/2023 10:48 AM  Calculated from:  Serum Creatinine: 0.93 mg/dL (Using min of 1 mg/dL) at 10/30/2023 10:48 AM  Serum Sodium: 137 mmol/L at 10/30/2023 10:48 AM  Total Bilirubin: 2.2 mg/dL at 10/30/2023 10:48 AM  Serum Albumin: 3.2 g/dL at 10/30/2023 10:48 AM  INR(ratio): 1.63 at 10/30/2023 10:48 AM  Age at listing (hypothetical): 63 years  Sex: Female at 10/30/2023 10:48 AM      Imaging:  Due today      Procedures:  EGD:   1/26/2021  - portal hypertensive gastropathy  - no varices    Colonoscopy:   1/5/2023  Findings:       Hemorrhoids were found on perianal exam.        Two sessile polyps were found in the ascending colon. The polyps were 2        to 3 mm in size. These polyps were removed with a jumbo cold forceps.        Resection and retrieval were complete. To prevent bleeding after the        polypectomy, two hemostatic clips were successfully placed. There was no        bleeding at the end of the procedure.        A 4 mm polyp was found in the descending colon. The polyp was sessile.        The polyp was removed with a cold snare. Resection and retrieval were        complete.        A 2 mm polyp was found in the descending colon. The polyp was sessile.        The polyp was removed with a jumbo cold forceps. Resection and retrieval        were complete.        Inflammed Internal hemorrhoids were found during retroflexion. The        hemorrhoids were large and Grade II (internal hemorrhoids that prolapse        but reduce spontaneously).     Assessment and Plan:    Cirrhosis:    - secondary to HCV and Alcohol  - sober > 2 years  - Treated HCV in 2021; SVR  - Has encephalopathy, no significant issues with uncontrolled volume overload  - Repeat labs in 6 months    Liver Transplant Candidacy:   - Was evaluated in 2023 and declined due to clinical improvement  - Will re-consider as necessary    Alcohol Use Disorder  - Severe, in sustained remission  -  Encouraged to re-engage with recovery program and mental health specialist    Hepatocellular Cancer Screening:   - Due for Abd US today  - Recommend screening for HCC every 6 months with either abdominal ultrasound or by alternating abdominal ultrasound with EITHER a triple/quad phase CT Liver with IV contrast OR a Quad phase MRI Liver with IV contrast.  AFP levels should be checked every 6 months at time of imaging screen.    Ascites/Volume Overload:  - Continue on spironolactone 100mg daily  - stop lasix for now  - Continue to follow a sodium restricted (<2g sodium diet)     Hepatic Encephalopathy:  - start on miralax with goal 1-2 BMs daily  - Will refill Rx for rifaximin; can be filled if necessary if miralax doesn't work    Esophageal Varices:   - Last EGD 1/2021; will repeat in early 2024    -If evidence of medium/large esophageal varices, we recommend esophageal varix band ligation, and  if band ligation is performed, please continue to repeat every 4-8 weeks until eraditation of varices.  - If band ligation is deferred, we strongly recommend the initiation of a non-selective beta-blocker carvedilol and titration of this medication to a heart reate of 55BPM    Kidney Health:  - no overt issues    Immobility/Frailty:   - We discussed the imperative need to remain physically active and to participate in active exercise to maintain muscle mass and mobility    Nutrition:  As with most patients with chronic liver disease, there is a significant degree of protein malnutrition.    - Dicussed need to change dietary habits to improve overall protein balance.  Discussed the importance of eating between 1.2-1.5g/kg/day lean protein like eggs, fish, poultry, nuts, legumes, and dairy - in addition to a diet rich in fresh fruits and vegetables.    - Continue to follow a sodium restricted (<2g sodium diet) and discussed the need to minimize the intake of carbohydrates and sugars, to avoid obesity.   - Strongly encourage  protein supplements 2-3 times daily (Boost, Ensure, [place] Instant Milk, whey protein powder) to meet protein and caloric intake.  - Discussed important need to have a bedtime snack with protein to minimize risk of muscle breakdown    Routine Health Care in Patient with Chronic Liver Disease:  - We recommend screening for hepatitis A and B, please vaccinate if not immune  - All patients with liver disease, particularly those with cholestatic liver disease, are at an increased risk for osteoporosis.  We strongly recommend screening for Vitamin D deficiency at least twice yearly with aggressive supplementation/replacement as indicated.    - We also recommend a screening DEXA scan to evaluate for osteoporosis.  If present, should treat with calcium, Vitamin D supplementation, and recommend consideration of bisphosphonate therapy.  Also recommend follow up DEXA scans to evaluate for improvement of bone density on therapy.  - All patients with liver disease should avoid the use of Non-steroidal Anti-Inflammatory (NSAID) medications as they can cause significant injury to the kidneys in this population    Follow Up:  6 months     Thank you very much for the opportunity to participate in the care of this patient.  If you have any further questions, please don't hesitate to contact our office.    Thomas M. Leventhal, M.D.  Associate Professor of Medicine  Advanced & Transplant Hepatology  [facility]    I spent 40 minutes on the date of the encounter doing chart review, history and exam, documentation and further activities as noted above.

## 2023-11-01 LAB
LABORATORY REPORT: NORMAL
PLPETH BLD-MCNC: <10 NG/ML
POPETH BLD-MCNC: <10 NG/ML

## 2023-12-05 NOTE — ADDENDUM NOTE
Encounter addended by: Mj Barnhrat, PT on: 12/5/2023 10:05 AM   Actions taken: Episode resolved, Clinical Note Signed, Flowsheet accepted

## 2023-12-05 NOTE — PROGRESS NOTES
DISCHARGE  Reason for Discharge: Patient did not return to plan discharge      Referring Provider:  Trent Rush         04/19/23 1100   Appointment Info   Signing clinician's name / credentials Mj Barnhart, PT, DPT, CSCS   Visits Used 6/14   PT Tx Diagnosis Chronic fatigue secondary to chronic liver disease and global weakness   Progress Note/Certification   Onset of illness/injury or Date of Surgery 10/11/22   GOALS   PT Goals 2;1;3;4   PT Goal 1   Goal Identifier 1   Goal Description Patient will be independent with HEP in order to modulate symptoms and improve strength.   Target Date 03/14/23   PT Goal 2   Goal Identifier 2   Goal Description Patient will perform 5x sit to  12 seconds or less in order to demonstrate decreased falls risk.   Target Date 03/28/23   PT Goal 3   Goal Identifier 3   Goal Description Patient will tolerate walking 1 flight of stairs with step through patter in order to demonstrate improved function.   Target Date 05/02/23   PT Goal 4   Goal Identifier 4   Goal Description Patient will tolerate doing dished with no increase in low back pain in order to demonstrate improved impairments.   Target Date 05/02/23   Subjective Report   Subjective Report Patient reports her stamina has been improving. Balance is more challenging currently than her stamina.   Objective Measures   Objective Measures Objective Measure 1;Objective Measure 2;Objective Measure 3   Objective Measure 1   Objective Measure Gait   Details wide SARAH, no AD   Objective Measure 2   Objective Measure FGA   Details 13/30, indicating falls risk   Objective Measure 3   Objective Measure Balance   Details tandem stance x 8 sec with L foot posterior, x 13 sec with L foot anterior   Treatment Interventions (PT)   Interventions Therapeutic Procedure/Exercise;Neuromuscular Re-education;Physical Performance Test/Measures   Therapeutic Procedure/Exercise   Therapeutic Procedures: strength, endurance, ROM, flexibillity  "minutes (29190) 25   Skilled Intervention Progressive hip and LE strengthening   Patient Response/Progress Seated rest breaks needed   Neuromuscular Re-education   Neuromuscular re-ed of mvmt, balance, coord, kinesthetic sense, posture, proprioception minutes (39440) 15   Skilled Intervention Balance/proprioception;   Patient Response/Progress no significant LOB; patient was most challenged with side to side head rotations; difficulty with backwards walk   Treatment Detail Over and back stepping w/ 1/2 foam roller 2x10 each (verbal promote weight acceptance on stepping foot); S/L stance alternating toe tap on step 8\" 2x16 (verbal cues to use railing for balance only if needed); Straight line heel toe walking 3x10 each   Education   Learner/Method Patient   Plan   Home program PTRx [id number]   Updates to plan of care Added balance to HEP   Plan for next session Continue Nustep, functional/UE strengthening and activity tolerance and balance. Check HEP compliance   Comments   Comments Patient reports he stamina has made good improvements but she is still challenged by balance. She is concerned that her bifocals are  Contributing to her unsteadiness. Patient was encouraged to follow up with her optometrist. LE and core strengthening was tolerated well. Balance was appropriately challenging. Especially heel toe walking. Continue to progress strength and balance to improve function.   Medicare Claim Information   Medical Diagnosis M62.81 (ICD-10-CM) - Generalized muscle weakness  R53.81 (ICD-10-CM) - Physical deconditioning       "

## 2024-03-02 ENCOUNTER — HEALTH MAINTENANCE LETTER (OUTPATIENT)
Age: 64
End: 2024-03-02

## 2024-04-18 DIAGNOSIS — K70.31 ALCOHOLIC CIRRHOSIS OF LIVER WITH ASCITES (H): Primary | ICD-10-CM

## 2024-05-02 ENCOUNTER — LAB (OUTPATIENT)
Dept: LAB | Facility: CLINIC | Age: 64
End: 2024-05-02
Attending: INTERNAL MEDICINE
Payer: COMMERCIAL

## 2024-05-02 ENCOUNTER — OFFICE VISIT (OUTPATIENT)
Dept: GASTROENTEROLOGY | Facility: CLINIC | Age: 64
End: 2024-05-02
Attending: INTERNAL MEDICINE
Payer: COMMERCIAL

## 2024-05-02 VITALS
HEART RATE: 68 BPM | TEMPERATURE: 98.2 F | SYSTOLIC BLOOD PRESSURE: 113 MMHG | WEIGHT: 232.5 LBS | OXYGEN SATURATION: 92 % | BODY MASS INDEX: 39.91 KG/M2 | DIASTOLIC BLOOD PRESSURE: 74 MMHG

## 2024-05-02 DIAGNOSIS — F10.21 ALCOHOL DEPENDENCE, IN REMISSION (H): ICD-10-CM

## 2024-05-02 DIAGNOSIS — K70.31 ALCOHOLIC CIRRHOSIS OF LIVER WITH ASCITES (H): Primary | ICD-10-CM

## 2024-05-02 DIAGNOSIS — K70.31 ALCOHOLIC CIRRHOSIS OF LIVER WITH ASCITES (H): ICD-10-CM

## 2024-05-02 DIAGNOSIS — E44.1 MILD PROTEIN-CALORIE MALNUTRITION (H): ICD-10-CM

## 2024-05-02 DIAGNOSIS — I85.10 SECONDARY ESOPHAGEAL VARICES WITHOUT BLEEDING (H): ICD-10-CM

## 2024-05-02 DIAGNOSIS — G47.9 SLEEPING DIFFICULTY: ICD-10-CM

## 2024-05-02 LAB
ALBUMIN SERPL BCG-MCNC: 3.2 G/DL (ref 3.5–5.2)
ALP SERPL-CCNC: 107 U/L (ref 40–150)
ALT SERPL W P-5'-P-CCNC: 31 U/L (ref 0–50)
ANION GAP SERPL CALCULATED.3IONS-SCNC: 8 MMOL/L (ref 7–15)
AST SERPL W P-5'-P-CCNC: 47 U/L (ref 0–45)
BILIRUB DIRECT SERPL-MCNC: 0.57 MG/DL (ref 0–0.3)
BILIRUB SERPL-MCNC: 1.8 MG/DL
BUN SERPL-MCNC: 12.9 MG/DL (ref 8–23)
CALCIUM SERPL-MCNC: 9.1 MG/DL (ref 8.8–10.2)
CHLORIDE SERPL-SCNC: 106 MMOL/L (ref 98–107)
CREAT SERPL-MCNC: 0.89 MG/DL (ref 0.51–0.95)
DEPRECATED HCO3 PLAS-SCNC: 23 MMOL/L (ref 22–29)
EGFRCR SERPLBLD CKD-EPI 2021: 72 ML/MIN/1.73M2
ERYTHROCYTE [DISTWIDTH] IN BLOOD BY AUTOMATED COUNT: 13.9 % (ref 10–15)
GLUCOSE SERPL-MCNC: 114 MG/DL (ref 70–99)
HCT VFR BLD AUTO: 35.6 % (ref 35–47)
HGB BLD-MCNC: 12.1 G/DL (ref 11.7–15.7)
INR PPP: 1.5 (ref 0.85–1.15)
MCH RBC QN AUTO: 32.3 PG (ref 26.5–33)
MCHC RBC AUTO-ENTMCNC: 34 G/DL (ref 31.5–36.5)
MCV RBC AUTO: 95 FL (ref 78–100)
PLATELET # BLD AUTO: 139 10E3/UL (ref 150–450)
POTASSIUM SERPL-SCNC: 4.3 MMOL/L (ref 3.4–5.3)
PROT SERPL-MCNC: 7.6 G/DL (ref 6.4–8.3)
RBC # BLD AUTO: 3.75 10E6/UL (ref 3.8–5.2)
SODIUM SERPL-SCNC: 137 MMOL/L (ref 135–145)
WBC # BLD AUTO: 4.3 10E3/UL (ref 4–11)

## 2024-05-02 PROCEDURE — 99215 OFFICE O/P EST HI 40 MIN: CPT | Performed by: INTERNAL MEDICINE

## 2024-05-02 PROCEDURE — G0463 HOSPITAL OUTPT CLINIC VISIT: HCPCS | Performed by: INTERNAL MEDICINE

## 2024-05-02 PROCEDURE — 85610 PROTHROMBIN TIME: CPT | Performed by: PATHOLOGY

## 2024-05-02 PROCEDURE — 99000 SPECIMEN HANDLING OFFICE-LAB: CPT | Performed by: PATHOLOGY

## 2024-05-02 PROCEDURE — 36415 COLL VENOUS BLD VENIPUNCTURE: CPT | Performed by: PATHOLOGY

## 2024-05-02 PROCEDURE — 82248 BILIRUBIN DIRECT: CPT | Performed by: PATHOLOGY

## 2024-05-02 PROCEDURE — G2211 COMPLEX E/M VISIT ADD ON: HCPCS | Performed by: INTERNAL MEDICINE

## 2024-05-02 PROCEDURE — 80053 COMPREHEN METABOLIC PANEL: CPT | Performed by: PATHOLOGY

## 2024-05-02 PROCEDURE — 85027 COMPLETE CBC AUTOMATED: CPT | Performed by: PATHOLOGY

## 2024-05-02 PROCEDURE — 82105 ALPHA-FETOPROTEIN SERUM: CPT | Performed by: INTERNAL MEDICINE

## 2024-05-02 RX ORDER — TRAZODONE HYDROCHLORIDE 50 MG/1
50 TABLET, FILM COATED ORAL
Qty: 30 TABLET | Refills: 0 | Status: SHIPPED | OUTPATIENT
Start: 2024-05-02 | End: 2024-06-07

## 2024-05-02 RX ORDER — FUROSEMIDE 20 MG
20 TABLET ORAL DAILY
Qty: 90 TABLET | Refills: 3 | Status: SHIPPED | OUTPATIENT
Start: 2024-05-02

## 2024-05-02 ASSESSMENT — PAIN SCALES - GENERAL: PAINLEVEL: NO PAIN (0)

## 2024-05-02 NOTE — PATIENT INSTRUCTIONS
- For sleep: consider trying Magnesium glyccinate (take before bed)    - We will add in an additional diuretic called lasix to take once daily    - We need to get an ultrasound    - we will order another endoscopy to evaluate for varicose    Cirrhosis Education    We recommend you go to the website: [email or web address] to review valuable information about your liver disease, good dietary choices, exercise options, and symptom management strategies    Nutrition  - For patients with cirrhosis, it is very important to eat the right types and amounts of foods.  We recommend a diet low in carbohydrates/sugars and high in fresh fruits/vegetables, with the right amount of protein.  We typically recommend 1.5gm/kg/day of protein, or  grams of protein every day.  - In regard to protein intake, you can focus on: All meats, cooked fish and seafood, vegetable-based protein meat products, tofu, eggs, legumes, dairy products (including milk and yogurt and cheese), unsalted nuts, etc...  - You should eat at least three meals a day and three to four snacks between meals  - Bedtime snacks are especially important (preferrably something with some protein)    - Patients with malnutrition and/or loss of muscular mass can improve their nutrition and muscular mass by drinking two cans of dietary supplements daily, particularly at bedtime.  These would include: Ensure, Boost, [place] Instant Milk, Glucerna, or powdered whey protein (or similar supplements)  - Please avoid eating raw seafood, especially shellfish, because of risk of serious illness  - We recommend all patients with cirrhosis limit their daily sodium intake to less than 2,000mg      General Liver Health  - Avoid the use of all non-steroid anti-inflammatory medicines (ibuprofen, Aleve, naproxen, aspirin, etc.) as this can cause serious injury to your kidneys in the setting of liver disease  - If you see a doctor and they prescribe you a new medication, please  contact our clinic to let us know what changes are being made  - If you become acutely ill and present to an ER or are admitted to a hospital, please let us know as soon as you are able.  - Patients with chronic liver disease be vaccinated against hepatitis A and B.  Please discuss with your primary provider the need for these vaccines  - As patients with liver disease are at an increased risk of developing osteoporosis, we recommend that you have a DEXA scan with appropriate follow up and treatment.   - We recommend screening for Vitamin D deficiency (at least yearly) and aggressive replacement/supplementation as warranted.    Sleep Troubles:  - Sleep issues are very common in patients with chronic liver disease  - Recommend strict avoidance of medications such as narcotics, sedatives and sleep aids.    - Low dose benadryl or melatonin can be used for insomnia, if needed.

## 2024-05-02 NOTE — NURSING NOTE
Chief Complaint   Patient presents with    RECHECK     RETURN LIVER - six month follow up         /74 (BP Location: Right arm, Patient Position: Sitting, Cuff Size: Adult Large)   Pulse 68   Temp 98.2  F (36.8  C) (Oral)   Wt 105.5 kg (232 lb 8 oz)   SpO2 92%   BMI 39.91 kg/m      Jovan Hooks CMA on 5/2/2024 at 10:11 AM

## 2024-05-02 NOTE — LETTER
5/2/2024         RE: Marj Slater  [address] [facility] [place] [address]        Dear Colleague,    Thank you for referring your patient, Marj Slater, to the [facility] [place]. Please see a copy of my visit note below.    Date of Service: May 2, 2024     Subjective:            Marj Slater is a 63 year old female presenting for evaluation of liver disease    History of Present Illness   Marj Slater is a 63 year old female with past medical history of Alcohol use disorder in sustained remission, chronic hepatitis C s/p treatment with SVR with cirrhosis complicated by volume overload and HE who presents in follow up.    The she denies any overt major changes to her health since last seen in our clinic.  Notes that she has maintained relatively sedentary lifestyle, she finds herself very sensitive to the cold.  In the setting of this, she is noted to have gained approximately 30 pounds over the last 2 years.    She denies any overt issues with impaired memory or concentration    She does report that she gets significant swelling in her lower extremities several times per month.  She has been on spironolactone 100 mg daily.  She does note that the swelling is worse if she is noncompliant with her low-sodium diet.    She is due now for abdominal ultrasound    She continues to use trazodone as needed to help in sleep    Past Medical History:  Past Medical History:   Diagnosis Date     Chronic hepatitis C (H)      History of blood transfusion      History of pneumonectomy    Alcohol-related cirrhosis  Alcohol use disorder, in remission    Surgical History:  Past Surgical History:   Procedure Laterality Date     COLONOSCOPY N/A 1/5/2023    Procedure: COLONOSCOPY, WITH POLYPECTOMY;  Surgeon: Michelle Perdue MD;  Location: [facility] OR       Social History:  Social History     Tobacco Use     Smoking status: Some Days     Current packs/day: 0.25     Types: Cigarettes     Smokeless  tobacco: Never     Tobacco comments:     2 cigs a week. Smoke a quarter of a pack a week.    Vaping Use     Vaping status: Never Used   Substance Use Topics     Alcohol use: Not Currently     Comment: Last ETOH use was 10/21     Drug use: Never       Family History:  Family History   Problem Relation Age of Onset     Breast Cancer Mother      Coronary Artery Disease Father        Medications:  Current Outpatient Medications   Medication Sig Dispense Refill     furosemide (LASIX) 20 MG tablet Take 1 tablet (20 mg) by mouth daily 90 tablet 3     magnesium oxide (MAG-OX) 400 MG tablet Take 1 tablet (400 mg) by mouth 2 times daily (Patient taking differently: Take 400 mg by mouth daily) 90 tablet 3     multivitamin w/minerals (THERA-VIT-M) tablet Take 1 tablet by mouth daily 90 tablet 3     spironolactone (ALDACTONE) 100 MG tablet Take 1 tablet (100 mg) by mouth daily 90 tablet 3     docusate sodium (COLACE) 100 MG capsule Take 100 mg by mouth 2 times daily (Patient not taking: Reported on 10/30/2023)       folic acid (FOLVITE) 1 MG tablet  (Patient not taking: Reported on 5/2/2024)       rifaximin (XIFAXAN) 550 MG TABS tablet Take 1 tablet (550 mg) by mouth 2 times daily (Patient not taking: Reported on 5/2/2024) 60 tablet 11     traZODone (DESYREL) 50 MG tablet Take 1 tablet (50 mg) by mouth nightly as needed for sleep 30 tablet 0     No current facility-administered medications for this visit.       Review of Systems  A complete 10 point review of systems was asked and answered in the negative unless specifically commented upon in the HPI    Objective:         Vitals:    05/02/24 1006   BP: 113/74   BP Location: Right arm   Patient Position: Sitting   Cuff Size: Adult Large   Pulse: 68   Temp: 98.2  F (36.8  C)   TempSrc: Oral   SpO2: 92%   Weight: 105.5 kg (232 lb 8 oz)     Body mass index is 39.91 kg/m .     Physical Exam  Constitutional: Well-developed, well-nourished, in no apparent distress.    HEENT:  Normocephalic. no scleral icterus.   Cardiac:  Regular rate  Respiratory: Normal Respiratory excursion  GI:  Abdomen soft, non distended, non-tender.   Skin:  Skin is warm and dry. no jaundice.  Musculoskeletal:  ROM intact, normal muscle bulk    Psychiatric: Normal mood and affect. Behavior is normal.  Neuro:  no asterixis     Labs and Diagnostic tests:    MELD 3.0: 14 at 5/2/2024  9:11 AM  MELD-Na: 13 at 5/2/2024  9:11 AM  Calculated from:  Serum Creatinine: 0.89 mg/dL (Using min of 1 mg/dL) at 5/2/2024  9:11 AM  Serum Sodium: 137 mmol/L at 5/2/2024  9:11 AM  Total Bilirubin: 1.8 mg/dL at 5/2/2024  9:11 AM  Serum Albumin: 3.2 g/dL at 5/2/2024  9:11 AM  INR(ratio): 1.50 at 5/2/2024  9:11 AM  Age at listing (hypothetical): 63 years  Sex: Female at 5/2/2024  9:11 AM      Imaging:  Reviewed      Procedures:  EGD:   1/26/2021  - portal hypertensive gastropathy  - no varices    Colonoscopy:   1/5/2023  Findings:       Hemorrhoids were found on perianal exam.        Two sessile polyps were found in the ascending colon. The polyps were 2        to 3 mm in size. These polyps were removed with a jumbo cold forceps.        Resection and retrieval were complete. To prevent bleeding after the        polypectomy, two hemostatic clips were successfully placed. There was no        bleeding at the end of the procedure.        A 4 mm polyp was found in the descending colon. The polyp was sessile.        The polyp was removed with a cold snare. Resection and retrieval were        complete.        A 2 mm polyp was found in the descending colon. The polyp was sessile.        The polyp was removed with a jumbo cold forceps. Resection and retrieval        were complete.        Inflammed Internal hemorrhoids were found during retroflexion. The        hemorrhoids were large and Grade II (internal hemorrhoids that prolapse        but reduce spontaneously).     Assessment and Plan:    Cirrhosis:    - secondary to HCV and Alcohol  - sober >  2 years  - Treated HCV in 2021; SVR  -No overt encephalopathy at this time, does have issues with volume overload  - Repeat labs in 6 months    Liver Transplant Candidacy:   - Was evaluated in 2023 and declined due to clinical improvement  - Will re-consider as necessary    Alcohol Use Disorder  - Severe, in sustained remission  - Encouraged to re-engage with recovery program and mental health specialist    Hepatocellular Cancer Screening:   - Due for Abd US today  - Recommend screening for HCC every 6 months with either abdominal ultrasound or by alternating abdominal ultrasound with EITHER a triple/quad phase CT Liver with IV contrast OR a Quad phase MRI Liver with IV contrast.  AFP levels should be checked every 6 months at time of imaging screen.    Ascites/Volume Overload:  - Continue on spironolactone 100mg daily  -Given some progression of her symptoms we will start furosemide 20 mg daily  - stop lasix for now  - Continue to follow a sodium restricted (<2g sodium diet)     Hepatic Encephalopathy:  - start on miralax with goal 1-2 BMs daily  - Will refill Rx for rifaximin; can be filled if necessary if miralax doesn't work    Esophageal Varices:   - Last EGD 1/2021; will repeat in now; ordered    -If evidence of medium/large esophageal varices, we recommend esophageal varix band ligation, and  if band ligation is performed, please continue to repeat every 4-8 weeks until eraditation of varices.  - If band ligation is deferred, we strongly recommend the initiation of a non-selective beta-blocker carvedilol    Kidney Health:  - no overt issues    Immobility/Frailty:   - We discussed the imperative need to remain physically active and to participate in active exercise to maintain muscle mass and mobility    Nutrition:  As with most patients with chronic liver disease, there is a significant degree of protein malnutrition.    - Dicussed need to change dietary habits to improve overall protein balance.  Discussed  the importance of eating between 1.2-1.5g/kg/day lean protein like eggs, fish, poultry, nuts, legumes, and dairy - in addition to a diet rich in fresh fruits and vegetables.    - Continue to follow a sodium restricted (<2g sodium diet) and discussed the need to minimize the intake of carbohydrates and sugars, to avoid obesity.   - Strongly encourage protein supplements 2-3 times daily (Boost, Ensure, [place] Instant Milk, whey protein powder) to meet protein and caloric intake.  - Discussed important need to have a bedtime snack with protein to minimize risk of muscle breakdown    Routine Health Care in Patient with Chronic Liver Disease:  - We recommend screening for hepatitis A and B, please vaccinate if not immune  - All patients with liver disease, particularly those with cholestatic liver disease, are at an increased risk for osteoporosis.  We strongly recommend screening for Vitamin D deficiency at least twice yearly with aggressive supplementation/replacement as indicated.    - We also recommend a screening DEXA scan to evaluate for osteoporosis.  If present, should treat with calcium, Vitamin D supplementation, and recommend consideration of bisphosphonate therapy.  Also recommend follow up DEXA scans to evaluate for improvement of bone density on therapy.  - All patients with liver disease should avoid the use of Non-steroidal Anti-Inflammatory (NSAID) medications as they can cause significant injury to the kidneys in this population    Follow Up:  6 months     Thank you very much for the opportunity to participate in the care of this patient.  If you have any further questions, please don't hesitate to contact our office.    Thomas M. Leventhal, M.D.  Associate Professor of Medicine  Advanced & Transplant Hepatology  [facility]    I spent 40 minutes on the date of the encounter doing chart review, history and exam, documentation and further activities as noted above.       The longitudinal plan of care for cirrhosis (and possible complications) was addressed during this visit. Due to the added complexity in care, I will continue to support this patient in the subsequent management of this condition(s) and with the ongoing continuity of care of this condition

## 2024-05-03 LAB — AFP SERPL-MCNC: 3.6 NG/ML

## 2024-05-13 DIAGNOSIS — K70.31 ALCOHOLIC CIRRHOSIS OF LIVER WITH ASCITES (H): ICD-10-CM

## 2024-05-13 RX ORDER — MULTIPLE VITAMINS W/ MINERALS TAB 9MG-400MCG
1 TAB ORAL DAILY
Qty: 90 TABLET | Refills: 3 | Status: SHIPPED | OUTPATIENT
Start: 2024-05-13

## 2024-06-07 DIAGNOSIS — G47.9 SLEEPING DIFFICULTY: ICD-10-CM

## 2024-06-07 RX ORDER — TRAZODONE HYDROCHLORIDE 50 MG/1
50 TABLET, FILM COATED ORAL
Qty: 30 TABLET | Refills: 3 | Status: SHIPPED | OUTPATIENT
Start: 2024-06-07

## 2024-07-03 DIAGNOSIS — K70.31 ALCOHOLIC CIRRHOSIS OF LIVER WITH ASCITES (H): ICD-10-CM

## 2024-07-03 RX ORDER — MAGNESIUM OXIDE 400 MG/1
400 TABLET ORAL 2 TIMES DAILY
Qty: 180 TABLET | Refills: 3 | Status: SHIPPED | OUTPATIENT
Start: 2024-07-03

## 2024-11-19 DIAGNOSIS — K70.31 ALCOHOLIC CIRRHOSIS OF LIVER WITH ASCITES (H): Primary | ICD-10-CM

## 2024-11-27 ENCOUNTER — TELEPHONE (OUTPATIENT)
Dept: GASTROENTEROLOGY | Facility: CLINIC | Age: 64
End: 2024-11-27
Payer: COMMERCIAL

## 2024-11-27 NOTE — TELEPHONE ENCOUNTER
Was the patient contacted by phone and reminded of the upcoming visit? message left    Was the patient instructed to bring a current list of all medications to the appointment or instructed to bring in all medication bottles? Yes     Were ordered labs and tests completed prior to the appointment? Writer scheduled ultrasound at 12:30 same day of visit, message left reminding of 12 hour fast, also gave option to schedule ultrasound prior at her local [place] if this time does not work for her.     Was the patient instructed to arrive prior to the appointment time to have ordered labs drawn? Yes     Were the needed lab orders placed? Yes    Was lab appointment made 1 hour or more prior to visit? Yes    Enedelia Chino CMA  11/27/2024 8:38 AM

## 2024-12-05 ENCOUNTER — OFFICE VISIT (OUTPATIENT)
Dept: GASTROENTEROLOGY | Facility: CLINIC | Age: 64
End: 2024-12-05
Attending: INTERNAL MEDICINE
Payer: COMMERCIAL

## 2024-12-05 ENCOUNTER — ANCILLARY PROCEDURE (OUTPATIENT)
Dept: ULTRASOUND IMAGING | Facility: CLINIC | Age: 64
End: 2024-12-05
Attending: INTERNAL MEDICINE
Payer: COMMERCIAL

## 2024-12-05 ENCOUNTER — LAB (OUTPATIENT)
Dept: LAB | Facility: CLINIC | Age: 64
End: 2024-12-05
Attending: INTERNAL MEDICINE
Payer: COMMERCIAL

## 2024-12-05 VITALS — WEIGHT: 242 LBS | BODY MASS INDEX: 41.54 KG/M2 | DIASTOLIC BLOOD PRESSURE: 71 MMHG | SYSTOLIC BLOOD PRESSURE: 109 MMHG

## 2024-12-05 DIAGNOSIS — N17.9 AKI (ACUTE KIDNEY INJURY) (H): Primary | ICD-10-CM

## 2024-12-05 DIAGNOSIS — E44.1 MILD PROTEIN-CALORIE MALNUTRITION (H): ICD-10-CM

## 2024-12-05 DIAGNOSIS — K70.31 ALCOHOLIC CIRRHOSIS OF LIVER WITH ASCITES (H): ICD-10-CM

## 2024-12-05 DIAGNOSIS — I85.10 SECONDARY ESOPHAGEAL VARICES WITHOUT BLEEDING (H): ICD-10-CM

## 2024-12-05 LAB
AFP SERPL-MCNC: 3.5 NG/ML
ALBUMIN SERPL BCG-MCNC: 3.5 G/DL (ref 3.5–5.2)
ALP SERPL-CCNC: 85 U/L (ref 40–150)
ALT SERPL W P-5'-P-CCNC: 28 U/L (ref 0–50)
ANION GAP SERPL CALCULATED.3IONS-SCNC: 9 MMOL/L (ref 7–15)
AST SERPL W P-5'-P-CCNC: 36 U/L (ref 0–45)
BILIRUB DIRECT SERPL-MCNC: 0.41 MG/DL (ref 0–0.3)
BILIRUB SERPL-MCNC: 1.4 MG/DL
BUN SERPL-MCNC: 19.9 MG/DL (ref 8–23)
CALCIUM SERPL-MCNC: 9.4 MG/DL (ref 8.8–10.4)
CHLORIDE SERPL-SCNC: 105 MMOL/L (ref 98–107)
CREAT SERPL-MCNC: 1.07 MG/DL (ref 0.51–0.95)
EGFRCR SERPLBLD CKD-EPI 2021: 57 ML/MIN/1.73M2
ERYTHROCYTE [DISTWIDTH] IN BLOOD BY AUTOMATED COUNT: 12.9 % (ref 10–15)
GLUCOSE SERPL-MCNC: 104 MG/DL (ref 70–99)
HCO3 SERPL-SCNC: 25 MMOL/L (ref 22–29)
HCT VFR BLD AUTO: 36 % (ref 35–47)
HGB BLD-MCNC: 12.4 G/DL (ref 11.7–15.7)
INR PPP: 1.27 (ref 0.85–1.15)
MCH RBC QN AUTO: 32.3 PG (ref 26.5–33)
MCHC RBC AUTO-ENTMCNC: 34.4 G/DL (ref 31.5–36.5)
MCV RBC AUTO: 94 FL (ref 78–100)
PLATELET # BLD AUTO: 123 10E3/UL (ref 150–450)
POTASSIUM SERPL-SCNC: 4.3 MMOL/L (ref 3.4–5.3)
PROT SERPL-MCNC: 7.7 G/DL (ref 6.4–8.3)
RBC # BLD AUTO: 3.84 10E6/UL (ref 3.8–5.2)
SODIUM SERPL-SCNC: 139 MMOL/L (ref 135–145)
WBC # BLD AUTO: 3.6 10E3/UL (ref 4–11)

## 2024-12-05 PROCEDURE — 82105 ALPHA-FETOPROTEIN SERUM: CPT | Performed by: INTERNAL MEDICINE

## 2024-12-05 PROCEDURE — 76700 US EXAM ABDOM COMPLETE: CPT | Performed by: RADIOLOGY

## 2024-12-05 PROCEDURE — G0463 HOSPITAL OUTPT CLINIC VISIT: HCPCS | Performed by: INTERNAL MEDICINE

## 2024-12-05 PROCEDURE — 82248 BILIRUBIN DIRECT: CPT | Performed by: PATHOLOGY

## 2024-12-05 PROCEDURE — 85027 COMPLETE CBC AUTOMATED: CPT | Performed by: PATHOLOGY

## 2024-12-05 PROCEDURE — 36415 COLL VENOUS BLD VENIPUNCTURE: CPT | Performed by: PATHOLOGY

## 2024-12-05 PROCEDURE — 85610 PROTHROMBIN TIME: CPT | Performed by: PATHOLOGY

## 2024-12-05 PROCEDURE — 99000 SPECIMEN HANDLING OFFICE-LAB: CPT | Performed by: PATHOLOGY

## 2024-12-05 PROCEDURE — 80053 COMPREHEN METABOLIC PANEL: CPT | Performed by: PATHOLOGY

## 2024-12-05 ASSESSMENT — PAIN SCALES - GENERAL: PAINLEVEL_OUTOF10: NO PAIN (0)

## 2024-12-05 NOTE — LETTER
12/5/2024      Marj Slater  [address] [facility] [address]      Dear Colleague,    Thank you for referring your patient, Marj Slater, to the [facility] [place]. Please see a copy of my visit note below.    Date of Service: December 5, 2024     Subjective:            Marj Slater is a 64 year old female presenting for evaluation of liver disease    History of Present Illness   Marj Slater is a 64 year old female with past medical history of alcohol use disorder in sustained remission, chronic hepatitis C s/p treatment with SVR with cirrhosis complicated by volume overload and HE who presents in follow up.    Since last seen she reports that she has been doing fairly well.  She has unfortunately had a further 10 pound weight gain, reaching 242 pounds on today's exam.  She notes she has been fairly sedentary in her lifestyle recently, she has concerns about the upcoming winter as she has fallen in the past on the ice and she still feels somewhat unsteady on her feet.  She is thinking about asking her primary care provider for handicap parking sticker.    She denies any overt changes in her memory or concentration.  Notes that her volume status is well-controlled on her current diuretic regimen.    She is currently on Lasix 20 mg daily and spironolactone 100 mg daily.    With regard to encephalopathy she continues on a regimen of MiraLAX with adequate effect.    The she denies any overt major changes to her health since last seen in our clinic.  Notes that she has maintained relatively sedentary lifestyle, she finds herself very sensitive to the cold.  In the setting of this, she is noted to have gained approximately 30 pounds over the last 2 years.    She denies any overt issues with impaired memory or concentration    EGD from July 2021 without evidence of varices    She denies any overt cravings for alcohol use.  Does admit to several cravings but strong desire to  avoid alcohol indefinitely.    Past Medical History:  Past Medical History:   Diagnosis Date     Chronic hepatitis C (H)      History of blood transfusion      History of pneumonectomy    Alcohol-related cirrhosis  Alcohol use disorder, in remission    Surgical History:  Past Surgical History:   Procedure Laterality Date     COLONOSCOPY N/A 1/5/2023    Procedure: COLONOSCOPY, WITH POLYPECTOMY;  Surgeon: Michelle Perdue MD;  [place] [facility] [place]       Social History:  Social History     Tobacco Use     Smoking status: Some Days     Current packs/day: 0.25     Types: Cigarettes     Smokeless tobacco: Never     Tobacco comments:     2 cigs a week. Smoke a quarter of a pack a week.    Vaping Use     Vaping status: Never Used   Substance Use Topics     Alcohol use: Not Currently     Comment: Last ETOH use was 10/21     Drug use: Never       Family History:  Family History   Problem Relation Age of Onset     Breast Cancer Mother      Coronary Artery Disease Father        Medications:  Current Outpatient Medications   Medication Sig Dispense Refill     multivitamin w/minerals (THERA-VIT-M) tablet Take 1 tablet by mouth daily 90 tablet 3     spironolactone (ALDACTONE) 100 MG tablet Take 1 tablet (100 mg) by mouth daily 90 tablet 3     traZODone (DESYREL) 50 MG tablet Take 1 tablet (50 mg) by mouth nightly as needed for sleep 30 tablet 3     docusate sodium (COLACE) 100 MG capsule Take 100 mg by mouth 2 times daily (Patient not taking: Reported on 12/5/2024)       folic acid (FOLVITE) 1 MG tablet  (Patient not taking: Reported on 12/5/2024)       furosemide (LASIX) 20 MG tablet Take 1 tablet (20 mg) by mouth daily 90 tablet 3     magnesium oxide (MAG-OX) 400 MG tablet Take 1 tablet (400 mg) by mouth 2 times daily (Patient not taking: Reported on 12/5/2024) 180 tablet 3     No current facility-administered medications for this visit.       Review of Systems  A complete 10 point review of systems was asked and answered in the  negative unless specifically commented upon in the HPI    Objective:         Vitals:    12/05/24 1103   BP: 109/71   Pulse: (!) 0   Weight: 109.8 kg (242 lb)     Body mass index is 41.54 kg/m .     Physical Exam  Constitutional: Well-developed, well-nourished, in no apparent distress.    HEENT: Normocephalic. no scleral icterus.   Cardiac:  Regular rate  Respiratory: Normal Respiratory excursion  GI:  Abdomen soft, non distended, non-tender.   Skin:  Skin is warm and dry. no jaundice.  Musculoskeletal:  ROM intact, normal muscle bulk    Psychiatric: Normal mood and affect. Behavior is normal.  Neuro:  no asterixis     Labs and Diagnostic tests:    MELD 3.0: 12 at 12/5/2024 10:12 AM  MELD-Na: 12 at 12/5/2024 10:12 AM  Calculated from:  Serum Creatinine: 1.07 mg/dL at 12/5/2024 10:12 AM  Serum Sodium: 139 mmol/L (Using max of 137 mmol/L) at 12/5/2024 10:12 AM  Total Bilirubin: 1.4 mg/dL at 12/5/2024 10:12 AM  Serum Albumin: 3.5 g/dL at 12/5/2024 10:12 AM  INR(ratio): 1.27 at 12/5/2024 10:12 AM  Age at listing (hypothetical): 64 years  Sex: Female at 12/5/2024 10:12 AM      Imaging:  Abdominal ultrasound from today pending      Procedures:  EGD:   1/26/2021  - portal hypertensive gastropathy  - no varices    Colonoscopy:   1/5/2023  Findings:       Hemorrhoids were found on perianal exam.        Two sessile polyps were found in the ascending colon. The polyps were 2        to 3 mm in size. These polyps were removed with a jumbo cold forceps.        Resection and retrieval were complete. To prevent bleeding after the        polypectomy, two hemostatic clips were successfully placed. There was no        bleeding at the end of the procedure.        A 4 mm polyp was found in the descending colon. The polyp was sessile.        The polyp was removed with a cold snare. Resection and retrieval were        complete.        A 2 mm polyp was found in the descending colon. The polyp was sessile.        The polyp was removed with a  joselito cold forceps. Resection and retrieval        were complete.        Inflammed Internal hemorrhoids were found during retroflexion. The        hemorrhoids were large and Grade II (internal hemorrhoids that prolapse        but reduce spontaneously).     Assessment and Plan:    Cirrhosis:    - secondary to HCV and Alcohol  - sober > 2 years  - Treated HCV in 2021; SVR  -No overt encephalopathy at this time, does have issues with volume overload  - Repeat labs in 6 months    Risk for MASLD:  Steatotic Liver Disease (SLD, formerly referred to as Non-alcoholic Fatty Liver Disease)  - I had a long discussion with the patient about metabolic dysfunction-associated steatotic liver disease (MASLD).  We discussed how fat deposits in the liver, how this leads to inflammation, and how chronic inflammation (metabolic dysfunction-associated steatohepatitis/MASH) can ultimately lead to scarring and cirrhosis.  Management of MASLD/MASH  - We spent time discussing necessary lifestyle modifications including: appropriate diet, exercise and weight loss plan.      - Recommend exercise regularly: at least 4 times per week, with a minimum of 40-45 minutes per day.      - It has shown that patients who exercise regularly can have improvement of insulin resistance, increase in baseline metabolic activity and resolution of fatty liver disease (even if  appreciable weight loss does not occur)    - Recommend a low-carbohydrate, low-calorie diet (~1700 calories per day).    - An ideal weight loss plan would be to lose 7-10% of body weight over the next six months.  This has been proven to resolve fat and inflammation in the liver, and can lead to regression of scarring that might be present  - If the patient has diabetes, we recommend assertive management: ideal goal Hgb A1c goal of =/< 7%.     - There are no overt contraindications to medications used in the treatment of diabetes in persons with chronic liver disease  - Management of  cholesterol is also very important.    - The use of statins are an effective means of therapy and are not contra-indicated in those with abnormal liver tests OR those with cirrhosis.  The value of these medications in this population far outweigh the minor risks of abnormal liver tests.   - Goal LDL in those with MASH are < 100 mg/dL  - Consider the utility of liberalizing coffee consumption as some data that this may slow progression and reverse effects of BONILLA-related fibrosis.  - We recommend a referral to the [facility]   - referral placed    Liver Transplant Candidacy:   - Was evaluated in 2023 and declined due to clinical improvement  - Will re-consider as necessary    Alcohol Use Disorder  - Severe, in sustained remission  - Encouraged to re-engage with recovery program and mental health specialist    Hepatocellular Cancer Screening:   - Due for Abd US today  - Recommend screening for HCC every 6 months with either abdominal ultrasound or by alternating abdominal ultrasound with EITHER a triple/quad phase CT Liver with IV contrast OR a Quad phase MRI Liver with IV contrast.  AFP levels should be checked every 6 months at time of imaging screen.    Ascites/Volume Overload:  - Continue on spironolactone 100mg daily and Lasix 20 mg daily    -Noted mild RYLEY on today's labs; will plan to repeat labs in 1 to 2 months  - Continue to follow a sodium restricted (<2g sodium diet)     Hepatic Encephalopathy:  -Continue on miralax with goal 1-2 BMs daily    Esophageal Varices:   - Last EGD 1/2021; will repeat in now; ordered    -If evidence of medium/large esophageal varices, we recommend esophageal varix band ligation, and  if band ligation is performed, please continue to repeat every 4-8 weeks until eraditation of varices.  - If band ligation is deferred, we strongly recommend the initiation of a non-selective beta-blocker carvedilol    Kidney Health:  -He has RYLEY on  today's labs.  May be related to weight gain since last lab check  -We will plan to repeat in approximately 3 months    Immobility/Frailty:   - We discussed the imperative need to remain physically active and to participate in active exercise to maintain muscle mass and mobility    Nutrition:  As with most patients with chronic liver disease, there is a significant degree of protein malnutrition.    - Dicussed need to change dietary habits to improve overall protein balance.  Discussed the importance of eating between 1.2-1.5g/kg/day lean protein like eggs, fish, poultry, nuts, legumes, and dairy - in addition to a diet rich in fresh fruits and vegetables.    - Continue to follow a sodium restricted (<2g sodium diet) and discussed the need to minimize the intake of carbohydrates and sugars, to avoid obesity.   - Strongly encourage protein supplements 2-3 times daily (Boost, Ensure, [place] Instant Milk, whey protein powder) to meet protein and caloric intake.  - Discussed important need to have a bedtime snack with protein to minimize risk of muscle breakdown    Routine Health Care in Patient with Chronic Liver Disease:  - We recommend screening for hepatitis A and B, please vaccinate if not immune  - All patients with liver disease, particularly those with cholestatic liver disease, are at an increased risk for osteoporosis.  We strongly recommend screening for Vitamin D deficiency at least twice yearly with aggressive supplementation/replacement as indicated.    - We also recommend a screening DEXA scan to evaluate for osteoporosis.  If present, should treat with calcium, Vitamin D supplementation, and recommend consideration of bisphosphonate therapy.  Also recommend follow up DEXA scans to evaluate for improvement of bone density on therapy.  - All patients with liver disease should avoid the use of Non-steroidal Anti-Inflammatory (NSAID) medications as they can cause significant injury to the kidneys in this  population    Follow Up:  6 months     Thank you very much for the opportunity to participate in the care of this patient.  If you have any further questions, please don't hesitate to contact our office.    Thomas M. Leventhal, M.D.  Associate Professor of Medicine  Advanced & Transplant Hepatology  [facility]    I spent 40 minutes on the date of the encounter doing chart review, history and exam, documentation and further activities as noted above.      The longitudinal plan of care for cirrhosis (and possible complications) was addressed during this visit. Due to the added complexity in care, I will continue to support this patient in the subsequent management of this condition(s) and with the ongoing continuity of care of this condition      Again, thank you for allowing me to participate in the care of your patient.        Sincerely,        Thomas M. Leventhal, MD

## 2024-12-05 NOTE — NURSING NOTE
"Chief Complaint   Patient presents with    RECHECK     6 month follow-up      Vital signs:      BP: 109/71 Pulse: (!) 0             Weight: 109.8 kg (242 lb)  Estimated body mass index is 41.54 kg/m  as calculated from the following:    Height as of 10/30/23: 1.626 m (5' 4\").    Weight as of this encounter: 109.8 kg (242 lb).      Carolyn Livingston CMA   12/5/2024 11:03 AM    "

## 2024-12-05 NOTE — PROGRESS NOTES
Date of Service: December 5, 2024     Subjective:            Marj Slater is a 64 year old female presenting for evaluation of liver disease    History of Present Illness   Marj Slater is a 64 year old female with past medical history of alcohol use disorder in sustained remission, chronic hepatitis C s/p treatment with SVR with cirrhosis complicated by volume overload and HE who presents in follow up.    Since last seen she reports that she has been doing fairly well.  She has unfortunately had a further 10 pound weight gain, reaching 242 pounds on today's exam.  She notes she has been fairly sedentary in her lifestyle recently, she has concerns about the upcoming winter as she has fallen in the past on the ice and she still feels somewhat unsteady on her feet.  She is thinking about asking her primary care provider for handicap parking sticker.    She denies any overt changes in her memory or concentration.  Notes that her volume status is well-controlled on her current diuretic regimen.    She is currently on Lasix 20 mg daily and spironolactone 100 mg daily.    With regard to encephalopathy she continues on a regimen of MiraLAX with adequate effect.    The she denies any overt major changes to her health since last seen in our clinic.  Notes that she has maintained relatively sedentary lifestyle, she finds herself very sensitive to the cold.  In the setting of this, she is noted to have gained approximately 30 pounds over the last 2 years.    She denies any overt issues with impaired memory or concentration    EGD from July 2021 without evidence of varices    She denies any overt cravings for alcohol use.  Does admit to several cravings but strong desire to avoid alcohol indefinitely.    Past Medical History:  Past Medical History:   Diagnosis Date    Chronic hepatitis C (H)     History of blood transfusion     History of pneumonectomy    Alcohol-related cirrhosis  Alcohol use disorder, in  remission    Surgical History:  Past Surgical History:   Procedure Laterality Date    COLONOSCOPY N/A 1/5/2023    Procedure: COLONOSCOPY, WITH POLYPECTOMY;  Surgeon: Michelle Perdue MD;  Location: [facility] OR       Social History:  Social History     Tobacco Use    Smoking status: Some Days     Current packs/day: 0.25     Types: Cigarettes    Smokeless tobacco: Never    Tobacco comments:     2 cigs a week. Smoke a quarter of a pack a week.    Vaping Use    Vaping status: Never Used   Substance Use Topics    Alcohol use: Not Currently     Comment: Last ETOH use was 10/21    Drug use: Never       Family History:  Family History   Problem Relation Age of Onset    Breast Cancer Mother     Coronary Artery Disease Father        Medications:  Current Outpatient Medications   Medication Sig Dispense Refill    multivitamin w/minerals (THERA-VIT-M) tablet Take 1 tablet by mouth daily 90 tablet 3    spironolactone (ALDACTONE) 100 MG tablet Take 1 tablet (100 mg) by mouth daily 90 tablet 3    traZODone (DESYREL) 50 MG tablet Take 1 tablet (50 mg) by mouth nightly as needed for sleep 30 tablet 3    docusate sodium (COLACE) 100 MG capsule Take 100 mg by mouth 2 times daily (Patient not taking: Reported on 12/5/2024)      folic acid (FOLVITE) 1 MG tablet  (Patient not taking: Reported on 12/5/2024)      furosemide (LASIX) 20 MG tablet Take 1 tablet (20 mg) by mouth daily 90 tablet 3    magnesium oxide (MAG-OX) 400 MG tablet Take 1 tablet (400 mg) by mouth 2 times daily (Patient not taking: Reported on 12/5/2024) 180 tablet 3     No current facility-administered medications for this visit.       Review of Systems  A complete 10 point review of systems was asked and answered in the negative unless specifically commented upon in the HPI    Objective:         Vitals:    12/05/24 1103   BP: 109/71   Pulse: (!) 0   Weight: 109.8 kg (242 lb)     Body mass index is 41.54 kg/m .     Physical Exam  Constitutional: Well-developed, well-nourished,  in no apparent distress.    HEENT: Normocephalic. no scleral icterus.   Cardiac:  Regular rate  Respiratory: Normal Respiratory excursion  GI:  Abdomen soft, non distended, non-tender.   Skin:  Skin is warm and dry. no jaundice.  Musculoskeletal:  ROM intact, normal muscle bulk    Psychiatric: Normal mood and affect. Behavior is normal.  Neuro:  no asterixis     Labs and Diagnostic tests:    MELD 3.0: 12 at 12/5/2024 10:12 AM  MELD-Na: 12 at 12/5/2024 10:12 AM  Calculated from:  Serum Creatinine: 1.07 mg/dL at 12/5/2024 10:12 AM  Serum Sodium: 139 mmol/L (Using max of 137 mmol/L) at 12/5/2024 10:12 AM  Total Bilirubin: 1.4 mg/dL at 12/5/2024 10:12 AM  Serum Albumin: 3.5 g/dL at 12/5/2024 10:12 AM  INR(ratio): 1.27 at 12/5/2024 10:12 AM  Age at listing (hypothetical): 64 years  Sex: Female at 12/5/2024 10:12 AM      Imaging:  Abdominal ultrasound from today pending      Procedures:  EGD:   1/26/2021  - portal hypertensive gastropathy  - no varices    Colonoscopy:   1/5/2023  Findings:       Hemorrhoids were found on perianal exam.        Two sessile polyps were found in the ascending colon. The polyps were 2        to 3 mm in size. These polyps were removed with a jumbo cold forceps.        Resection and retrieval were complete. To prevent bleeding after the        polypectomy, two hemostatic clips were successfully placed. There was no        bleeding at the end of the procedure.        A 4 mm polyp was found in the descending colon. The polyp was sessile.        The polyp was removed with a cold snare. Resection and retrieval were        complete.        A 2 mm polyp was found in the descending colon. The polyp was sessile.        The polyp was removed with a jumbo cold forceps. Resection and retrieval        were complete.        Inflammed Internal hemorrhoids were found during retroflexion. The        hemorrhoids were large and Grade II (internal hemorrhoids that prolapse        but reduce spontaneously).      Assessment and Plan:    Cirrhosis:    - secondary to HCV and Alcohol  - sober > 2 years  - Treated HCV in 2021; SVR  -No overt encephalopathy at this time, does have issues with volume overload  - Repeat labs in 6 months    Risk for MASLD:  Steatotic Liver Disease (SLD, formerly referred to as Non-alcoholic Fatty Liver Disease)  - I had a long discussion with the patient about metabolic dysfunction-associated steatotic liver disease (MASLD).  We discussed how fat deposits in the liver, how this leads to inflammation, and how chronic inflammation (metabolic dysfunction-associated steatohepatitis/MASH) can ultimately lead to scarring and cirrhosis.  Management of MASLD/MASH  - We spent time discussing necessary lifestyle modifications including: appropriate diet, exercise and weight loss plan.      - Recommend exercise regularly: at least 4 times per week, with a minimum of 40-45 minutes per day.      - It has shown that patients who exercise regularly can have improvement of insulin resistance, increase in baseline metabolic activity and resolution of fatty liver disease (even if  appreciable weight loss does not occur)    - Recommend a low-carbohydrate, low-calorie diet (~1700 calories per day).    - An ideal weight loss plan would be to lose 7-10% of body weight over the next six months.  This has been proven to resolve fat and inflammation in the liver, and can lead to regression of scarring that might be present  - If the patient has diabetes, we recommend assertive management: ideal goal Hgb A1c goal of =/< 7%.     - There are no overt contraindications to medications used in the treatment of diabetes in persons with chronic liver disease  - Management of cholesterol is also very important.    - The use of statins are an effective means of therapy and are not contra-indicated in those with abnormal liver tests OR those with cirrhosis.  The value of these medications in this population far outweigh the minor  risks of abnormal liver tests.   - Goal LDL in those with MASH are < 100 mg/dL  - Consider the utility of liberalizing coffee consumption as some data that this may slow progression and reverse effects of BONILLA-related fibrosis.  - We recommend a referral to the [facility]   - referral placed    Liver Transplant Candidacy:   - Was evaluated in 2023 and declined due to clinical improvement  - Will re-consider as necessary    Alcohol Use Disorder  - Severe, in sustained remission  - Encouraged to re-engage with recovery program and mental health specialist    Hepatocellular Cancer Screening:   - Due for Abd US today  - Recommend screening for HCC every 6 months with either abdominal ultrasound or by alternating abdominal ultrasound with EITHER a triple/quad phase CT Liver with IV contrast OR a Quad phase MRI Liver with IV contrast.  AFP levels should be checked every 6 months at time of imaging screen.    Ascites/Volume Overload:  - Continue on spironolactone 100mg daily and Lasix 20 mg daily    -Noted mild RYLEY on today's labs; will plan to repeat labs in 1 to 2 months  - Continue to follow a sodium restricted (<2g sodium diet)     Hepatic Encephalopathy:  -Continue on miralax with goal 1-2 BMs daily    Esophageal Varices:   - Last EGD 1/2021; will repeat in now; ordered    -If evidence of medium/large esophageal varices, we recommend esophageal varix band ligation, and  if band ligation is performed, please continue to repeat every 4-8 weeks until eraditation of varices.  - If band ligation is deferred, we strongly recommend the initiation of a non-selective beta-blocker carvedilol    Kidney Health:  -He has RYLEY on today's labs.  May be related to weight gain since last lab check  -We will plan to repeat in approximately 3 months    Immobility/Frailty:   - We discussed the imperative need to remain physically active and to participate in active exercise to maintain  muscle mass and mobility    Nutrition:  As with most patients with chronic liver disease, there is a significant degree of protein malnutrition.    - Dicussed need to change dietary habits to improve overall protein balance.  Discussed the importance of eating between 1.2-1.5g/kg/day lean protein like eggs, fish, poultry, nuts, legumes, and dairy - in addition to a diet rich in fresh fruits and vegetables.    - Continue to follow a sodium restricted (<2g sodium diet) and discussed the need to minimize the intake of carbohydrates and sugars, to avoid obesity.   - Strongly encourage protein supplements 2-3 times daily (Boost, Ensure, [place] Instant Milk, whey protein powder) to meet protein and caloric intake.  - Discussed important need to have a bedtime snack with protein to minimize risk of muscle breakdown    Routine Health Care in Patient with Chronic Liver Disease:  - We recommend screening for hepatitis A and B, please vaccinate if not immune  - All patients with liver disease, particularly those with cholestatic liver disease, are at an increased risk for osteoporosis.  We strongly recommend screening for Vitamin D deficiency at least twice yearly with aggressive supplementation/replacement as indicated.    - We also recommend a screening DEXA scan to evaluate for osteoporosis.  If present, should treat with calcium, Vitamin D supplementation, and recommend consideration of bisphosphonate therapy.  Also recommend follow up DEXA scans to evaluate for improvement of bone density on therapy.  - All patients with liver disease should avoid the use of Non-steroidal Anti-Inflammatory (NSAID) medications as they can cause significant injury to the kidneys in this population    Follow Up:  6 months     Thank you very much for the opportunity to participate in the care of this patient.  If you have any further questions, please don't hesitate to contact our office.    Thomas M. Leventhal, M.D.    of Medicine  Advanced & Transplant Hepatology  [facility]    I spent 40 minutes on the date of the encounter doing chart review, history and exam, documentation and further activities as noted above.      The longitudinal plan of care for cirrhosis (and possible complications) was addressed during this visit. Due to the added complexity in care, I will continue to support this patient in the subsequent management of this condition(s) and with the ongoing continuity of care of this condition

## 2024-12-05 NOTE — PATIENT INSTRUCTIONS
- We will place a referral to the weight management clinic    - Labs again in 3 months    - Please try hard to minimize salt intake    - We will schedule the endoscopy at [facility]    We recommend you go to the website: [email or web address] to review valuable information about your liver disease, good dietary choices, exercise options, and symptom management strategies    Nutrition  - For patients with cirrhosis, it is very important to eat the right types and amounts of foods.  We recommend a diet low in carbohydrates/sugars and high in fresh fruits/vegetables, with the right amount of protein.  We typically recommend 1.5gm/kg/day of protein, or  grams of protein every day.  - In regard to protein intake, you can focus on: All meats, cooked fish and seafood, vegetable-based protein meat products, tofu, eggs, legumes, dairy products (including milk and yogurt and cheese), unsalted nuts, etc...  - You should eat at least three meals a day and three to four snacks between meals  - Bedtime snacks are especially important (preferrably something with some protein)    - Patients with malnutrition and/or loss of muscular mass can improve their nutrition and muscular mass by drinking two cans of dietary supplements daily, particularly at bedtime.  These would include: Ensure, Boost, [place] Instant Milk, Glucerna, or powdered whey protein (or similar supplements)  - Please avoid eating raw seafood, especially shellfish, because of risk of serious illness  - We recommend all patients with cirrhosis limit their daily sodium intake to less than 2,000mg      General Liver Health  - Avoid the use of all non-steroid anti-inflammatory medicines (ibuprofen, Aleve, naproxen, aspirin, etc.) as this can cause serious injury to your kidneys in the setting of liver disease  - If you see a doctor and they prescribe you a new medication, please contact our clinic to let us know what changes are  being made  - If you become acutely ill and present to an ER or are admitted to a hospital, please let us know as soon as you are able.  - Patients with chronic liver disease be vaccinated against hepatitis A and B.  Please discuss with your primary provider the need for these vaccines  - As patients with liver disease are at an increased risk of developing osteoporosis, we recommend that you have a DEXA scan with appropriate follow up and treatment.   - We recommend screening for Vitamin D deficiency (at least yearly) and aggressive replacement/supplementation as warranted.    Sleep Troubles:  - Sleep issues are very common in patients with chronic liver disease  - Recommend strict avoidance of medications such as narcotics, sedatives and sleep aids.    - Low dose benadryl or melatonin can be used for insomnia, if needed.      Steatotic Liver Disease (SLD, formerly referred to as Non-alcoholic Fatty Liver Disease)  - I had a long discussion with the patient about metabolic dysfunction-associated steatotic liver disease (MASLD).  We discussed how fat deposits in the liver, how this leads to inflammation, and how chronic inflammation (metabolic dysfunction-associated steatohepatitis/MASH) can ultimately lead to scarring and cirrhosis.  The long-term complications of fatty liver disease were discussed with the patient, including the increased risk of cardiovascular disease complications,the risk of developing diabetes (if not already), and variable progression to cirrhosis and end stage liver disease.    Management of MASLD/MASH  - We spent time discussing necessary lifestyle modifications including: appropriate diet, exercise and weight loss plan.      - Recommend exercise regularly: at least 4 times per week, with a minimum of 40-45 minutes per day.      - It has shown that patients who exercise regularly can have improvement of insulin resistance, increase in baseline metabolic activity and resolution of fatty  liver disease (even if  appreciable weight loss does not occur)    - Recommend a low-carbohydrate, low-calorie diet (~1700 calories per day).    - An ideal weight loss plan would be to lose 7-10% of body weight over the next six months.  This has been proven to resolve fat and inflammation in the liver, and can lead to regression of scarring that might be present  - If the patient has diabetes, we recommend assertive management: ideal goal Hgb A1c goal of =/< 7%.     - There are no overt contraindications to medications used in the treatment of diabetes in persons with chronic liver disease  - Management of cholesterol is also very important.    - The use of statins are an effective means of therapy and are not contra-indicated in those with abnormal liver tests OR those with cirrhosis.  The value of these medications in this population far outweigh the minor risks of abnormal liver tests.   - Goal LDL in those with MASH are < 100 mg/dL  - Consider the utility of liberalizing coffee consumption as some data that this may slow progression and reverse effects of BONILLA-related fibrosis.  - We recommend a referral to the [facility]   - this will be placed

## 2025-01-18 ENCOUNTER — HEALTH MAINTENANCE LETTER (OUTPATIENT)
Age: 65
End: 2025-01-18

## 2025-03-15 ENCOUNTER — HEALTH MAINTENANCE LETTER (OUTPATIENT)
Age: 65
End: 2025-03-15

## 2025-05-20 DIAGNOSIS — K70.31 ALCOHOLIC CIRRHOSIS OF LIVER WITH ASCITES (H): Primary | ICD-10-CM

## 2025-06-04 ENCOUNTER — LAB (OUTPATIENT)
Dept: LAB | Facility: CLINIC | Age: 65
End: 2025-06-04
Attending: PHYSICIAN ASSISTANT
Payer: COMMERCIAL

## 2025-06-04 ENCOUNTER — ANCILLARY PROCEDURE (OUTPATIENT)
Dept: ULTRASOUND IMAGING | Facility: CLINIC | Age: 65
End: 2025-06-04
Attending: INTERNAL MEDICINE
Payer: COMMERCIAL

## 2025-06-04 ENCOUNTER — OFFICE VISIT (OUTPATIENT)
Dept: GASTROENTEROLOGY | Facility: CLINIC | Age: 65
End: 2025-06-04
Attending: PHYSICIAN ASSISTANT
Payer: COMMERCIAL

## 2025-06-04 VITALS
HEART RATE: 74 BPM | TEMPERATURE: 98.1 F | SYSTOLIC BLOOD PRESSURE: 123 MMHG | WEIGHT: 241.1 LBS | RESPIRATION RATE: 22 BRPM | OXYGEN SATURATION: 96 % | BODY MASS INDEX: 41.16 KG/M2 | HEIGHT: 64 IN | DIASTOLIC BLOOD PRESSURE: 77 MMHG

## 2025-06-04 DIAGNOSIS — Z78.0 ASYMPTOMATIC MENOPAUSAL STATE: ICD-10-CM

## 2025-06-04 DIAGNOSIS — K70.31 ALCOHOLIC CIRRHOSIS OF LIVER WITH ASCITES (H): ICD-10-CM

## 2025-06-04 DIAGNOSIS — D68.9 COAGULOPATHY: ICD-10-CM

## 2025-06-04 DIAGNOSIS — D69.6 THROMBOCYTOPENIA: ICD-10-CM

## 2025-06-04 DIAGNOSIS — G47.9 SLEEPING DIFFICULTY: ICD-10-CM

## 2025-06-04 DIAGNOSIS — E80.6 HYPERBILIRUBINEMIA: ICD-10-CM

## 2025-06-04 DIAGNOSIS — F10.21 ALCOHOL DEPENDENCE, IN REMISSION (H): ICD-10-CM

## 2025-06-04 DIAGNOSIS — R74.01 ELEVATED SGOT (AST): ICD-10-CM

## 2025-06-04 DIAGNOSIS — K70.31 ALCOHOLIC CIRRHOSIS OF LIVER WITH ASCITES (H): Primary | ICD-10-CM

## 2025-06-04 LAB
AFP SERPL-MCNC: 3.8 NG/ML
ALBUMIN SERPL BCG-MCNC: 3.6 G/DL (ref 3.5–5.2)
ALP SERPL-CCNC: 82 U/L (ref 40–150)
ALT SERPL W P-5'-P-CCNC: 34 U/L (ref 0–50)
ANION GAP SERPL CALCULATED.3IONS-SCNC: 12 MMOL/L (ref 7–15)
AST SERPL W P-5'-P-CCNC: 49 U/L (ref 0–45)
BILIRUB SERPL-MCNC: 1.5 MG/DL
BILIRUBIN DIRECT (ROCHE PRO & PURE): 0.71 MG/DL (ref 0–0.45)
BUN SERPL-MCNC: 17.1 MG/DL (ref 8–23)
CALCIUM SERPL-MCNC: 9.2 MG/DL (ref 8.8–10.4)
CHLORIDE SERPL-SCNC: 105 MMOL/L (ref 98–107)
CREAT SERPL-MCNC: 0.93 MG/DL (ref 0.51–0.95)
EGFRCR SERPLBLD CKD-EPI 2021: 67 ML/MIN/1.73M2
ERYTHROCYTE [DISTWIDTH] IN BLOOD BY AUTOMATED COUNT: 14.1 % (ref 10–15)
GLUCOSE SERPL-MCNC: 113 MG/DL (ref 70–99)
HCO3 SERPL-SCNC: 23 MMOL/L (ref 22–29)
HCT VFR BLD AUTO: 39.7 % (ref 35–47)
HGB BLD-MCNC: 13.7 G/DL (ref 11.7–15.7)
INR PPP: 1.19 (ref 0.85–1.15)
MCH RBC QN AUTO: 33.3 PG (ref 26.5–33)
MCHC RBC AUTO-ENTMCNC: 34.5 G/DL (ref 31.5–36.5)
MCV RBC AUTO: 97 FL (ref 78–100)
PLATELET # BLD AUTO: 144 10E3/UL (ref 150–450)
POTASSIUM SERPL-SCNC: 3.9 MMOL/L (ref 3.4–5.3)
PROT SERPL-MCNC: 7.8 G/DL (ref 6.4–8.3)
PROTHROMBIN TIME: 15.3 SECONDS (ref 11.8–14.8)
RBC # BLD AUTO: 4.11 10E6/UL (ref 3.8–5.2)
SODIUM SERPL-SCNC: 140 MMOL/L (ref 135–145)
WBC # BLD AUTO: 4.6 10E3/UL (ref 4–11)

## 2025-06-04 PROCEDURE — 82248 BILIRUBIN DIRECT: CPT | Performed by: PATHOLOGY

## 2025-06-04 PROCEDURE — 36415 COLL VENOUS BLD VENIPUNCTURE: CPT | Performed by: PATHOLOGY

## 2025-06-04 PROCEDURE — 99000 SPECIMEN HANDLING OFFICE-LAB: CPT | Performed by: PATHOLOGY

## 2025-06-04 PROCEDURE — 76700 US EXAM ABDOM COMPLETE: CPT | Mod: GC | Performed by: STUDENT IN AN ORGANIZED HEALTH CARE EDUCATION/TRAINING PROGRAM

## 2025-06-04 PROCEDURE — G0463 HOSPITAL OUTPT CLINIC VISIT: HCPCS | Performed by: PHYSICIAN ASSISTANT

## 2025-06-04 PROCEDURE — 80053 COMPREHEN METABOLIC PANEL: CPT | Performed by: PATHOLOGY

## 2025-06-04 PROCEDURE — 82105 ALPHA-FETOPROTEIN SERUM: CPT | Performed by: PHYSICIAN ASSISTANT

## 2025-06-04 PROCEDURE — 85610 PROTHROMBIN TIME: CPT | Performed by: PATHOLOGY

## 2025-06-04 PROCEDURE — 85027 COMPLETE CBC AUTOMATED: CPT | Performed by: PATHOLOGY

## 2025-06-04 RX ORDER — MULTIPLE VITAMINS W/ MINERALS TAB 9MG-400MCG
1 TAB ORAL DAILY
Qty: 90 TABLET | Refills: 3 | Status: SHIPPED | OUTPATIENT
Start: 2025-06-04 | End: 2025-06-05

## 2025-06-04 RX ORDER — TRAZODONE HYDROCHLORIDE 50 MG/1
50 TABLET ORAL
Qty: 90 TABLET | Refills: 3 | Status: SHIPPED | OUTPATIENT
Start: 2025-06-04

## 2025-06-04 RX ORDER — FUROSEMIDE 20 MG/1
20 TABLET ORAL DAILY
Qty: 90 TABLET | Refills: 3 | Status: SHIPPED | OUTPATIENT
Start: 2025-06-04

## 2025-06-04 RX ORDER — IBUPROFEN 200 MG
400 TABLET ORAL EVERY 4 HOURS PRN
COMMUNITY
End: 2025-06-04

## 2025-06-04 RX ORDER — SPIRONOLACTONE 100 MG/1
100 TABLET, FILM COATED ORAL DAILY
Qty: 90 TABLET | Refills: 3 | Status: SHIPPED | OUTPATIENT
Start: 2025-06-04

## 2025-06-04 ASSESSMENT — PAIN SCALES - GENERAL: PAINLEVEL_OUTOF10: NO PAIN (0)

## 2025-06-04 NOTE — LETTER
6/4/2025      Marj Slater  [address] [facility] [address]      Dear Colleague,    Thank you for referring your patient, Marj Slater, to the [facility] [place]. Please see a copy of my visit note below.    Hepatology Clinic Note  Marj Slater   Date of Birth 1960    REASON FOR FOLLOW UP: Cirrhosis   REFERRING PROVIDER: Dr. Leventhal         Assessment/plan:     Cirrhosis:    - 2/2 HCV and Alcohol  - Cirrhosis is currently compensated  - sober >3 years  - Treated HCV in 2021; SVR  - MELD 3.0: 11 (previously 12)  - MELD labs every 6 months     - Rediscussed metabolic dysfunction-associated steatotic liver disease   - Recommended the following lifestyle modifications: appropriate diet, exercise and wt loss    - Recommend exercise regularly as tolerated              - It has shown that patients who exercise regularly can have improvement of insulin resistance, increase in baseline metabolic activity and resolution of fatty liver disease (even if  appreciable weight loss does not occur)    - Recommend a low-carbohydrate, low-calorie diet   - An ideal weight loss plan would be to lose 7-10% of body weight over the next six months.  This has been proven to resolve fat and inflammation in the liver, and can lead to regression of scarring that might be present  - If the patient has diabetes, we recommend assertive management: ideal goal Hgb A1c goal of =/< 7%.     - There are no overt contraindications to medications used in the treatment of diabetes in persons with chronic liver disease  - Management of cholesterol is also very important.    - The use of statins are an effective means of therapy and are not contra-indicated in those with abnormal liver tests OR those with cirrhosis.  The value of these medications in this population far outweigh the minor risks of abnormal liver tests.   - Goal LDL in those with MASH are < 100 mg/dL  - Consider utility of liberalizing  coffee consumption as some data that this may slow progression and reverse effects of BONILLA-related fibrosis.  - Recommend a referral to [facility]; Dr. Leventhal previously placed referral December 2024 but this was never scheduled--> provided patient phone number to give them a call back and schedule     Liver Transplant Candidacy:   - Evaluated in 2023 and declined due to clinical improvement  - Re-consider as necessary     Alcohol Use Disorder  - Severe, in sustained remission (sober since October 2021)  - No recent PETH  - Contents AA meetings weekly as of late     Hepatocellular Cancer Screening:   - Pending final read of today's US; AFP in process  - AFP 3.5 12/5/24  - US abd comp 12/5/24: liver demonstrates coarsened hepatic echotexture and nodular  surface contour. Subcapsular left hepatic lobe 0.5 x 0.5 x 0.6 cm hypoechoic lesion. Additional subcapsular versus perihepatic 0.8 x 0.8 x 0.5 cm anechoic avascular lesion.   - Recommend screening for HCC every 6 months     Ascites/Volume Overload:  - Continue spironolactone 100mg daily and Lasix 20 mg daily; refill sent  - No recent need for paracentesis  - Follow sodium restricted diet (<2g sodium per day)      Hepatic Encephalopathy:  - Denies any recent confusion  - Typically passing stool every other day, which is normal for her  - Advised her to continue to monitor for any changes in mentation and that if this occurs, would have to consider starting her on lactulose and/or rifaximin     Esophageal Varices:   - Last EGD 1/2021; remains due for repeat EGD; re-ordered     > If evidence of medium/large esophageal varices, recommend ligation, and if band ligation is performed, continue to repeat every 4-8 wks until eraditation   > If band ligation deferred, recommend initiation of a non-selective beta-blocker      Kidney Health:  - Kidney function normal today on lab work     Immobility/Frailty:   - Remain physically  active and participate in active exercise to maintain muscle mass/mobility     Nutrition:  - Eating between 1.2-1.5g/kg/day lean protein in addition to fresh fruits and vegetables    - Minimize intake of carbs and sugars, to avoid obesity  - Encourage protein supplements 2-3 times daily to meet protein and caloric intake  - Have bedtime snack with protein to minimize risk of muscle breakdown     Routine Health Care in Patient with Chronic Liver Disease:  - Recommend screening for hepatitis A and B, please vaccinate if not immune  - All patients with liver disease, particularly those with cholestatic liver disease, are at an increased risk for osteoporosis. Recommend screening for Vitamin D deficiency at least twice yearly with aggressive supplementation/replacement as indicated.    - Recommend a screening DEXA scan to evaluate for osteoporosis.  If present, should treat with calcium, Vitamin D supplementation, and recommend consideration of bisphosphonate therapy.  Also recommend follow up DEXA scans to evaluate for improvement of bone density on therapy.   > Last DEXA scan was 2022; ordered repeat DEXA scan  - Avoid Non-steroidal Anti-Inflammatory meds (patient agrees to avoid)  - Do not exceed 2000 mg tylenol in 24 hr period      Follow Up:  6 months with labs and US same day    Yany Ariza PA-C  [facility]   -----------------------------------------------------         HPI:   Marj is a 65 YO F presenting for follow-up regarding cirrhosis. Last seen by Dr. Leventhal Dec. 2024.    Patient denies any recent ER visits or hospitalizations.  No recent surgeries or procedures.  No changes to medications.  No changes to family medical history.    States that medications she currently takes include trazodone as needed, spironolactone, multivitamin and furosemide.  She would like refills of all.    No recent fevers, chills, nausea, vomiting or abdominal pain.  No yellowing of the eyes or  skin.  Appetite is good.  Occasionally notes mild swelling in her feet.  No swelling in ankles.  Typically passing stool every other day, which is normal for her.  Denies any recent confusion.  No recent need for paracentesis.  States she recently found out that she might have arthritis in her spine.  She has been dealing with back pain.  Patient states back pain tends to interfere with activity level.  For exercise, she has been doing some walking and goes up and down stairs.  States she has been taking ibuprofen recently for back pain.  Sometimes takes ibuprofen daily.  She avoids Tylenol.    Continues to smoke cigarettes, typically 2-5 cigarettes/day.  States she has been smoking cigarettes chronically.  Denies any illicit drug use.  Has remained sober from alcohol use.  States she will be 4 years sober from alcohol use in October.  Occasionally has cravings for alcohol.  Attends AA meetings once per week.  Her goal is to maintain sobriety.    Procedures:  EGD:   1/26/2021  - portal hypertensive gastropathy  - no varices     Colonoscopy 1/5/2023  Findings:       Hemorrhoids were found on perianal exam.        Two sessile polyps were found in the ascending colon. The polyps were 2        to 3 mm in size. These polyps were removed with a jumbo cold forceps.        Resection and retrieval were complete. To prevent bleeding after the        polypectomy, two hemostatic clips were successfully placed. There was no        bleeding at the end of the procedure.        A 4 mm polyp was found in the descending colon. The polyp was sessile.        The polyp was removed with a cold snare. Resection and retrieval were        complete.        A 2 mm polyp was found in the descending colon. The polyp was sessile.        The polyp was removed with a jumbo cold forceps. Resection and retrieval        were complete.        Inflammed Internal hemorrhoids were found during retroflexion. The        hemorrhoids were large and Grade II  "(internal hemorrhoids that prolapse        but reduce spontaneously).     PMH:    has a past medical history of Chronic hepatitis C (H), History of blood transfusion, and History of pneumonectomy.     SMH:    has a past surgical history that includes Colonoscopy (N/A, 1/5/2023).     Medications:   Current Outpatient Medications   Medication Sig Dispense Refill     furosemide (LASIX) 20 MG tablet Take 1 tablet (20 mg) by mouth daily. 90 tablet 3     multivitamin w/minerals (THERA-VIT-M) tablet Take 1 tablet by mouth daily. 90 tablet 3     spironolactone (ALDACTONE) 100 MG tablet Take 1 tablet (100 mg) by mouth daily. 90 tablet 3     traZODone (DESYREL) 50 MG tablet Take 1 tablet (50 mg) by mouth nightly as needed for sleep. 90 tablet 3     No current facility-administered medications for this visit.     Previous work-up:    Lab Results   Component Value Date    HEPBANG Nonreactive 03/21/2022    HCVAB Positive (A) 03/21/2022    HCVRNA Not Detected 06/15/2022    ANNE 137 12/27/2022    IRONSAT 67 (H) 12/27/2022    TSH 2.40 04/25/2023    CHOL 224 (H) 07/21/2022    HDL 66 07/21/2022     (H) 07/21/2022    TRIG 77 07/21/2022    POPETH <10 10/30/2023    PLPETH <10 10/30/2023      No results found for: \"SPECDES\", \"LDRESULTS\"    Recent Labs   Lab Test 12/05/24  1012 05/02/24  0911 10/30/23  1048 04/25/23  1155 12/27/22  1040 09/07/22  0805 07/21/22  0707 06/15/22  1408 03/28/22  0722 03/27/22  0715   ALKPHOS 85 107 101 144* 141* 132* 181* 146* 116 105   ALT 28 31 29 25 31 27 40 33 70* 73*   AST 36 47* 44 48* 48* 47* 53* 49* 97* 116*   BILITOTAL 1.4* 1.8* 2.2* 2.7* 2.6* 3.5* 3.2* 4.2* 6.5* 7.9*          Allergies:   No Known Allergies         Social History:     Social History     Socioeconomic History     Marital status: Single     Spouse name: Not on file     Number of children: Not on file     Years of education: Not on file     Highest education level: Not on file   Occupational History     Not on file   Tobacco Use " "    Smoking status: Some Days     Current packs/day: 0.25     Types: Cigarettes     Smokeless tobacco: Never     Tobacco comments:     2 cigs a day. Smoke a 1-1.5 pack a week.    Vaping Use     Vaping status: Never Used   Substance and Sexual Activity     Alcohol use: Not Currently     Comment: Last ETOH use was 10/21     Drug use: Never     Sexual activity: Not on file   Other Topics Concern     Not on file   Social History Narrative     Not on file     Social Drivers of Health     Financial Resource Strain: Not on file   Food Insecurity: Not on file   Transportation Needs: Not on file   Physical Activity: Not on file   Stress: Not on file   Social Connections: Not on file   Interpersonal Safety: Not on file   Housing Stability: Not on file          Family History:     Family History   Problem Relation Age of Onset     Breast Cancer Mother      Coronary Artery Disease Father           Review of Systems:   Gen: See HPI          Physical Exam:   Vital signs:  Temp: 98.1  F (36.7  C) Temp src: Oral BP: 123/77 Pulse: 74   Resp: 22 SpO2: 96 %     Height: 162.6 cm (5' 4.02\") Weight: 109.4 kg (241 lb 1.6 oz)  Estimated body mass index is 41.36 kg/m  as calculated from the following:    Height as of this encounter: 1.626 m (5' 4.02\").    Weight as of this encounter: 109.4 kg (241 lb 1.6 oz).  Gen: A&Ox3, NAD  HEENT: Sclera anicteric  Lung: Nonlabored breathing  Ext: no edema   Skin: No jaundice  Neuro: grossly intact, no asterixis   Psych: appropriate mood and affects         Data:   Reviewed in person and significant for:    Lab Results   Component Value Date     12/05/2024      Lab Results   Component Value Date    POTASSIUM 4.3 12/05/2024    POTASSIUM 4.0 07/21/2022     Lab Results   Component Value Date    CHLORIDE 105 12/05/2024    CHLORIDE 103 07/21/2022     Lab Results   Component Value Date    CO2 25 12/05/2024    CO2 26 07/21/2022     Lab Results   Component Value Date    BUN 19.9 12/05/2024    BUN 19 " 07/21/2022     Lab Results   Component Value Date    CR 1.07 12/05/2024       Lab Results   Component Value Date    WBC 3.6 12/05/2024     Lab Results   Component Value Date    HGB 12.4 12/05/2024     Lab Results   Component Value Date    HCT 36.0 12/05/2024     Lab Results   Component Value Date    MCV 94 12/05/2024     Lab Results   Component Value Date     12/05/2024       Lab Results   Component Value Date    AST 36 12/05/2024     Lab Results   Component Value Date    ALT 28 12/05/2024     Lab Results   Component Value Date    BILITOTAL 1.4 12/05/2024     Lab Results   Component Value Date    ALBUMIN 3.5 12/05/2024    ALBUMIN 2.6 07/21/2022     Lab Results   Component Value Date    PROTTOTAL 7.7 12/05/2024      Lab Results   Component Value Date    ALKPHOS 85 12/05/2024     Lab Results   Component Value Date    INR 1.27 12/05/2024    INR 1.6 04/12/2022       Imaging:       US ABDOMEN COMPLETE, 12/5/2024 1:13 PM     Indication: Patient at high risk for HCC. Alcoholic cirrhosis of liver  with ascites (H)     Comparison: Ultrasound 10/30/2023. CT 3/15/2022.     Technique: The abdomen was scanned in the standard fashion with  specialized ultrasound transducer(s) using both gray scale and limited  color/spectral Doppler techniques.     Findings:     Liver:     The liver demonstrates coarsened hepatic echotexture and nodular  surface contour. Subcapsular left hepatic lobe 0.5 x 0.5 x 0.6 cm  hypoechoic lesion. Additional subcapsular versus perihepatic 0.8 x 0.8  x 0.5 cm anechoic avascular lesion. No intrahepatic biliary ductal  dilatation. The main portal vein is patent with antegrade flow  measuring 0.8 cm in diameter.     US visualization score: A - No or minimal limitations     Gallbladder: The gallbladder is well distended and of normal  morphology. Cholelithiasis. There is no wall thickening,  pericholecystic fluid, sonographic Love's sign.     Bile Ducts: Both the intra- and extrahepatic biliary system  are of  normal caliber. The common bile duct measures 2 mm in diameter.     Pancreas: Visualized portions of the head and body of the pancreas are  unremarkable.      Kidneys: Both kidneys are of normal echotexture, without suspicious  mass nor hydronephrosis.  Left 1.1 x 0.9 x 1.0 cm anechoic simple  cortical cysts, not significantly change from prior. Questionable  avascular hyperechogenic focus within the superior left kidney. The  craniocaudal dimensions are: right- 9.9 cm, left- 10.1 cm.     Spleen: The spleen is mildly enlarged, measuring 14.3 cm in sagittal  dimension.     Aorta and IVC: The visualized portions of the aorta and IVC are  unremarkable.      Fluid: No evidence of ascites or pleural effusions.                                                                      Impression:  1. Cirrhosis with indeterminate hypoechoic subcapsular left hepatic  lobe lesion measuring up to 0.6 cm. Additional small 0.8 cm left  subcapsular anechoic lesion which may represent a simple cyst versus a  focus of perihepatic ascites.  a. LI-RADS US Category: US-2 Subthreshold: Observation(s) detected  that may warrant short-term US surveillance  b. Recommend continued surveillance US.  2. Cholelithiasis.  3. Stable mild splenomegaly.  4. Questionable hyperechogenic focus within the left kidney.  Differential includes a small renal stone, renal angiomyolipoma, or  artifact. This can be reassessed on follow-up.     *Recommendations above based on LI-RADS? v2017:  https://www.acr.org/Clinical-Resources/Reporting-and-Data-Systems/LI-R  DS/Ultrasound-LI-RADS-[email or web address]     I have personally reviewed the examination and initial interpretation  and I agree with the findings.     AMOS LINDQUIST MD        Again, thank you for allowing me to participate in the care of your patient.        Sincerely,        Yany Ariza PA-C    Electronically signed

## 2025-06-04 NOTE — PROGRESS NOTES
Hepatology Clinic Note  Marj Slater   Date of Birth 1960    REASON FOR FOLLOW UP: Cirrhosis   REFERRING PROVIDER: Dr. Leventhal         Assessment/plan:     Cirrhosis:    - 2/2 HCV and Alcohol  - Cirrhosis is currently compensated  - sober >3 years  - Treated HCV in 2021; SVR  - MELD 3.0: 11 (previously 12)  - MELD labs every 6 months     - Rediscussed metabolic dysfunction-associated steatotic liver disease   - Recommended the following lifestyle modifications: appropriate diet, exercise and wt loss    - Recommend exercise regularly as tolerated              - It has shown that patients who exercise regularly can have improvement of insulin resistance, increase in baseline metabolic activity and resolution of fatty liver disease (even if  appreciable weight loss does not occur)    - Recommend a low-carbohydrate, low-calorie diet   - An ideal weight loss plan would be to lose 7-10% of body weight over the next six months.  This has been proven to resolve fat and inflammation in the liver, and can lead to regression of scarring that might be present  - If the patient has diabetes, we recommend assertive management: ideal goal Hgb A1c goal of =/< 7%.     - There are no overt contraindications to medications used in the treatment of diabetes in persons with chronic liver disease  - Management of cholesterol is also very important.    - The use of statins are an effective means of therapy and are not contra-indicated in those with abnormal liver tests OR those with cirrhosis.  The value of these medications in this population far outweigh the minor risks of abnormal liver tests.   - Goal LDL in those with MASH are < 100 mg/dL  - Consider utility of liberalizing coffee consumption as some data that this may slow progression and reverse effects of BONILLA-related fibrosis.  - Recommend a referral to [facility]; Dr. Leventhal previously placed referral December 2024 but  this was never scheduled--> provided patient phone number to give them a call back and schedule     Liver Transplant Candidacy:   - Evaluated in 2023 and declined due to clinical improvement  - Re-consider as necessary     Alcohol Use Disorder  - Severe, in sustained remission (sober since October 2021)  - No recent PETH  - Contents AA meetings weekly as of late     Hepatocellular Cancer Screening:   - Pending final read of today's US; AFP in process  - AFP 3.5 12/5/24  - US abd comp 12/5/24: liver demonstrates coarsened hepatic echotexture and nodular  surface contour. Subcapsular left hepatic lobe 0.5 x 0.5 x 0.6 cm hypoechoic lesion. Additional subcapsular versus perihepatic 0.8 x 0.8 x 0.5 cm anechoic avascular lesion.   - Recommend screening for HCC every 6 months     Ascites/Volume Overload:  - Continue spironolactone 100mg daily and Lasix 20 mg daily; refill sent  - No recent need for paracentesis  - Follow sodium restricted diet (<2g sodium per day)      Hepatic Encephalopathy:  - Denies any recent confusion  - Typically passing stool every other day, which is normal for her  - Advised her to continue to monitor for any changes in mentation and that if this occurs, would have to consider starting her on lactulose and/or rifaximin     Esophageal Varices:   - Last EGD 1/2021; remains due for repeat EGD; re-ordered     > If evidence of medium/large esophageal varices, recommend ligation, and if band ligation is performed, continue to repeat every 4-8 wks until eraditation   > If band ligation deferred, recommend initiation of a non-selective beta-blocker      Kidney Health:  - Kidney function normal today on lab work     Immobility/Frailty:   - Remain physically active and participate in active exercise to maintain muscle mass/mobility     Nutrition:  - Eating between 1.2-1.5g/kg/day lean protein in addition to fresh fruits and vegetables    - Minimize intake of carbs and sugars, to avoid obesity  - Encourage  protein supplements 2-3 times daily to meet protein and caloric intake  - Have bedtime snack with protein to minimize risk of muscle breakdown     Routine Health Care in Patient with Chronic Liver Disease:  - Recommend screening for hepatitis A and B, please vaccinate if not immune  - All patients with liver disease, particularly those with cholestatic liver disease, are at an increased risk for osteoporosis. Recommend screening for Vitamin D deficiency at least twice yearly with aggressive supplementation/replacement as indicated.    - Recommend a screening DEXA scan to evaluate for osteoporosis.  If present, should treat with calcium, Vitamin D supplementation, and recommend consideration of bisphosphonate therapy.  Also recommend follow up DEXA scans to evaluate for improvement of bone density on therapy.   > Last DEXA scan was 2022; ordered repeat DEXA scan  - Avoid Non-steroidal Anti-Inflammatory meds (patient agrees to avoid)  - Do not exceed 2000 mg tylenol in 24 hr period      Follow Up:  6 months with labs and US same day    Yany Ariza PA-C  [facility]   -----------------------------------------------------         HPI:   Marj is a 63 YO F presenting for follow-up regarding cirrhosis. Last seen by Dr. Leventhal Dec. 2024.    Patient denies any recent ER visits or hospitalizations.  No recent surgeries or procedures.  No changes to medications.  No changes to family medical history.    States that medications she currently takes include trazodone as needed, spironolactone, multivitamin and furosemide.  She would like refills of all.    No recent fevers, chills, nausea, vomiting or abdominal pain.  No yellowing of the eyes or skin.  Appetite is good.  Occasionally notes mild swelling in her feet.  No swelling in ankles.  Typically passing stool every other day, which is normal for her.  Denies any recent confusion.  No recent need for paracentesis.  States she recently  found out that she might have arthritis in her spine.  She has been dealing with back pain.  Patient states back pain tends to interfere with activity level.  For exercise, she has been doing some walking and goes up and down stairs.  States she has been taking ibuprofen recently for back pain.  Sometimes takes ibuprofen daily.  She avoids Tylenol.    Continues to smoke cigarettes, typically 2-5 cigarettes/day.  States she has been smoking cigarettes chronically.  Denies any illicit drug use.  Has remained sober from alcohol use.  States she will be 4 years sober from alcohol use in October.  Occasionally has cravings for alcohol.  Attends AA meetings once per week.  Her goal is to maintain sobriety.    Procedures:  EGD:   1/26/2021  - portal hypertensive gastropathy  - no varices     Colonoscopy 1/5/2023  Findings:       Hemorrhoids were found on perianal exam.        Two sessile polyps were found in the ascending colon. The polyps were 2        to 3 mm in size. These polyps were removed with a jumbo cold forceps.        Resection and retrieval were complete. To prevent bleeding after the        polypectomy, two hemostatic clips were successfully placed. There was no        bleeding at the end of the procedure.        A 4 mm polyp was found in the descending colon. The polyp was sessile.        The polyp was removed with a cold snare. Resection and retrieval were        complete.        A 2 mm polyp was found in the descending colon. The polyp was sessile.        The polyp was removed with a jumbo cold forceps. Resection and retrieval        were complete.        Inflammed Internal hemorrhoids were found during retroflexion. The        hemorrhoids were large and Grade II (internal hemorrhoids that prolapse        but reduce spontaneously).     PMH:    has a past medical history of Chronic hepatitis C (H), History of blood transfusion, and History of pneumonectomy.     SMH:    has a past surgical history that  "includes Colonoscopy (N/A, 1/5/2023).     Medications:   Current Outpatient Medications   Medication Sig Dispense Refill    furosemide (LASIX) 20 MG tablet Take 1 tablet (20 mg) by mouth daily. 90 tablet 3    multivitamin w/minerals (THERA-VIT-M) tablet Take 1 tablet by mouth daily. 90 tablet 3    spironolactone (ALDACTONE) 100 MG tablet Take 1 tablet (100 mg) by mouth daily. 90 tablet 3    traZODone (DESYREL) 50 MG tablet Take 1 tablet (50 mg) by mouth nightly as needed for sleep. 90 tablet 3     No current facility-administered medications for this visit.     Previous work-up:    Lab Results   Component Value Date    HEPBANG Nonreactive 03/21/2022    HCVAB Positive (A) 03/21/2022    HCVRNA Not Detected 06/15/2022    ANNE 137 12/27/2022    IRONSAT 67 (H) 12/27/2022    TSH 2.40 04/25/2023    CHOL 224 (H) 07/21/2022    HDL 66 07/21/2022     (H) 07/21/2022    TRIG 77 07/21/2022    POPETH <10 10/30/2023    PLPETH <10 10/30/2023      No results found for: \"SPECDES\", \"LDRESULTS\"    Recent Labs   Lab Test 12/05/24  1012 05/02/24  0911 10/30/23  1048 04/25/23  1155 12/27/22  1040 09/07/22  0805 07/21/22  0707 06/15/22  1408 03/28/22  0722 03/27/22  0715   ALKPHOS 85 107 101 144* 141* 132* 181* 146* 116 105   ALT 28 31 29 25 31 27 40 33 70* 73*   AST 36 47* 44 48* 48* 47* 53* 49* 97* 116*   BILITOTAL 1.4* 1.8* 2.2* 2.7* 2.6* 3.5* 3.2* 4.2* 6.5* 7.9*          Allergies:   No Known Allergies         Social History:     Social History     Socioeconomic History    Marital status: Single     Spouse name: Not on file    Number of children: Not on file    Years of education: Not on file    Highest education level: Not on file   Occupational History    Not on file   Tobacco Use    Smoking status: Some Days     Current packs/day: 0.25     Types: Cigarettes    Smokeless tobacco: Never    Tobacco comments:     2 cigs a day. Smoke a 1-1.5 pack a week.    Vaping Use    Vaping status: Never Used   Substance and Sexual Activity    " "Alcohol use: Not Currently     Comment: Last ETOH use was 10/21    Drug use: Never    Sexual activity: Not on file   Other Topics Concern    Not on file   Social History Narrative    Not on file     Social Drivers of Health     Financial Resource Strain: Not on file   Food Insecurity: Not on file   Transportation Needs: Not on file   Physical Activity: Not on file   Stress: Not on file   Social Connections: Not on file   Interpersonal Safety: Not on file   Housing Stability: Not on file          Family History:     Family History   Problem Relation Age of Onset    Breast Cancer Mother     Coronary Artery Disease Father           Review of Systems:   Gen: See HPI          Physical Exam:   Vital signs:  Temp: 98.1  F (36.7  C) Temp src: Oral BP: 123/77 Pulse: 74   Resp: 22 SpO2: 96 %     Height: 162.6 cm (5' 4.02\") Weight: 109.4 kg (241 lb 1.6 oz)  Estimated body mass index is 41.36 kg/m  as calculated from the following:    Height as of this encounter: 1.626 m (5' 4.02\").    Weight as of this encounter: 109.4 kg (241 lb 1.6 oz).  Gen: A&Ox3, NAD  HEENT: Sclera anicteric  Lung: Nonlabored breathing  Ext: no edema   Skin: No jaundice  Neuro: grossly intact, no asterixis   Psych: appropriate mood and affects         Data:   Reviewed in person and significant for:    Lab Results   Component Value Date     12/05/2024      Lab Results   Component Value Date    POTASSIUM 4.3 12/05/2024    POTASSIUM 4.0 07/21/2022     Lab Results   Component Value Date    CHLORIDE 105 12/05/2024    CHLORIDE 103 07/21/2022     Lab Results   Component Value Date    CO2 25 12/05/2024    CO2 26 07/21/2022     Lab Results   Component Value Date    BUN 19.9 12/05/2024    BUN 19 07/21/2022     Lab Results   Component Value Date    CR 1.07 12/05/2024       Lab Results   Component Value Date    WBC 3.6 12/05/2024     Lab Results   Component Value Date    HGB 12.4 12/05/2024     Lab Results   Component Value Date    HCT 36.0 12/05/2024     Lab " Results   Component Value Date    MCV 94 12/05/2024     Lab Results   Component Value Date     12/05/2024       Lab Results   Component Value Date    AST 36 12/05/2024     Lab Results   Component Value Date    ALT 28 12/05/2024     Lab Results   Component Value Date    BILITOTAL 1.4 12/05/2024     Lab Results   Component Value Date    ALBUMIN 3.5 12/05/2024    ALBUMIN 2.6 07/21/2022     Lab Results   Component Value Date    PROTTOTAL 7.7 12/05/2024      Lab Results   Component Value Date    ALKPHOS 85 12/05/2024     Lab Results   Component Value Date    INR 1.27 12/05/2024    INR 1.6 04/12/2022       Imaging:       US ABDOMEN COMPLETE, 12/5/2024 1:13 PM     Indication: Patient at high risk for HCC. Alcoholic cirrhosis of liver  with ascites (H)     Comparison: Ultrasound 10/30/2023. CT 3/15/2022.     Technique: The abdomen was scanned in the standard fashion with  specialized ultrasound transducer(s) using both gray scale and limited  color/spectral Doppler techniques.     Findings:     Liver:     The liver demonstrates coarsened hepatic echotexture and nodular  surface contour. Subcapsular left hepatic lobe 0.5 x 0.5 x 0.6 cm  hypoechoic lesion. Additional subcapsular versus perihepatic 0.8 x 0.8  x 0.5 cm anechoic avascular lesion. No intrahepatic biliary ductal  dilatation. The main portal vein is patent with antegrade flow  measuring 0.8 cm in diameter.     US visualization score: A - No or minimal limitations     Gallbladder: The gallbladder is well distended and of normal  morphology. Cholelithiasis. There is no wall thickening,  pericholecystic fluid, sonographic Love's sign.     Bile Ducts: Both the intra- and extrahepatic biliary system are of  normal caliber. The common bile duct measures 2 mm in diameter.     Pancreas: Visualized portions of the head and body of the pancreas are  unremarkable.      Kidneys: Both kidneys are of normal echotexture, without suspicious  mass nor hydronephrosis.  Left  1.1 x 0.9 x 1.0 cm anechoic simple  cortical cysts, not significantly change from prior. Questionable  avascular hyperechogenic focus within the superior left kidney. The  craniocaudal dimensions are: right- 9.9 cm, left- 10.1 cm.     Spleen: The spleen is mildly enlarged, measuring 14.3 cm in sagittal  dimension.     Aorta and IVC: The visualized portions of the aorta and IVC are  unremarkable.      Fluid: No evidence of ascites or pleural effusions.                                                                      Impression:  1. Cirrhosis with indeterminate hypoechoic subcapsular left hepatic  lobe lesion measuring up to 0.6 cm. Additional small 0.8 cm left  subcapsular anechoic lesion which may represent a simple cyst versus a  focus of perihepatic ascites.  a. LI-RADS US Category: US-2 Subthreshold: Observation(s) detected  that may warrant short-term US surveillance  b. Recommend continued surveillance US.  2. Cholelithiasis.  3. Stable mild splenomegaly.  4. Questionable hyperechogenic focus within the left kidney.  Differential includes a small renal stone, renal angiomyolipoma, or  artifact. This can be reassessed on follow-up.     *Recommendations above based on LI-RADS? v2017:  https://www.acr.org/Clinical-Resources/Reporting-and-Data-Systems/LI-R  DS/Ultrasound-LI-RADS-v2017     I have personally reviewed the examination and initial interpretation  and I agree with the findings.     AMOS LIDNQUIST MD

## 2025-06-04 NOTE — NURSING NOTE
"Chief Complaint   Patient presents with    RECHECK     Cirrhosis     Vital signs:  Temp: 98.1  F (36.7  C) Temp src: Oral BP: 123/77 Pulse: 74   Resp: 22 SpO2: 96 %     Height: 162.6 cm (5' 4.02\") Weight: 109.4 kg (241 lb 1.6 oz)  Estimated body mass index is 41.36 kg/m  as calculated from the following:    Height as of this encounter: 1.626 m (5' 4.02\").    Weight as of this encounter: 109.4 kg (241 lb 1.6 oz).      Enedelia Chino, [facility]  6/4/2025 10:28 AM    "

## 2025-06-05 RX ORDER — MULTIPLE VITAMINS W/ MINERALS TAB 9MG-400MCG
1 TAB ORAL DAILY
Qty: 90 TABLET | Refills: 3 | Status: SHIPPED | OUTPATIENT
Start: 2025-06-05

## (undated) DEVICE — ENDO FORCEP SPIKED SERRATED SHAFT JUMBO 239CM G56998

## (undated) DEVICE — ASSURANCE CLIP

## (undated) DEVICE — ENDO SNARE EXACTO COLD 9MM LOOP 2.4MMX230CM 00711115

## (undated) DEVICE — GOWN IMPERVIOUS 2XL BLUE

## (undated) DEVICE — KIT ENDO TURNOVER/PROCEDURE CARRY-ON 101822

## (undated) DEVICE — SOL WATER IRRIG 500ML BOTTLE 2F7113

## (undated) DEVICE — SUCTION MANIFOLD NEPTUNE 2 SYS 1 PORT 702-025-000

## (undated) DEVICE — ENDO TRAP POLYP E-TRAP 00711099

## (undated) DEVICE — ENDO FORCEP BX CAPTURA PRO SPIKE G50696

## (undated) DEVICE — TUBING SUCTION 12"X1/4" N612

## (undated) DEVICE — SNARE CAPIVATOR ROUND COLD SNR BX10 M00561101

## (undated) DEVICE — SPECIMEN CONTAINER 3OZ W/FORMALIN 59901

## (undated) RX ORDER — IVABRADINE 5 MG/1
TABLET, FILM COATED ORAL
Status: DISPENSED
Start: 2022-07-28

## (undated) RX ORDER — METOPROLOL TARTRATE 50 MG
TABLET ORAL
Status: DISPENSED
Start: 2022-07-28